# Patient Record
Sex: FEMALE | Race: BLACK OR AFRICAN AMERICAN | Employment: FULL TIME | URBAN - METROPOLITAN AREA
[De-identification: names, ages, dates, MRNs, and addresses within clinical notes are randomized per-mention and may not be internally consistent; named-entity substitution may affect disease eponyms.]

---

## 2018-03-22 ENCOUNTER — OFFICE VISIT (OUTPATIENT)
Dept: NEUROLOGY | Age: 48
End: 2018-03-22

## 2018-03-22 VITALS
RESPIRATION RATE: 18 BRPM | SYSTOLIC BLOOD PRESSURE: 122 MMHG | DIASTOLIC BLOOD PRESSURE: 80 MMHG | WEIGHT: 164.6 LBS | HEIGHT: 65 IN | HEART RATE: 78 BPM | TEMPERATURE: 97.6 F | OXYGEN SATURATION: 99 % | BODY MASS INDEX: 27.42 KG/M2

## 2018-03-22 DIAGNOSIS — G43.909 MIGRAINE WITHOUT STATUS MIGRAINOSUS, NOT INTRACTABLE, UNSPECIFIED MIGRAINE TYPE: Primary | ICD-10-CM

## 2018-03-22 RX ORDER — PROMETHAZINE HYDROCHLORIDE 25 MG/1
25 TABLET ORAL
Qty: 30 TAB | Refills: 0 | Status: SHIPPED | OUTPATIENT
Start: 2018-03-22 | End: 2018-11-02 | Stop reason: CLARIF

## 2018-03-22 RX ORDER — ERGOCALCIFEROL 1.25 MG/1
CAPSULE ORAL
Refills: 0 | COMMUNITY
Start: 2018-03-14 | End: 2018-09-26 | Stop reason: ALTCHOICE

## 2018-03-22 RX ORDER — SUMATRIPTAN 100 MG/1
TABLET, FILM COATED ORAL
Qty: 20 TAB | Refills: 2 | Status: SHIPPED | OUTPATIENT
Start: 2018-03-22

## 2018-03-22 RX ORDER — SUMATRIPTAN 50 MG/1
TABLET, FILM COATED ORAL AS NEEDED
Refills: 3 | COMMUNITY
Start: 2018-03-13 | End: 2018-03-22 | Stop reason: SDUPTHER

## 2018-03-22 RX ORDER — CYCLOBENZAPRINE HCL 10 MG
TABLET ORAL
Refills: 3 | COMMUNITY
Start: 2018-03-13 | End: 2019-10-25 | Stop reason: ALTCHOICE

## 2018-03-22 NOTE — PROGRESS NOTES
NEUROLOGY NEW PATIENT CONSULTATION    REFERRED BY:  Pascual Hernandez MD    CHIEF COMPLAINT: migraines       HISTORY OF PRESENT ILLNESS    HISTORY PROVIDED BY:  Patient    Karrie Habermann is a 57 y.o. female who I am asked to see in consultation for today for migraines She was diagnosed with migraines at the age of 27. The patient started treatment at the age of 27. 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 During this time of her life, she was under stress 2 a week. Did have some improvement. Stress  Trigger. Past few years her headaches have improved. She notes that she is having about 2 migraines a month. She notes that she is going to the  [facility] every 3 months. She will lose vision in her  Eyes like a window blind is closing. Starting menstrual cycle and weather. . In the last month, the patient was a headache free for 28 days. The patient is sensitive to light, sound. Patient experiences nausea and migraines worsen with movement. Triggering factors are anxiety/worry, change in the weather, food and menstrual cycle. The patient describes the pain as throbbing, stabbing, sharp. The pain is located on the top of the head on the left and right sides. The patient stated her mother and maternal aunt had headaches in the past. Patient denies any history of head and neck injury. The patient stated darkness, quiet, rest makes the headaches better. The patient is currently taking  Excedrin a migraine, Fioricet, and Phenergan as needed.     Headache Characterization: throbbing and stabbing  Pain Level:10 /10  Aura:  Yes ,   Frequency/Length: 2 a month last for 2-8hours    Location: top right or left side of the head.    Nausea/Vomiting: yes  Photophobia: yes  Phonophobia: yes  Provoking factors:  Stress, food,   Relieving factors:  Medications, dark room sleep    Focal neurologic symptoms with headache: numbness and tingling in the vida     Meds:  Prior abortive tx:naproxen  Prior preventative tx:amytripline (3 years)     Current abortive tx:phergran and Ferciot, Excedrin migraines   Current preventative tx:none    Family Hx of headaches/migraines: Mother and aunt     Social:  Work:yes,  Housekeeping  Caffeine:yes 1 cup a day  29oz  Tobacco use: no  Sleep habits:6-8 hours a sleep   Exercise:yes         PMH  Past Medical History:   Diagnosis Date    Frequent headaches     Migraine     22 years       SH  Social History     Social History    Marital status: SINGLE     Spouse name: N/A    Number of children: N/A    Years of education: N/A     Social History Main Topics    Smoking status: Never Smoker    Smokeless tobacco: Never Used    Alcohol use Yes      Comment: social    Drug use: No    Sexual activity: Not Asked     Other Topics Concern    None     Social History Narrative       FH  Family History   Problem Relation Age of Onset    Headache Mother     Headache Maternal Aunt        ALLERGIES  No Known Allergies    CURRENT MEDS  Current Outpatient Prescriptions   Medication Sig Dispense Refill    cyclobenzaprine (FLEXERIL) 10 mg tablet TK 1 T PO TID PRN  3    ergocalciferol (ERGOCALCIFEROL) 50,000 unit capsule TK ONE C PO  ONCE WEEKLY FOR 4 WEEKS THEN ONCE MONTHLY FOR 5 MONTHS  0    FIRST-TESTOSTERONE 2 % oint daily.  PROMETHAZINE HCL (PHENERGAN PO) Take  by mouth as needed.  BUTALB/ACETAMINOPHEN/CAFFEINE (FIORICET PO) Take  by mouth as needed.  SUMAtriptan succinate (ZEMBRACE SYMTOUCH) 3 mg/0.5 mL pnij 1 Syringe by SubCUTAneous route as needed. 1 at HA onset and repeat q 1 hour until WHELAN relieved or until used 4 in 24 hours 8 Syringe 3    SUMAtriptan (IMITREX) 100 mg tablet Take 1tab at the onset of HA Repeat in 2 hours. MAX 2 tabs  in 24hrs 20 Tab 2    promethazine (PHENERGAN) 25 mg tablet Take 1 Tab by mouth every six (6) hours as needed for Nausea. 30 Tab 0    SUMAtriptan succinate (ZEMBRACE SYMTOUCH) 3 mg/0.5 mL pnij 3 mg by SubCUTAneous route as needed.  2 Device 0 REVIEW OF SYSTEMS:     Y  N       Y  N  Y  N   Y  N  [] [x] AIDS          [] [x] Falls  [] [x] Memory Loss  [] [x]  Shortness of breath  [] [x] Anxiety          [] [x] Fatigue [] [x] Muscle Pain        [] [x]  Skipped beats  [] [x] Chest Pain   [x] [] Frequent HA [] [x] Ms Weakness     [] [x]  Snoring  [] [x] Constipation [] [x]Hearing loss [] [x] Nause/Vomiting  [] [x]  Stomach Pain  [] [x] Cough          [] [x]Hepatitis [] [x] Neuropathy         [] [x]  Swallowing difficulty  [] [x] Depression  [] [x]Incontinence [] [x] Poor appetite      [] [x]  Vertigo  [] [x] Diarrhea       [] [x] Joint Pain [] [x] Rash                   [] [x]  Visual disturbances  [] [x] Fainting        [] [x] Leg Swelling [] [x] Ringing ears       [] [x]  Weight changes      []Unable to obtain  ROS due to  []mental status change  []sedated   []intubated          PREVIOUS WORKUP  IMAGING:  (I personally reviewed these images in PACS and this is my impression)    LABS  Results for orders placed or performed in visit on 10/17/16   AMB POC URINALYSIS DIP STICK MANUAL W/O MICRO   Result Value Ref Range    Color (UA POC) Yellow     Clarity (UA POC) Clear     Glucose (UA POC) Negative Negative    Bilirubin (UA POC) Negative Negative    Ketones (UA POC) Negative Negative    Specific gravity (UA POC) 1.010 1.001 - 1.035    Blood (UA POC) 4+ Negative    pH (UA POC) 7 4.6 - 8.0    Protein (UA POC) Negative Negative mg/dL    Urobilinogen (UA POC) 0.2 mg/dL 0.2 - 1    Nitrites (UA POC) Negative Negative    Leukocyte esterase (UA POC) Negative Negative       PHYSICAL EXAM  Visit Vitals    /80 (BP 1 Location: Right arm, BP Patient Position: Sitting)    Pulse 78    Temp 97.6 °F (36.4 °C) (Oral)    Resp 18    Ht 5' 5\" (1.651 m)    Wt 164 lb 9.6 oz (74.7 kg)    SpO2 99%    BMI 27.39 kg/m2     General:  Alert, cooperative, no distress. Head:  Normocephalic, without obvious abnormality, atraumatic. Eyes:  Conjunctivae/corneas clear. Pupils equal, round, reactive to light. Extraocular movements intact, VFF, NO papilledema   Lungs:  Heart:   Non labored breathing  Regular rate and rhythm, no carotid bruits   Abdomen:   Soft, non-distended   Extremities: Extremities normal, atraumatic, no cyanosis or edema. Pulses: 2+ and symmetric all extremities. Skin: Skin color, texture, turgor normal. No rashes or lesions. Neurologic:  Gen: Attention normal             Language: naming, repetition, fluency normal             Memory: intact recent and remote memory  Cranial Nerves:  I: smell Not tested   II: visual fields Full to confrontation   II: pupils Equal, round, reactive to light   II: optic disc No papilledema   III,VII: ptosis none   III,IV,VI: extraocular muscles  Full ROM   V: mastication normal   V: facial light touch sensation  normal   VII: facial muscle function   symmetric   VIII: hearing symmetric   IX: soft palate elevation  normal   XI: trapezius strength  5/5   XI: sternocleidomastoid strength 5/5   XI: neck flexion strength  5/5   XII: tongue  midline     Motor: normal bulk and tone, no tremor              Strength: 5/5 all four extremities  Sensory: intact to LT, PP, vibration, and temperature  Coordination: FTN intact  Gait: normal gait including tandem   Reflexes: 2+ throughout       [address] Horta is a 57 y.o. female who presents for evaluation of migraines. She is only having about 2 migraines a month so there is no need for a preventive however she does need something effective to abort her headache. I will Increase her Imitrex 100 mg. Drugs of the 'triptan' class are very effective for migraine treatment but can have significant side effects which are explained carefully to her. I have reviewed the contraindication with ischemic heart disease with the patient, and the patient and I agree that her risk of ischemic disease is very low and it is appropriate to use a triptan medication.  If such risk factors change in the future she will stop using the drug and let me know. Side effects such as transient chest or neck pain may occur, nausea, diarrhea, tingling, flushing are common. Re-dosing, if required, should be done once after a 2-hour interval. She has severe nausea during her migraine I will prescribe zembrace to have as a rescue medication to abort her headaches when she is unable to take her pills. We will provide her with sample of  Zembrace. Using a 3 mg injection vs 6mg injection have shown to have less side effects. ICD-10-CM ICD-9-CM    1.  Migraine without status migrainosus, not intractable, unspecified migraine type G43.909 346.90 SUMAtriptan succinate (ZEMBRACE SYMTOUCH) 3 mg/0.5 mL pnij       FU 6-8 weeks  Judson Puckett NP    CC: Thu Sanchez MD  Fax: [phone]

## 2018-03-22 NOTE — PROGRESS NOTES
Niraj Nolen is a 57 y.o. female presents today as a new patient diagnosed with migraines at the age of 27. Patient started treatment at the age of 27. In the last month the patient was headache free for 28 days. She has headaches lasting 2 days more than 4 hours a day. Patient is sensitive to light, sound. Patient experiences nausea and migraines worsen with movement. Triggering factors are anxiety/worry, change in the weather, food and menstrual cycle. Patient describes the pain as throbbing, stabbing, sharp,. The pain is located on the top of the head on the left and right sides. Patient stated her mother and maternal aunt had headaches in the past. Patient denies history of head and neck injury. Patient stated darkness, quiet, rest makes the headaches better. Patient is currently taking  Excedrin migraine, Fioricet and phenergan as needed.

## 2018-03-22 NOTE — MR AVS SNAPSHOT
[address] [address] [address] [place] [address] 
[phone] Patient: Christina Cooper MRN: [id number] ZQT:[birth year] Visit Information Date & Time Provider Department Dept. Phone Encounter #  
 3/22/2018  9:00 AM JOEY Foster [facility] [phone] [id number] Follow-up Instructions Return in about 2 months (around 5/22/2018). Upcoming Health Maintenance Date Due DTaP/Tdap/Td series (1 - Tdap) 2/2/1993 PAP AKA CERVICAL CYTOLOGY 2/2/1993 Influenza Age 5 to Adult 8/1/2017 Allergies as of 3/22/2018  Review Complete On: 3/22/2018 By: Fariba Patrick LPN No Known Allergies Current Immunizations  Never Reviewed No immunizations on file. Not reviewed this visit Vitals BP Pulse Temp Resp Height(growth percentile) Weight(growth percentile) 122/80 (BP 1 Location: Right arm, BP Patient Position: Sitting) 78 97.6 °F (36.4 °C) (Oral) 18 5' 5\" (1.651 m) 164 lb 9.6 oz (74.7 kg) SpO2 BMI OB Status Smoking Status 99% 27.39 kg/m2 Having regular periods Never Smoker Vitals History BMI and BSA Data Body Mass Index Body Surface Area  
 27.39 kg/m 2 1.85 m 2 Preferred Pharmacy Pharmacy Name Phone [facility] [address] [place] [address] Drive [phone] Your Updated Medication List  
  
   
This list is accurate as of 3/22/18 10:11 AM.  Always use your most recent med list.  
  
  
  
  
 cyclobenzaprine 10 mg tablet Commonly known as:  FLEXERIL TK 1 T PO TID PRN  
  
 ergocalciferol 50,000 unit capsule Commonly known as:  ERGOCALCIFEROL TK ONE C PO  ONCE WEEKLY FOR 4 WEEKS THEN ONCE MONTHLY FOR 5 MONTHS  
  
 FIORICET PO Take  by mouth as needed. FIRST-TESTOSTERONE 2 % Oint Generic drug:  testosterone propionate  
daily.   
  
 * PHENERGAN PO  
 Take  by mouth as needed. * promethazine 25 mg tablet Commonly known as:  PHENERGAN Take 1 Tab by mouth every six (6) hours as needed for Nausea. * SUMAtriptan succinate 3 mg/0.5 mL Pnij Commonly known as:  Faylene Norberto  
1 Syringe by SubCUTAneous route as needed. 1 at HA onset and repeat q 1 hour until WHELAN relieved or until used 4 in 24 hours * SUMAtriptan 100 mg tablet Commonly known as:  IMITREX Take 1tab at the onset of HA Repeat in 2 hours. MAX 2 tabs  in 24hrs * Notice: This list has 4 medication(s) that are the same as other medications prescribed for you. Read the directions carefully, and ask your doctor or other care provider to review them with you. Prescriptions Sent to Pharmacy Refills SUMAtriptan succinate (ZEMBRACE SYMTOUCH) 3 mg/0.5 mL pnij 3 Si Syringe by SubCUTAneous route as needed. 1 at HA onset and repeat q 1 hour until WHELAN relieved or until used 4 in 24 hours Class: Normal  
 Pharmacy: [facility] [address] [place] [address] Ph #: [phone] Route: SubCUTAneous SUMAtriptan (IMITREX) 100 mg tablet 2 Sig: Take 1tab at the onset of HA Repeat in 2 hours. MAX 2 tabs  in 24hrs Class: Normal  
 Pharmacy: [facility] [address] [place] [address] Ph #: [phone]  
 promethazine (PHENERGAN) 25 mg tablet 0 Sig: Take 1 Tab by mouth every six (6) hours as needed for Nausea. Class: Normal  
 Pharmacy: [facility] [address] [place] [address] Ph #: [phone] Route: Oral  
  
Follow-up Instructions Return in about 2 months (around 2018). Introducing [facility] SERVICES!    
 Debbora Form introduces [employer] patient portal. Now you can access parts of your medical record, email your doctor's office, and request medication refills online. 1. In your internet browser, go to [email or web address] 2. Click on the First Time User? Click Here link in the Sign In box. You will see the New Member Sign Up page. 3. Enter your [employer] Access Code exactly as it appears below. You will not need to use this code after youve completed the sign-up process. If you do not sign up before the expiration date, you must request a new code. · [employer] Access Code: [email or web address] Expires: 6/20/2018  9:19 AM 
 
4. Enter the last four digits of your Social Security Number (xxxx) and Date of Birth (mm/dd/yyyy) as indicated and click Submit. You will be taken to the next sign-up page. 5. Create a [employer] ID. This will be your [employer] login ID and cannot be changed, so think of one that is secure and easy to remember. 6. Create a [employer] password. You can change your password at any time. 7. Enter your Password Reset Question and Answer. This can be used at a later time if you forget your password. 8. Enter your e-mail address. You will receive e-mail notification when new information is available in [address] 9. Click Sign Up. You can now view and download portions of your medical record. 10. Click the Download Summary menu link to download a portable copy of your medical information. If you have questions, please visit the Frequently Asked Questions section of the [employer] website. Remember, [employer] is NOT to be used for urgent needs. For medical emergencies, dial 911. Now available from your iPhone and Android! Please provide this summary of care documentation to your next provider. Your primary care clinician is listed as Satnam . If you have any questions after today's visit, please call [phone].

## 2018-06-07 ENCOUNTER — OFFICE VISIT (OUTPATIENT)
Dept: NEUROLOGY | Age: 48
End: 2018-06-07

## 2018-06-07 ENCOUNTER — TELEPHONE (OUTPATIENT)
Dept: NEUROLOGY | Age: 48
End: 2018-06-07

## 2018-06-07 VITALS
TEMPERATURE: 98.1 F | SYSTOLIC BLOOD PRESSURE: 127 MMHG | WEIGHT: 163 LBS | HEIGHT: 65 IN | OXYGEN SATURATION: 99 % | BODY MASS INDEX: 27.16 KG/M2 | HEART RATE: 92 BPM | DIASTOLIC BLOOD PRESSURE: 96 MMHG | RESPIRATION RATE: 16 BRPM

## 2018-06-07 DIAGNOSIS — G43.909 MIGRAINE WITHOUT STATUS MIGRAINOSUS, NOT INTRACTABLE, UNSPECIFIED MIGRAINE TYPE: Primary | ICD-10-CM

## 2018-06-07 NOTE — PROGRESS NOTES
Date:  18     Name:  Paul Mercado  :  1970  MRN:  [id number]     PCP:  Bubba Thornton MD    Chief Complaint   Patient presents with    Migraine       HISTORY OF PRESENT ILLNESS:Follow up visit for Migraines. The patient report that the [place] has been working well. She states that it will get rid of her headaches in about 15 mins. She did note it make her feel drowsy afterward, but she is able to go back to what she was doing prior to her migraines. She continues to have about 2 migraines a month. Lasting  with medication less than 15 mins . She will treat her migraines when her vision starts to become blurry and she starts to have an aura. At this time she is happy with her treatment. Except as noted above, denies  fever, chills, cough. No CP or SOB. No dysuria, loss of bowel or bladder control. No Weight loss. Appetite good. Sleeping well. No sweats. No edema. No bruising or bleeding. No nausea or vomit. No diarrhea. No frequency, urgency, No depressive sxs. No anxiety. Denies sore throat, nasal congestion, nasal discharge, epistaxis, tinnitus, hearing loss, back pain, muscle pain, or joint pain. Current Outpatient Prescriptions   Medication Sig    [START ON 10/7/2018] SUMAtriptan succinate (ZEMBRACE SYMTOUCH) 3 mg/0.5 mL pnij 1 Syringe by SubCUTAneous route as needed. 1 at HA onset and repeat q 1 hour until WHELAN relieved or until used 4 in 24 hours    cyclobenzaprine (FLEXERIL) 10 mg tablet TK 1 T PO TID PRN    ergocalciferol (ERGOCALCIFEROL) 50,000 unit capsule TK ONE C PO  ONCE WEEKLY FOR 4 WEEKS THEN ONCE MONTHLY FOR 5 MONTHS    FIRST-TESTOSTERONE 2 % oint daily.  BUTALB/ACETAMINOPHEN/CAFFEINE (FIORICET PO) Take  by mouth as needed.  SUMAtriptan (IMITREX) 100 mg tablet Take 1tab at the onset of HA Repeat in 2 hours. MAX 2 tabs  in 24hrs    promethazine (PHENERGAN) 25 mg tablet Take 1 Tab by mouth every six (6) hours as needed for Nausea.      No current facility-administered medications for this visit. No Known Allergies  Past Medical History:   Diagnosis Date    Frequent headaches     Migraine     24 years     Past Surgical History:   Procedure Laterality Date    HX DILATION AND CURETTAGE      2010     Social History     Social History    Marital status: SINGLE     Spouse name: N/A    Number of children: N/A    Years of education: N/A     Occupational History    Not on file. Social History Main Topics    Smoking status: Never Smoker    Smokeless tobacco: Never Used    Alcohol use Yes      Comment: social    Drug use: No    Sexual activity: Not on file     Other Topics Concern    Not on file     Social History Narrative     Family History   Problem Relation Age of Onset    Headache Mother     Headache Maternal Aunt        PHYSICAL EXAMINATION:    Visit Vitals    BP (!) 127/96 (BP 1 Location: Left arm, BP Patient Position: Sitting)    Pulse 92    Temp 98.1 °F (36.7 °C) (Temporal)    Resp 16    Ht 5' 5\" (1.651 m)    Wt 163 lb (73.9 kg)    SpO2 99%    BMI 27.12 kg/m2   General: Well defined, nourished, and groomed individual in no acute distress. Neck: Supple, nontender, no bruits, no pain with resistance to active range of motion. Heart: Regular rate and rhythm, no murmurs, rub, or gallop. Normal S1S2. Lungs: Clear to auscultation bilaterally with equal chest expansion, no cough, no wheeze  Musculoskeletal: Extremities revealed no edema and had full range of motion of joints. Psych: Good mood and bright affect      NEUROLOGICAL EXAMINATION:   Mental Status: Alert and oriented to person, place, and time       Cranial Nerves:   II, III, IV, VI: Visual acuity grossly intact. Visual fields are normal.   Pupils are equal, round, and reactive to light and accommodation. Extra-ocular movements are full and fluid. Fundoscopic exam was benign, no ptosis or nystagmus. V-XII: Hearing is grossly intact.  Facial features are symmetric, with normal sensation and strength. The palate rises symmetrically and the tongue protrudes midline. Sternocleidomastoids 5/5.       Motor Examination: Normal tone, bulk, and strength, 5/5 muscle strength throughout.       Coordination: No resting or intention tremor      Gait and Station: Steady while walking. Normal arm swing. No pronator drift. No muscle wasting or fasiculations noted.       Reflexes: DTRs 2+ throughout. ASSESSMENT AND PLAN    ICD-10-CM ICD-9-CM    1. Migraine without status migrainosus, not intractable, unspecified migraine type G43.909 346.90      Impression/Plan   Encounter Diagnosis   Name Primary?  Migraine without status migrainosus, not intractable, unspecified migraine type Yes    doing well. Continue with current regiem. Zembrace are aborting her migraines effectively. Follow-up 3 months.      Siomara Herrera NP

## 2018-06-07 NOTE — PROGRESS NOTES
Chief Complaint   Patient presents with    Migraine     1. Have you been to the ER, urgent care clinic since your last visit? Hospitalized since your last visit? No    2. Have you seen or consulted any other health care providers outside of the [address] [place] [address] since your last visit? Include any pap smears or colon screening. No      Patient stated the Imitrex and Zembrace injections are working.

## 2018-06-07 NOTE — MR AVS SNAPSHOT
[address] [address] [address] [place] [address] 
[phone] Patient: Marilee Mauricio MRN: [id number] OER:0/9/3768 Visit Information Date & Time Provider Department Dept. Phone Encounter #  
 6/7/2018  9:30 AM JOEY Villanueva [facility] at [address] [place] [address] [id number] Follow-up Instructions Return in about 3 months (around 9/7/2018). Upcoming Health Maintenance Date Due DTaP/Tdap/Td series (1 - Tdap) 2/2/1993 PAP AKA CERVICAL CYTOLOGY 2/2/1993 Influenza Age 5 to Adult 8/1/2018 Allergies as of 6/7/2018  Review Complete On: 6/7/2018 By: Avril Bangura NP No Known Allergies Current Immunizations  Never Reviewed No immunizations on file. Not reviewed this visit You Were Diagnosed With   
  
 Codes Comments Migraine without status migrainosus, not intractable, unspecified migraine type    -  Primary ICD-10-CM: C11.186 ICD-9-CM: 346.90 Vitals BP Pulse Temp Resp Height(growth percentile) (!) 127/96 (BP 1 Location: Left arm, BP Patient Position: Sitting) 92 98.1 °F (36.7 °C) (Temporal) 16 5' 5\" (1.651 m) Weight(growth percentile) SpO2 BMI OB Status Smoking Status 163 lb (73.9 kg) 99% 27.12 kg/m2 Having regular periods Never Smoker BMI and BSA Data Body Mass Index Body Surface Area  
 27.12 kg/m 2 1.84 m 2 Preferred Pharmacy Pharmacy Name Phone [facility] [address] [phone] Your Updated Medication List  
  
   
This list is accurate as of 6/7/18  9:57 AM.  Always use your most recent med list.  
  
  
  
  
 cyclobenzaprine 10 mg tablet Commonly known as:  FLEXERIL TK 1 T PO TID PRN  
  
 ergocalciferol 50,000 unit capsule Commonly known as:  ERGOCALCIFEROL TK ONE C PO  ONCE WEEKLY FOR 4 WEEKS THEN ONCE MONTHLY FOR 5 MONTHS FIORICET PO Take  by mouth as needed. FIRST-TESTOSTERONE 2 % Oint Generic drug:  testosterone propionate  
daily. promethazine 25 mg tablet Commonly known as:  PHENERGAN Take 1 Tab by mouth every six (6) hours as needed for Nausea. * SUMAtriptan 100 mg tablet Commonly known as:  IMITREX Take 1tab at the onset of HA Repeat in 2 hours. MAX 2 tabs  in 24hrs * SUMAtriptan succinate 3 mg/0.5 mL Pnij Commonly known as:  Nicola Mail  
1 Syringe by SubCUTAneous route as needed. 1 at HA onset and repeat q 1 hour until WHELAN relieved or until used 4 in 24 hours Start taking on:  10/7/2018 * Notice: This list has 2 medication(s) that are the same as other medications prescribed for you. Read the directions carefully, and ask your doctor or other care provider to review them with you. Prescriptions Sent to Pharmacy Refills SUMAtriptan succinate (ZEMBRACE SYMTOUCH) 3 mg/0.5 mL pnij 3 Starting on: 10/7/2018 Si Syringe by SubCUTAneous route as needed. 1 at HA onset and repeat q 1 hour until WHELAN relieved or until used 4 in 24 hours Class: Normal  
 Pharmacy: [facility] [address] [place] [address] Ph #: [phone] Route: SubCUTAneous Follow-up Instructions Return in about 3 months (around 2018). Introducing [facility]! Ramakrishna Campos introduces [employer] patient portal. Now you can access parts of your medical record, email your doctor's office, and request medication refills online. 1. In your internet browser, go to [email or web address] 2. Click on the First Time User? Click Here link in the Sign In box. You will see the New Member Sign Up page. 3. Enter your Pillars[employer]Life Access Code exactly as it appears below. You will not need to use this code after youve completed the sign-up process.  If you do not sign up before the expiration date, you must request a new code. · [employer] Access Code: [email or web address] Expires: 6/20/2018  9:19 AM 
 
4. Enter the last four digits of your Social Security Number (xxxx) and Date of Birth (mm/dd/yyyy) as indicated and click Submit. You will be taken to the next sign-up page. 5. Create a Boundless Geo ID. This will be your Boundless Geo login ID and cannot be changed, so think of one that is secure and easy to remember. 6. Create a Boundless Geo password. You can change your password at any time. 7. Enter your Password Reset Question and Answer. This can be used at a later time if you forget your password. 8. Enter your e-mail address. You will receive e-mail notification when new information is available in [address] 9. Click Sign Up. You can now view and download portions of your medical record. 10. Click the Download Summary menu link to download a portable copy of your medical information. If you have questions, please visit the Frequently Asked Questions section of the Boundless Geo website. Remember, Boundless Geo is NOT to be used for urgent needs. For medical emergencies, dial 911. Now available from your iPhone and Android! Please provide this summary of care documentation to your next provider. Your primary care clinician is listed as Nikc Pace. If you have any questions after today's visit, please call [phone].

## 2018-06-07 NOTE — TELEPHONE ENCOUNTER
If you can't reach her, you can call her 's phone at [phone].     Said she forgot to ask you something

## 2018-08-10 ENCOUNTER — HOSPITAL ENCOUNTER (OUTPATIENT)
Dept: MAMMOGRAPHY | Age: 48
Discharge: HOME | End: 2018-08-10
Attending: SPECIALIST
Payer: COMMERCIAL

## 2018-08-10 DIAGNOSIS — Z12.31 VISIT FOR SCREENING MAMMOGRAM: ICD-10-CM

## 2018-08-10 PROCEDURE — 77067 SCR MAMMO BI INCL CAD: CPT

## 2018-09-14 ENCOUNTER — HOSPITAL ENCOUNTER (OUTPATIENT)
Dept: ULTRASOUND IMAGING | Age: 48
Discharge: HOME | End: 2018-09-14
Attending: SPECIALIST
Payer: COMMERCIAL

## 2018-09-14 ENCOUNTER — APPOINTMENT (OUTPATIENT)
Dept: ULTRASOUND IMAGING | Age: 48
End: 2018-09-14
Attending: SPECIALIST
Payer: COMMERCIAL

## 2018-09-14 ENCOUNTER — HOSPITAL ENCOUNTER (OUTPATIENT)
Dept: MAMMOGRAPHY | Age: 48
Discharge: HOME | End: 2018-09-14
Attending: SPECIALIST
Payer: COMMERCIAL

## 2018-09-14 DIAGNOSIS — R92.8 ABNORMAL MAMMOGRAM: ICD-10-CM

## 2018-09-14 PROCEDURE — 77065 DX MAMMO INCL CAD UNI: CPT

## 2018-09-14 PROCEDURE — 76642 ULTRASOUND BREAST LIMITED: CPT

## 2018-09-14 NOTE — PROGRESS NOTES
Dr. Jack Comes spoke w/pt concerning recommendation for ultrasound guided breast biopsy - Dr. Mariya Garcia, advised to fax order request, they would fax order back and to schedule biopsy appt w/Ms. Barrington Vital at her preference. If biopsy is positive, schedule surgical consult w/Dr. Genny Laboy along w/faxing pathology report to Dr. Moshe Kauffman.

## 2018-09-17 ENCOUNTER — HOSPITAL ENCOUNTER (OUTPATIENT)
Dept: MAMMOGRAPHY | Age: 48
Discharge: HOME | End: 2018-09-17
Attending: SPECIALIST
Payer: COMMERCIAL

## 2018-09-17 ENCOUNTER — HOSPITAL ENCOUNTER (OUTPATIENT)
Dept: ULTRASOUND IMAGING | Age: 48
Discharge: HOME | End: 2018-09-17
Attending: SPECIALIST
Payer: COMMERCIAL

## 2018-09-17 VITALS — WEIGHT: 160 LBS | HEIGHT: 65 IN | RESPIRATION RATE: 20 BRPM | BODY MASS INDEX: 26.66 KG/M2

## 2018-09-17 DIAGNOSIS — R92.8 ABNORMAL MAMMOGRAM OF LEFT BREAST: ICD-10-CM

## 2018-09-17 DIAGNOSIS — N63.0 LUMP OR MASS IN BREAST: ICD-10-CM

## 2018-09-17 PROCEDURE — A4648 IMPLANTABLE TISSUE MARKER: HCPCS

## 2018-09-17 PROCEDURE — 77065 DX MAMMO INCL CAD UNI: CPT

## 2018-09-17 PROCEDURE — 88360 TUMOR IMMUNOHISTOCHEM/MANUAL: CPT | Performed by: RADIOLOGY

## 2018-09-17 PROCEDURE — 88305 TISSUE EXAM BY PATHOLOGIST: CPT | Performed by: RADIOLOGY

## 2018-09-17 RX ORDER — LIDOCAINE HYDROCHLORIDE 10 MG/ML
10 INJECTION INFILTRATION; PERINEURAL
Status: DISPENSED | OUTPATIENT
Start: 2018-09-17 | End: 2018-09-17

## 2018-09-17 NOTE — ROUTINE PROCESS
Discharge instructions reviewed with understanding, questions reviewed, copy given to patient. Post procedure Mammo completed and reviewed MD, pt cleared for discharge. Biopsy site clean and dry, hemostasis achieved. Steri strips with DSD placed. Ice pack held by patient. Pt verbalized she would like to be notified by phone of any results. Pt encouraged to call department if she has not received her results in 3-5 business days. Pt advised not answer cell phone call while driving. Specimen carried to lab by RN. Ms. Pgae Rios requested to have PCP Dr. Lindy Franco to receive mammo/ultrasound breast imaging along w/breast biopsy results. Ms. Page Rios stated that she needed a work note because she uses a lot of arm movements and lifting in her job and requested a work note. She was going to talk to her supervisor and see if there was light duty for this evening and day after, if not, she was going to take this evening off.

## 2018-09-17 NOTE — IP AVS SNAPSHOT
[address] [facility]. 
[phone] Patient: Brianda Smart MRN: [id number] CER:9/2/[birth year]69 About your hospitalization You were admitted on:  September 17, 2018 You last received care in the:  [facility] Ultrasound You were discharged on:  September 17, 2018 Why you were hospitalized Your primary diagnosis was:  Not on File Follow-up Information None Discharge Orders None A check kiya indicates which time of day the medication should be taken. My Medications ASK your doctor about these medications Instructions Each Dose to Equal  
 Morning Noon Evening Bedtime  
 cyclobenzaprine 10 mg tablet Commonly known as:  FLEXERIL Your last dose was: Your next dose is:    
   
   
 TK 1 T PO TID PRN  
     
   
   
   
  
 ergocalciferol 50,000 unit capsule Commonly known as:  ERGOCALCIFEROL Your last dose was: Your next dose is:    
   
   
 TK ONE C PO  ONCE WEEKLY FOR 4 WEEKS THEN ONCE MONTHLY FOR 5 MONTHS  
     
   
   
   
  
 FIORICET PO Your last dose was: Your next dose is: Take  by mouth as needed. FIRST-TESTOSTERONE 2 % Oint Generic drug:  testosterone propionate Your last dose was: Your next dose is:    
   
   
 daily. promethazine 25 mg tablet Commonly known as:  PHENERGAN Your last dose was: Your next dose is: Take 1 Tab by mouth every six (6) hours as needed for Nausea. 25 mg  
    
   
   
   
  
 * SUMAtriptan 100 mg tablet Commonly known as:  IMITREX Your last dose was: Your next dose is: Take 1tab at the onset of HA Repeat in 2 hours. MAX 2 tabs  in 24hrs * SUMAtriptan succinate 3 mg/0.5 mL Taz Commonly known as:  Nicola Rivero  
   
 Your last dose was: Your next dose is:    
   
   
 3 mg by SubCUTAneous route as needed. 3 mg * SUMAtriptan succinate 3 mg/0.5 mL Judiij Commonly known as:  Tim Boston Start taking on:  10/7/2018 Your last dose was: Your next dose is:    
   
   
 1 Syringe by SubCUTAneous route as needed. 1 at HA onset and repeat q 1 hour until WHELAN relieved or until used 4 in 24 hours 1 Syringe * Notice: This list has 3 medication(s) that are the same as other medications prescribed for you. Read the directions carefully, and ask your doctor or other care provider to review them with you. Discharge Instructions [facility] Radiology Department [phone] Radiologist:    Dr. Colin Lopez Date:       9/17/2018 Work Excuse/ Work Restriction / Return to Work Shelia Shipley was seen in the Radiology Department today. Please excuse Ms. Ken Barroso as the physician has recommended additional rest for recovery. She may return to work on within the next 48 hours (9/18 - 9/19) without restrictions. ______________________________________________ Nurse signature or MD Signature Instructions Following Your Breast Biopsy Pain Control · Tylenol should be taken as directed on the back of the bottle (aguila 4-6 hours) for the next 24 hours post breast biopsy unless directed otherwise by a physician. · No Aspirin or Anti-Inflammatory  (Motrin, Advil ) medications for 24 hours. · Wearing a soft, comfortable (Wire free ) bra, even at night, for 24 hours is helpful. · Use a clean, covered ice pack over the site every 1- 2 hours til bedtime, for 20-30 minutes at a time for the first 24 hours. Biopsy Site · A small amount of bleeding and /or oozing from the Biopsy site is normal, as well as bloody nipple discharge, which is rare. · You may notice bruising on the skin over the next few days. · Steri Strips and a dressing have been applied. · The steri strips can remain on for up to 5 days. · The clean dressing can be removed in 48 hours, however, if the dressing becomes loose, causes redness or irritation of the skin or any drainage has collected, please remove it an replace it with a Band-Aid. · Avoid getting the Biopsy site wet for 48 hours, avoid showering, swimming and hot tubs. · Should you have excessive bleeding or pain, please seek medical treatment or call [employer]. [phone], 7:30 - 4:00 p.m. After hours (ask to speak to the on-call Radiology Nurse-RN)                        [phone] or [phone] Activity · Rest the day of the biopsy, avoiding strenuous activities and any heavy lifting. · You may resume most activities/ work the following day. Pathology Report · The results of your Pathology report, from the laboratory, should be available in 3-5 business days. The Radiologist will review your results and, based on your preference, we will be happy to provide results to you by phone or in person. · You will also be advised, at that time, of any further appointments or follow- up you may need. Introducing \[facility]\"" & HEALTH SERVICES! [facility] introduces [employer] patient portal. Now you can access parts of your medical record, email your doctor's office, and request medication refills online. 1. In your internet browser, go to [email or web address] 2. Click on the First Time User? Click Here link in the Sign In box. You will see the New Member Sign Up page. 3. Enter your CloudLink Tech Access Code exactly as it appears below. You will not need to use this code after youve completed the sign-up process.  If you do not sign up before the expiration date, you must request a new code. · [employer] Access Code: [email or web address] Expires: 10/24/2018  1:24 PM 
 
4. Enter the last four digits of your Social Security Number (xxxx) and Date of Birth (mm/dd/yyyy) as indicated and click Submit. You will be taken to the next sign-up page. 5. Create a [employer] ID. This will be your [employer] login ID and cannot be changed, so think of one that is secure and easy to remember. 6. Create a [employer] password. You can change your password at any time. 7. Enter your Password Reset Question and Answer. This can be used at a later time if you forget your password. 8. Enter your e-mail address. You will receive e-mail notification when new information is available in [address] 9. Click Sign Up. You can now view and download portions of your medical record. 10. Click the Download Summary menu link to download a portable copy of your medical information. If you have questions, please visit the Frequently Asked Questions section of the [employer] website. Remember, [employer] is NOT to be used for urgent needs. For medical emergencies, dial 911. Now available from your iPhone and Android! Introducing Oren Heller As a [facility] patient, I wanted to make you aware of our electronic visit tool called Oren Heller. [facility] 24/7 allows you to connect within minutes with a medical provider 24 hours a day, seven days a week via a mobile device or tablet or logging into a secure website from your computer. You can access Oren Heller from anywhere in the [place].  
 
A virtual visit might be right for you when you have a simple condition and feel like you just dont want to get out of bed, or cant get away from work for an appointment, when your regular [facility] provider is not available (evenings, weekends or holidays), or when youre out of town and need minor care. Electronic visits cost only $49 and if the [employer] 24/7 provider determines a prescription is needed to treat your condition, one can be electronically transmitted to a nearby pharmacy*. Please take a moment to enroll today if you have not already done so. The enrollment process is free and takes just a few minutes. To enroll, please download the [employer] 24/7 bety to your tablet or phone, or visit [email or web address] to enroll on your computer. And, as an [address] [place] [address] patient with a [employer] account, the results of your visits will be scanned into your electronic medical record and your primary care provider will be able to view the scanned results. We urge you to continue to see your regular [facility] provider for your ongoing medical care. And while your primary care provider may not be the one available when you seek a [email or web address] virtual visit, the peace of mind you get from getting a real diagnosis real time can be priceless. For more information on [email or web address], view our Frequently Asked Questions (FAQs) at [email or web address]. Sincerely, 
 
Debbie Sparks MD 
Chief Medical Officer Ap[address]8 Mariya Aranda *:  certain medications cannot be prescribed via [email or web address] Unresulted Labs-Please follow up with your PCP about these lab tests Order Current Status US BX BREAST VAC LT 1ST LESION W/CLIP AND SPECIMEN In process Providers Seen During Your Hospitalization Provider Specialty Primary office phone Haley Nix MD Obstetrics & Gynecology [phone] Your Primary Care Physician (PCP) Primary Care Physician Office Phone Office Fax Charlene Granda [phone] You are allergic to the following No active allergies Recent Documentation Height Weight Breastfeeding? BMI OB Status Smoking Status 1.651 m 72.6 kg No 26.63 kg/m2 Having regular periods Never Smoker Emergency Contacts Name Discharge Info Relation Home Work Mobile Garcia,Dominguez DISCHARGE CAREGIVER [3] Spouse [3] [phone] Patient Belongings The following personal items are in your possession at time of discharge: 
     Visual Aid: None Please provide this summary of care documentation to your next provider. Signatures-by signing, you are acknowledging that this After Visit Summary has been reviewed with you and you have received a copy. Patient Signature:  ____________________________________________________________ Date:  ____________________________________________________________  
  
[facility] Provider Signature:  ____________________________________________________________ Date:  ____________________________________________________________

## 2018-09-17 NOTE — ROUTINE PROCESS
Dr. Richardson Sheldahl Present for pre procedure consult and consent - questions reviewed with understanding - consent signed. Discharge instructions given and reviewed w/pt verbalizing understanding - will review again at discharge.

## 2018-09-17 NOTE — DISCHARGE INSTRUCTIONS
[facility]  Radiology Department  [phone]      Radiologist:    Dr. Og Wen    Date:       9/17/2018      Work Excuse/ Work Restriction / Return to Work        Yrn Duval was seen in the Radiology Department today. Please excuse Ms. Tova Garcia as the physician has recommended additional rest for recovery. She may return to work on within the next 48 hours (9/18 - 9/19) without restrictions. ______________________________________________    Nurse signature or MD Signature                                                                      Instructions Following Your Breast Biopsy         Pain Control    · Tylenol should be taken as directed on the back of the bottle (aguila 4-6 hours) for the next 24 hours post breast biopsy unless directed otherwise by a physician. · No Aspirin or Anti-Inflammatory  (Motrin, Advil ) medications for 24 hours. · Wearing a soft, comfortable (Wire free ) bra, even at night, for 24 hours is helpful. · Use a clean, covered ice pack over the site every 1- 2 hours til bedtime, for 20-30 minutes at a time for the first 24 hours. Biopsy Site     · A small amount of bleeding and /or oozing from the Biopsy site is normal, as well as bloody nipple discharge, which is rare. · You may notice bruising on the skin over the next few days. · Steri Strips and a dressing have been applied. · The steri strips can remain on for up to 5 days. · The clean dressing can be removed in 48 hours, however, if the dressing becomes loose, causes redness or irritation of the skin or any drainage has collected, please remove it an replace it with a Band-Aid. · Avoid getting the Biopsy site wet for 48 hours, avoid showering, swimming and hot tubs. · Should you have excessive bleeding or pain, please seek medical treatment or call                 [employer] [phone], 7:30 - 4:00 p.m.                After hours (ask to speak to the on-call Radiology Nurse-RN)                        [phone] or [phone]      Activity      · Rest the day of the biopsy, avoiding strenuous activities and any heavy lifting. · You may resume most activities/ work the following day. Pathology Report     · The results of your Pathology report, from the laboratory, should be available in 3-5 business days. The Radiologist will review your results and, based on your preference, we will be happy to provide results to you by phone or in person. · You will also be advised, at that time, of any further appointments or follow- up you may need.

## 2018-09-19 DIAGNOSIS — C50.912 MALIGNANT NEOPLASM OF LEFT FEMALE BREAST, UNSPECIFIED ESTROGEN RECEPTOR STATUS, UNSPECIFIED SITE OF BREAST (HCC): Primary | ICD-10-CM

## 2018-09-19 NOTE — PROGRESS NOTES
Patient given biopsy results by Dr. Jose Alejandro Casanova today. Order noted in connect care from Dr. Antelmo Nath for Bilateral Breast MRI to be scheduled as soon as possible. 12:50pm- Spoke with patient about her upcoming surgical consultation with Dr. An Yi on Wednesday, 9/26/2018 @ 8:30 am @ [facility] [address]. Patient is to arrive by 8:00 am for new patient paperwork. Also arranged Bilateral Breast MRI on Thursday, 9/20/18 @ 1:45pm. Patient is to arrive by 12:45 pm to register. Patient understands that we must receive authorization (stat authorization placed per Anetra in scheduling) prior to tomorrow's MRI. Patient aware that the [facility] nurse/MRI tech will call if we have not received authorization by 11:30am. Patient lives close by and is agreeable. Patient understands that if we don't receive insurance authorization, she will need to be rescheduled.

## 2018-09-19 NOTE — PROGRESS NOTES
Placed order for breast mri, per request from Molly Castillo, for newly diagnosed breast cancer. Patient has a breast talk appointment with Dr. Teena Gonzalez next week.

## 2018-09-20 ENCOUNTER — HOSPITAL ENCOUNTER (OUTPATIENT)
Dept: MRI IMAGING | Age: 48
Discharge: HOME | End: 2018-09-20
Attending: SURGERY
Payer: COMMERCIAL

## 2018-09-20 DIAGNOSIS — C50.912 MALIGNANT NEOPLASM OF LEFT FEMALE BREAST, UNSPECIFIED ESTROGEN RECEPTOR STATUS, UNSPECIFIED SITE OF BREAST (HCC): ICD-10-CM

## 2018-09-20 PROCEDURE — A9585 GADOBUTROL INJECTION: HCPCS | Performed by: SURGERY

## 2018-09-20 PROCEDURE — 77059 MRI BREAST BI W WO CONT: CPT

## 2018-09-20 PROCEDURE — 74011250636 HC RX REV CODE- 250/636: Performed by: SURGERY

## 2018-09-20 RX ADMIN — GADOBUTROL 7 ML: 604.72 INJECTION INTRAVENOUS at 09:36

## 2018-09-26 ENCOUNTER — OFFICE VISIT (OUTPATIENT)
Dept: SURGERY | Age: 48
End: 2018-09-26

## 2018-09-26 VITALS
BODY MASS INDEX: 26.66 KG/M2 | SYSTOLIC BLOOD PRESSURE: 128 MMHG | WEIGHT: 160 LBS | HEIGHT: 65 IN | HEART RATE: 80 BPM | DIASTOLIC BLOOD PRESSURE: 69 MMHG

## 2018-09-26 DIAGNOSIS — Z17.1 MALIGNANT NEOPLASM OF UPPER-INNER QUADRANT OF LEFT BREAST IN FEMALE, ESTROGEN RECEPTOR NEGATIVE (HCC): Primary | ICD-10-CM

## 2018-09-26 DIAGNOSIS — C50.212 MALIGNANT NEOPLASM OF UPPER-INNER QUADRANT OF LEFT BREAST IN FEMALE, ESTROGEN RECEPTOR NEGATIVE (HCC): Primary | ICD-10-CM

## 2018-09-26 NOTE — PROGRESS NOTES
HISTORY OF PRESENT ILLNESS  Shiela Anguiano is a 57 y.o. female. HPI NEW Patient presents for consultation at the request of Dr. Elijah Churchill for newly diagnosed LEFT breast cancer. This was detected from mammogram. The patient has no abnormal breast symptoms. No pain at biopsy site. No history of prior biopsies. No family history of breast or ovarian cancer. Breast cancer-  LEFT breast stereotactic biopsy 9/17/18. Pathology revealed LEFT breast IDC, grade 3, ER negative, ME positive 15%, HER 2 negative. Breast imaging-  Screening mammogram 8/10/18: BI-RADS 0. LEFT breast diagnostic mammogram and US 9/14/18: BI-RADS 4. LEFT breast stereotactic biopsy 9/17/18. Pathology revealed LEFT breast IDC, grade 3, ER negative, ME positive 15%, HER 2 negative. Breast MRI 9/20/18:    Study Result   INDICATION: Left breast carcinoma.     COMPARISON: Mammography and sonography from August and September 2018.     TECHNIQUE:  Bilateral breast MRI was performed using a dedicated breast coil without  compression with the patient in the prone position. Precontrast T1-weighted  images with fat suppression were obtained followed by bolus injection of 7 mL  Gadavist. Postcontrast dynamic and high-resolution images were acquired. T2-weighted axial imaging with fat suppression was also performed. The images  were analyzed using CAD analysis, enhancement curves, digital subtraction, and 2  and 3 dimensional reconstructions.     FINDINGS:  There is minimal background parenchymal enhancement and scattered fibroglandular  tissue. There are scattered sub-5 mm foci of enhancement in the bilateral  breasts, left greater than right.     Right breast:  No suspicious enhancing foci. No axillary or internal mammary chain  lymphadenopathy.     Left breast:  An 8 x 9 x 8 mm round mass in the posterior third of the upper inner quadrant  corresponds to the known breast carcinoma.  There is a biopsy clip within it.      Multiple enhancing foci in the middle third of the outer left breast along the  nipple line are arranged in a segmental distribution. In contrast, there are  very few foci in the right breast and medial left breast. Most of the outer left  breast foci measure less than 5 mm. Two adjacent foci measure slightly larger  than 5 mm each, show washout enhancement, and together measure approximately 16  mm. There is subtle linear enhancement immediately posterior to these, along the  same segmental distribution (image [id number]). Inclusive of this linear  enhancement, the segmental enhancement measures 27 mm in anteroposterior  dimension. Inclusive of all foci, the segmental enhancement measures  approximately 30 x 22 mm. No axillary or internal mammary chain lymphadenopathy.     A summary portfolio with key images has been sent from kinetic analysis software  to PACS.     IMPRESSION  IMPRESSION:   1. Segmental enhancement in the middle third of the outer left breast. BI-RADS  4A. 2. Known left breast carcinoma. BI-RADS 6.  3. No suspicious enhancing foci in the right breast.  4. No lymphadenopathy. 5. Overall assessment: BI-RADS Assessment Category 4A: Suspicious abnormality. 6. Recommendation: MRI guided biopsy of left segmental enhancement. Past Medical History:   Diagnosis Date    Frequent headaches     Migraine     24 years     Past Surgical History:   Procedure Laterality Date    HX DILATION AND CURETTAGE      2010     Social History     Social History    Marital status: SINGLE     Spouse name: N/A    Number of children: N/A    Years of education: N/A     Occupational History    Not on file. Social History Main Topics    Smoking status: Never Smoker    Smokeless tobacco: Never Used    Alcohol use No      Comment: social    Drug use: No    Sexual activity: Not on file     Other Topics Concern    Not on file     Social History Narrative     OB History     Obstetric Comments    Menarche:  17. LMP: 9/26/18.   # of Children: ?.  Age at Delivery of First Child:  26.   Hysterectomy/oophorectomy:  NO/NO. Breast Bx:  yes. Hx of Breast Feeding:  no. BCP:  ? . Hormone therapy:  ? .               Current Outpatient Prescriptions:     ergocalciferol, vitamin D2, (VITAMIN D2 PO), Take  by mouth., Disp: , Rfl:     [START ON 10/7/2018] SUMAtriptan succinate (ZEMBRACE SYMTOUCH) 3 mg/0.5 mL pnij, 1 Syringe by SubCUTAneous route as needed. 1 at HA onset and repeat q 1 hour until WHELAN relieved or until used 4 in 24 hours, Disp: 8 Syringe, Rfl: 3    cyclobenzaprine (FLEXERIL) 10 mg tablet, TK 1 T PO TID PRN, Disp: , Rfl: 3    FIRST-TESTOSTERONE 2 % oint, daily. , Disp: , Rfl:     SUMAtriptan (IMITREX) 100 mg tablet, Take 1tab at the onset of HA Repeat in 2 hours. MAX 2 tabs  in 24hrs, Disp: 20 Tab, Rfl: 2    promethazine (PHENERGAN) 25 mg tablet, Take 1 Tab by mouth every six (6) hours as needed for Nausea., Disp: 30 Tab, Rfl: 0  No Known Allergies    Review of Systems   Constitutional: Positive for malaise/fatigue. HENT: Negative. Eyes: Negative. Respiratory: Negative. Cardiovascular: Negative. Gastrointestinal: Positive for nausea and vomiting. Genitourinary: Negative. Musculoskeletal: Negative. Skin: Negative. Neurological: Positive for dizziness. Endo/Heme/Allergies: Negative. Psychiatric/Behavioral: Negative. Physical Exam   Constitutional: She is oriented to person, place, and time. She appears well-developed and well-nourished. HENT:   Head: Normocephalic. Eyes: EOM are normal.   Neck: Neck supple. Cardiovascular: Intact distal pulses. Pulmonary/Chest: Effort normal. Right breast exhibits no inverted nipple, no mass, no nipple discharge, no skin change and no tenderness. Left breast exhibits no inverted nipple, no mass, no nipple discharge, no skin change and no tenderness. Breasts are symmetrical.   Clip and cavity seen at 9:00 on us left breast   Abdominal: Soft.    Musculoskeletal: Normal range of motion. Lymphadenopathy:     She has no cervical adenopathy. She has no axillary adenopathy. Neurological: She is alert and oriented to person, place, and time. Skin: Skin is warm and dry. Psychiatric: She has a normal mood and affect. Nursing note and vitals reviewed. ASSESSMENT and PLAN    ICD-10-CM ICD-9-CM    1. Malignant neoplasm of upper-inner quadrant of left breast in female, estrogen receptor negative (HCC) C50.212 174.2 MRI BX BREAST VAC LT 1ST LESION W/CLIP AND SPECIMEN    Z17.1 V86.1 [facility]-ANALYSIS     58 yo female with left breast IDC grade 3 Er negative Pr wp 15% and Her 2 dina negative. She is here with her . 90 minutes were spent face-to-face with the patient during this encounter and 90% of that time was spent on counseling and coordination of care. 1. Discussed lumpectomy and radiation vs mastectomy. Discussed reconstruction. MRI showed additional enhancement in outer quadrant. Will schedule mri guided biopsy. 2. Discussed sentinel lymph node biopsy. 3. Discussed external beam radiation. 4. Discussed hormone therapy. 5. Discussed the possibility of chemotherapy. Will send mammaprint and refer to med on. Likely behaves like triple negative breast cancer  6. Discussed genetic testing. Will send gene panel    Surgical plan dependent on additional biopsy results. If multicentric breast ca will need mastectomy, reconstruction. If gene + may need bilateral mastectomies. Will send mammaprint since pr weakly positive. If high risk will need chemo.

## 2018-09-26 NOTE — MR AVS SNAPSHOT
[address] [facility] 
[phone] Patient: Salazar Alston MRN: [id number] ZXV:[birth year] Visit Information Date & Time Provider Department Dept. Phone Encounter #  
 9/26/2018  8:30 AM Vinod Mcqueen  [address] [id number] Upcoming Health Maintenance Date Due Pneumococcal 19-64 Highest Risk (1 of 3 - PCV13) 2/2/1991 DTaP/Tdap/Td series (1 - Tdap) 2/2/1993 PAP AKA CERVICAL CYTOLOGY 2/2/1993 Influenza Age 5 to Adult 8/1/2018 Allergies as of 9/26/2018  Review Complete On: 9/26/2018 By: Sasha Solis RN No Known Allergies Current Immunizations  Never Reviewed No immunizations on file. Not reviewed this visit You Were Diagnosed With   
  
 Codes Comments Malignant neoplasm of upper-inner quadrant of left breast in female, estrogen receptor [facility]    -  Primary ICD-10-CM: R73.694, Z17.1 ICD-9-CM: 174.2, V86.1 Vitals BP Pulse Height(growth percentile) Weight(growth percentile) LMP BMI  
 128/69 80 5' 5\" (1.651 m) 160 lb (72.6 kg) 09/26/2018 26.63 kg/m2 OB Status Smoking Status Having regular periods Never Smoker Vitals History BMI and BSA Data Body Mass Index Body Surface Area  
 26.63 kg/m 2 1.82 m 2 Preferred Pharmacy Pharmacy Name Phone [facility] [address] [place] [address] [phone] Your Updated Medication List  
  
   
This list is accurate as of 9/26/18 11:05 AM.  Always use your most recent med list.  
  
  
  
  
 cyclobenzaprine 10 mg tablet Commonly known as:  FLEXERIL TK 1 T PO TID PRN  
  
 FIRST-TESTOSTERONE 2 % Oint Generic drug:  testosterone propionate  
daily. promethazine 25 mg tablet Commonly known as:  PHENERGAN  
 Take 1 Tab by mouth every six (6) hours as needed for Nausea. * SUMAtriptan 100 mg tablet Commonly known as:  IMITREX Take 1tab at the onset of HA Repeat in 2 hours. MAX 2 tabs  in 24hrs * SUMAtriptan succinate 3 mg/0.5 mL Pnij Commonly known as:  Estle Pill  
1 Syringe by SubCUTAneous route as needed. 1 at HA onset and repeat q 1 hour until WHELAN relieved or until used 4 in 24 hours Start taking on:  10/7/2018 VITAMIN D2 PO Take  by mouth. * Notice: This list has 2 medication(s) that are the same as other medications prescribed for you. Read the directions carefully, and ask your doctor or other care provider to review them with you. To-Do List   
 09/26/2018 Imaging:  MRI BX BREAST VAC LT 1ST LESION W/CLIP AND SPECIMEN Patient Instructions Breast Cancer: Care Instructions Your Care Instructions Breast cancer occurs when abnormal cells grow out of control in the breast. These cancer cells can spread within the breast, to nearby lymph nodes and other tissues, and to other parts of the body. Being treated for cancer can weaken your body, and you may feel very tired. Get the rest your body needs so you can feel better. Finding out that you have cancer is scary. You may feel many emotions and may need some help coping. Seek out family, friends, and counselors for support. You also can do things at home to make yourself feel better while you go through treatment. Call the [employer] (3-296.312.5126) or visit its website at [employer] for more information. Follow-up care is a key part of your treatment and safety. Be sure to make and go to all appointments, and call your doctor if you are having problems. It's also a good idea to know your test results and keep a list of the medicines you take. How can you care for yourself at home? · Take your medicines exactly as prescribed.  Call your doctor if you think you are having a problem with your medicine. You may get medicine for nausea and vomiting if you have these side effects. · Follow your doctor's instructions to relieve pain. Pain from cancer and surgery can almost always be controlled. Use pain medicine when you first notice pain, before it becomes severe. · Eat healthy food. If you do not feel like eating, try to eat food that has protein and extra calories to keep up your strength and prevent weight loss. Drink liquid meal replacements for extra calories and protein. Try to eat your main meal early. · Get some physical activity every day, but do not get too tired. Keep doing the hobbies you enjoy as your energy allows. · Do not smoke. Smoking can make your cancer worse. If you need help quitting, talk to your doctor about stop-smoking programs and medicines. These can increase your chances of quitting for good. · Take steps to control your stress and workload. Learn relaxation techniques. ¨ Share your feelings. Stress and tension affect our emotions. By expressing your feelings to others, you may be able to understand and cope with them. ¨ Consider joining a support group. Talking about a problem with your spouse, a good friend, or other people with similar problems is a good way to reduce tension and stress. ¨ Express yourself through art. Try writing, crafts, dance, or art to relieve stress. Some dance, writing, or art groups may be available just for people who have cancer. ¨ Be kind to your body and mind. Getting enough sleep, eating a healthy diet, and taking time to do things you enjoy can contribute to an overall feeling of balance in your life and can help reduce stress. ¨ Get help if you need it. Discuss your concerns with your doctor or counselor. · If you are vomiting or have diarrhea: ¨ Drink plenty of fluids (enough so that your urine is light yellow or clear like water) to prevent dehydration.  Choose water and other caffeine-free clear liquids. If you have kidney, heart, or liver disease and have to limit fluids, talk with your doctor before you increase the amount of fluids you drink. ¨ When you are able to eat, try clear soups, mild foods, and liquids until all symptoms are gone for 12 to 48 hours. Other good choices include dry toast, crackers, cooked cereal, and gelatin dessert, such as Jell-O. · If you have not already done so, prepare a list of advance directives. Advance directives are instructions to your doctor and family members about what kind of care you want if you become unable to speak or express yourself. When should you call for help? Call 911 anytime you think you may need emergency care. For example, call if: 
  · You passed out (lost consciousness).  
 Call your doctor now or seek immediate medical care if: 
  · You have a fever.  
  · You have abnormal bleeding.  
  · You think you have an infection.  
  · You have new or worse pain.  
  · You have new symptoms, such as a cough, belly pain, vomiting, diarrhea, or a rash.  
 Watch closely for changes in your health, and be sure to contact your doctor if: 
  · You are much more tired than usual.  
  · You have swollen glands in your armpits, groin, or neck.  
  · You do not get better as expected. Where can you learn more? Go to http://mark-monique.info/. Enter V321 in the search box to learn more about \"Breast Cancer: Care Instructions. \" Current as of: May 12, 2017 Content Version: 11.7 © 0968-6708 [employer], [employer]. Care instructions adapted under license by [employer] (which disclaims liability or warranty for this information). If you have questions about a medical condition or this instruction, always ask your healthcare professional. [place] [address] any warranty or liability for your use of this information. Introducing [facility]! [facility] introduces [employer] patient portal. Now you can access parts of your medical record, email your doctor's office, and request medication refills online. 1. In your internet browser, go to [email or web address]. [email or web address] 2. Click on the First Time User? Click Here link in the Sign In box. You will see the New Member Sign Up page. 3. Enter your [employer] Access Code exactly as it appears below. You will not need to use this code after youve completed the sign-up process. If you do not sign up before the expiration date, you must request a new code. · [employer] Access Code: [email or web address] Expires: 10/24/2018  1:24 PM 
 
4. Enter the last four digits of your Social Security Number (xxxx) and Date of Birth (mm/dd/yyyy) as indicated and click Submit. You will be taken to the next sign-up page. 5. Create a [employer]y ID. This will be your [employer] login ID and cannot be changed, so think of one that is secure and easy to remember. 6. Create a [employer] password. You can change your password at any time. 7. Enter your Password Reset Question and Answer. This can be used at a later time if you forget your password. 8. Enter your e-mail address. You will receive e-mail notification when new information is available in [address] 9. Click Sign Up. You can now view and download portions of your medical record. 10. Click the Download Summary menu link to download a portable copy of your medical information. If you have questions, please visit the Frequently Asked Questions section of the [employer] website. Remember, [employer] is NOT to be used for urgent needs. For medical emergencies, dial 911. Now available from your iPhone and Android! Please provide this summary of care documentation to your next provider. Your primary care clinician is listed as Florencio Horowitz. If you have any questions after today's visit, please call [phone].

## 2018-09-26 NOTE — COMMUNICATION BODY
HISTORY OF PRESENT ILLNESS  Chioma Adler is a 57 y.o. female. HPI NEW Patient presents for consultation at the request of Dr. Mariusz Jarquin for newly diagnosed LEFT breast cancer. This was detected from mammogram. The patient has no abnormal breast symptoms. No pain at biopsy site. No history of prior biopsies. No family history of breast or ovarian cancer. Breast cancer-  LEFT breast stereotactic biopsy 9/17/18. Pathology revealed LEFT breast IDC, grade 3, ER negative, DE positive 15%, HER 2 negative. Breast imaging-  Screening mammogram 8/10/18: BI-RADS 0. LEFT breast diagnostic mammogram and US 9/14/18: BI-RADS 4. LEFT breast stereotactic biopsy 9/17/18. Pathology revealed LEFT breast IDC, grade 3, ER negative, DE positive 15%, HER 2 negative. Breast MRI 9/20/18:    Study Result   INDICATION: Left breast carcinoma.     COMPARISON: Mammography and sonography from August and September 2018.     TECHNIQUE:  Bilateral breast MRI was performed using a dedicated breast coil without  compression with the patient in the prone position. Precontrast T1-weighted  images with fat suppression were obtained followed by bolus injection of 7 mL  Gadavist. Postcontrast dynamic and high-resolution images were acquired. T2-weighted axial imaging with fat suppression was also performed. The images  were analyzed using CAD analysis, enhancement curves, digital subtraction, and 2  and 3 dimensional reconstructions.     FINDINGS:  There is minimal background parenchymal enhancement and scattered fibroglandular  tissue. There are scattered sub-5 mm foci of enhancement in the bilateral  breasts, left greater than right.     Right breast:  No suspicious enhancing foci. No axillary or internal mammary chain  lymphadenopathy.     Left breast:  An 8 x 9 x 8 mm round mass in the posterior third of the upper inner quadrant  corresponds to the known breast carcinoma.  There is a biopsy clip within it.      Multiple enhancing foci in the middle third of the outer left breast along the  nipple line are arranged in a segmental distribution. In contrast, there are  very few foci in the right breast and medial left breast. Most of the outer left  breast foci measure less than 5 mm. Two adjacent foci measure slightly larger  than 5 mm each, show washout enhancement, and together measure approximately 16  mm. There is subtle linear enhancement immediately posterior to these, along the  same segmental distribution (image [id number]). Inclusive of this linear  enhancement, the segmental enhancement measures 27 mm in anteroposterior  dimension. Inclusive of all foci, the segmental enhancement measures  approximately 30 x 22 mm. No axillary or internal mammary chain lymphadenopathy.     A summary portfolio with key images has been sent from kinetic analysis software  to PACS.     IMPRESSION  IMPRESSION:   1. Segmental enhancement in the middle third of the outer left breast. BI-RADS  4A. 2. Known left breast carcinoma. BI-RADS 6.  3. No suspicious enhancing foci in the right breast.  4. No lymphadenopathy. 5. Overall assessment: BI-RADS Assessment Category 4A: Suspicious abnormality. 6. Recommendation: MRI guided biopsy of left segmental enhancement. Past Medical History:   Diagnosis Date    Frequent headaches     Migraine     22 years     Past Surgical History:   Procedure Laterality Date    HX DILATION AND CURETTAGE      2010     Social History     Social History    Marital status: SINGLE     Spouse name: N/A    Number of children: N/A    Years of education: N/A     Occupational History    Not on file. Social History Main Topics    Smoking status: Never Smoker    Smokeless tobacco: Never Used    Alcohol use No      Comment: social    Drug use: No    Sexual activity: Not on file     Other Topics Concern    Not on file     Social History Narrative     OB History     Obstetric Comments    Menarche:  17. LMP: 9/26/18.   # of Children: ?.  Age at Delivery of First Child:  26.   Hysterectomy/oophorectomy:  NO/NO. Breast Bx:  yes. Hx of Breast Feeding:  no. BCP:  ? . Hormone therapy:  ? .               Current Outpatient Prescriptions:     ergocalciferol, vitamin D2, (VITAMIN D2 PO), Take  by mouth., Disp: , Rfl:     [START ON 10/7/2018] SUMAtriptan succinate (ZEMBRACE SYMTOUCH) 3 mg/0.5 mL pnij, 1 Syringe by SubCUTAneous route as needed. 1 at HA onset and repeat q 1 hour until WHELAN relieved or until used 4 in 24 hours, Disp: 8 Syringe, Rfl: 3    cyclobenzaprine (FLEXERIL) 10 mg tablet, TK 1 T PO TID PRN, Disp: , Rfl: 3    FIRST-TESTOSTERONE 2 % oint, daily. , Disp: , Rfl:     SUMAtriptan (IMITREX) 100 mg tablet, Take 1tab at the onset of HA Repeat in 2 hours. MAX 2 tabs  in 24hrs, Disp: 20 Tab, Rfl: 2    promethazine (PHENERGAN) 25 mg tablet, Take 1 Tab by mouth every six (6) hours as needed for Nausea., Disp: 30 Tab, Rfl: 0  No Known Allergies    Review of Systems   Constitutional: Positive for malaise/fatigue. HENT: Negative. Eyes: Negative. Respiratory: Negative. Cardiovascular: Negative. Gastrointestinal: Positive for nausea and vomiting. Genitourinary: Negative. Musculoskeletal: Negative. Skin: Negative. Neurological: Positive for dizziness. Endo/Heme/Allergies: Negative. Psychiatric/Behavioral: Negative. Physical Exam   Constitutional: She is oriented to person, place, and time. She appears well-developed and well-nourished. HENT:   Head: Normocephalic. Eyes: EOM are normal.   Neck: Neck supple. Cardiovascular: Intact distal pulses. Pulmonary/Chest: Effort normal. Right breast exhibits no inverted nipple, no mass, no nipple discharge, no skin change and no tenderness. Left breast exhibits no inverted nipple, no mass, no nipple discharge, no skin change and no tenderness. Breasts are symmetrical.   Clip and cavity seen at 9:00 on us left breast   Abdominal: Soft.    Musculoskeletal: Normal range of motion. Lymphadenopathy:     She has no cervical adenopathy. She has no axillary adenopathy. Neurological: She is alert and oriented to person, place, and time. Skin: Skin is warm and dry. Psychiatric: She has a normal mood and affect. Nursing note and vitals reviewed. ASSESSMENT and PLAN    ICD-10-CM ICD-9-CM    1. Malignant neoplasm of upper-inner quadrant of left breast in female, estrogen receptor negative (HCC) C50.212 174.2 MRI BX BREAST VAC LT 1ST LESION W/CLIP AND SPECIMEN    Z17.1 V86.1 [facility]-ANALYSIS     58 yo female with left breast IDC grade 3 Er negative Pr wp 15% and Her 2 dina negative. She is here with her . 90 minutes were spent face-to-face with the patient during this encounter and 90% of that time was spent on counseling and coordination of care. 1. Discussed lumpectomy and radiation vs mastectomy. Discussed reconstruction. MRI showed additional enhancement in outer quadrant. Will schedule mri guided biopsy. 2. Discussed sentinel lymph node biopsy. 3. Discussed external beam radiation. 4. Discussed hormone therapy. 5. Discussed the possibility of chemotherapy. Will send mammaprint and refer to med on. Likely behaves like triple negative breast cancer  6. Discussed genetic testing. Will send gene panel    Surgical plan dependent on additional biopsy results. If multicentric breast ca will need mastectomy, reconstruction. If gene + may need bilateral mastectomies. Will send mammaprint since pr weakly positive. If high risk will need chemo.

## 2018-09-26 NOTE — PATIENT INSTRUCTIONS
Breast Cancer: Care Instructions  Your Care Instructions    Breast cancer occurs when abnormal cells grow out of control in the breast. These cancer cells can spread within the breast, to nearby lymph nodes and other tissues, and to other parts of the body. Being treated for cancer can weaken your body, and you may feel very tired. Get the rest your body needs so you can feel better. Finding out that you have cancer is scary. You may feel many emotions and may need some help coping. Seek out family, friends, and counselors for support. You also can do things at home to make yourself feel better while you go through treatment. Call the [employer] (2-850.217.1190) or visit its website at [employer] for more information. Follow-up care is a key part of your treatment and safety. Be sure to make and go to all appointments, and call your doctor if you are having problems. It's also a good idea to know your test results and keep a list of the medicines you take. How can you care for yourself at home? · Take your medicines exactly as prescribed. Call your doctor if you think you are having a problem with your medicine. You may get medicine for nausea and vomiting if you have these side effects. · Follow your doctor's instructions to relieve pain. Pain from cancer and surgery can almost always be controlled. Use pain medicine when you first notice pain, before it becomes severe. · Eat healthy food. If you do not feel like eating, try to eat food that has protein and extra calories to keep up your strength and prevent weight loss. Drink liquid meal replacements for extra calories and protein. Try to eat your main meal early. · Get some physical activity every day, but do not get too tired. Keep doing the hobbies you enjoy as your energy allows. · Do not smoke. Smoking can make your cancer worse. If you need help quitting, talk to your doctor about stop-smoking programs and medicines.  These can increase your chances of quitting for good. · Take steps to control your stress and workload. Learn relaxation techniques. ¨ Share your feelings. Stress and tension affect our emotions. By expressing your feelings to others, you may be able to understand and cope with them. ¨ Consider joining a support group. Talking about a problem with your spouse, a good friend, or other people with similar problems is a good way to reduce tension and stress. ¨ Express yourself through art. Try writing, crafts, dance, or art to relieve stress. Some dance, writing, or art groups may be available just for people who have cancer. ¨ Be kind to your body and mind. Getting enough sleep, eating a healthy diet, and taking time to do things you enjoy can contribute to an overall feeling of balance in your life and can help reduce stress. ¨ Get help if you need it. Discuss your concerns with your doctor or counselor. · If you are vomiting or have diarrhea:  ¨ Drink plenty of fluids (enough so that your urine is light yellow or clear like water) to prevent dehydration. Choose water and other caffeine-free clear liquids. If you have kidney, heart, or liver disease and have to limit fluids, talk with your doctor before you increase the amount of fluids you drink. ¨ When you are able to eat, try clear soups, mild foods, and liquids until all symptoms are gone for 12 to 48 hours. Other good choices include dry toast, crackers, cooked cereal, and gelatin dessert, such as Jell-O.  · If you have not already done so, prepare a list of advance directives. Advance directives are instructions to your doctor and family members about what kind of care you want if you become unable to speak or express yourself. When should you call for help? Call 911 anytime you think you may need emergency care.  For example, call if:    · You passed out (lost consciousness).    Call your doctor now or seek immediate medical care if:    · You have a fever.     · You have abnormal bleeding.     · You think you have an infection.     · You have new or worse pain.     · You have new symptoms, such as a cough, belly pain, vomiting, diarrhea, or a rash.    Watch closely for changes in your health, and be sure to contact your doctor if:    · You are much more tired than usual.     · You have swollen glands in your armpits, groin, or neck.     · You do not get better as expected. Where can you learn more? Go to http://mark-monique.info/. Enter V321 in the search box to learn more about \"Breast Cancer: Care Instructions. \"  Current as of: May 12, 2017  Content Version: 11.7  © 7138-3048 [employer]. Care instructions adapted under license by [employer] (which disclaims liability or warranty for this information). If you have questions about a medical condition or this instruction, always ask your healthcare professional. [address] any warranty or liability for your use of this information.

## 2018-09-26 NOTE — LETTER
9/26/2018 1:53 PM 
 
Patient:  Nubia Mittal YOB: 1970 Date of Visit: 9/26/2018 Dear Brenna August MD 
[address] [address] VIA Facsimile: [phone] 
 : Thank you for referring Ms. Oliver Olmedo to me for evaluation/treatment. Below are the relevant portions of my assessment and plan of care. HISTORY OF PRESENT ILLNESS Nubia Mittal is a 57 y.o. female. HPI NEW Patient presents for consultation at the request of Dr. Tracey Woodard for newly diagnosed LEFT breast cancer. This was detected from mammogram. The patient has no abnormal breast symptoms. No pain at biopsy site. No history of prior biopsies. No family history of breast or ovarian cancer. Breast cancer- 
LEFT breast stereotactic biopsy 9/17/18. Pathology revealed LEFT breast IDC, grade 3, ER negative, MA positive 15%, HER 2 negative. Breast imaging- 
Screening mammogram 8/10/18: BI-RADS 0. LEFT breast diagnostic mammogram and US 9/14/18: BI-RADS 4. LEFT breast stereotactic biopsy 9/17/18. Pathology revealed LEFT breast IDC, grade 3, ER negative, MA positive 15%, HER 2 negative. Breast MRI 9/20/18: 
 
Study Result INDICATION: Left breast carcinoma. 
  
COMPARISON: Mammography and sonography from August and September 2018. 
  
TECHNIQUE: 
Bilateral breast MRI was performed using a dedicated breast coil without 
compression with the patient in the prone position. Precontrast T1-weighted 
images with fat suppression were obtained followed by bolus injection of 7 mL Gadavist. Postcontrast dynamic and high-resolution images were acquired. T2-weighted axial imaging with fat suppression was also performed. The images 
were analyzed using CAD analysis, enhancement curves, digital subtraction, and 2 
and 3 dimensional reconstructions. 
  
FINDINGS: 
There is minimal background parenchymal enhancement and scattered fibroglandular 
tissue.  There are scattered sub-5 mm foci of enhancement in the bilateral breasts, left greater than right. 
  
Right breast: 
No suspicious enhancing foci. No axillary or internal mammary chain 
lymphadenopathy. 
  
Left breast: An 8 x 9 x 8 mm round mass in the posterior third of the upper inner quadrant 
corresponds to the known breast carcinoma. There is a biopsy clip within it.  
  
Multiple enhancing foci in the middle third of the outer left breast along the 
nipple line are arranged in a segmental distribution. In contrast, there are 
very few foci in the right breast and medial left breast. Most of the outer left 
breast foci measure less than 5 mm. Two adjacent foci measure slightly larger 
than 5 mm each, show washout enhancement, and together measure approximately 16 
mm. There is subtle linear enhancement immediately posterior to these, along the 
same segmental distribution (image [id number]). Inclusive of this linear 
enhancement, the segmental enhancement measures 27 mm in anteroposterior 
dimension. Inclusive of all foci, the segmental enhancement measures 
approximately 30 x 22 mm. No axillary or internal mammary chain lymphadenopathy. 
  
A summary portfolio with key images has been sent from kinetic analysis software 
to PACS. 
  
IMPRESSION IMPRESSION:  
1. Segmental enhancement in the middle third of the outer left breast. BI-RADS 
4A. 2. Known left breast carcinoma. BI-RADS 6. 
3. No suspicious enhancing foci in the right breast. 
4. No lymphadenopathy. 5. Overall assessment: BI-RADS Assessment Category 4A: Suspicious abnormality. 6. Recommendation: MRI guided biopsy of left segmental enhancement. Past Medical History:  
Diagnosis Date  Frequent headaches  Migraine 22 years Past Surgical History:  
Procedure Laterality Date  HX DILATION AND CURETTAGE    
 2010 Social History Social History  Marital status: SINGLE Spouse name: N/A  
 Number of children: N/A  
 Years of education: N/A Occupational History  Not on file. Social History Main Topics  Smoking status: Never Smoker  Smokeless tobacco: Never Used  Alcohol use No  
   Comment: social  
 Drug use: No  
 Sexual activity: Not on file Other Topics Concern  Not on file Social History Narrative OB History Obstetric Comments Menarche:  17. LMP: 9/26/18. # of Children:  ?. Age at Delivery of First Child:  26.   Hysterectomy/oophorectomy:  NO/NO. Breast Bx:  yes. Hx of Breast Feeding:  no. BCP:  ? . Hormone therapy:  ? .  
 
  
  
 
 
Current Outpatient Prescriptions:  
  ergocalciferol, vitamin D2, (VITAMIN D2 PO), Take  by mouth., Disp: , Rfl:  
  [START ON 10/7/2018] SUMAtriptan succinate (ZEMBRACE SYMTOUCH) 3 mg/0.5 mL pnij, 1 Syringe by SubCUTAneous route as needed. 1 at HA onset and repeat q 1 hour until WHELAN relieved or until used 4 in 24 hours, Disp: 8 Syringe, Rfl: 3   cyclobenzaprine (FLEXERIL) 10 mg tablet, TK 1 T PO TID PRN, Disp: , Rfl: 3 
  FIRST-TESTOSTERONE 2 % oint, daily. , Disp: , Rfl:  
  SUMAtriptan (IMITREX) 100 mg tablet, Take 1tab at the onset of HA Repeat in 2 hours. MAX 2 tabs  in 24hrs, Disp: 20 Tab, Rfl: 2 
  promethazine (PHENERGAN) 25 mg tablet, Take 1 Tab by mouth every six (6) hours as needed for Nausea., Disp: 30 Tab, Rfl: 0 No Known Allergies Review of Systems Constitutional: Positive for malaise/fatigue. HENT: Negative. Eyes: Negative. Respiratory: Negative. Cardiovascular: Negative. Gastrointestinal: Positive for nausea and vomiting. Genitourinary: Negative. Musculoskeletal: Negative. Skin: Negative. Neurological: Positive for dizziness. Endo/Heme/Allergies: Negative. Psychiatric/Behavioral: Negative. Physical Exam  
Constitutional: She is oriented to person, place, and time. She appears well-developed and well-nourished. HENT:  
Head: Normocephalic. Eyes: EOM are normal.  
Neck: Neck supple. Cardiovascular: Intact distal pulses. Pulmonary/Chest: Effort normal. Right breast exhibits no inverted nipple, no mass, no nipple discharge, no skin change and no tenderness. Left breast exhibits no inverted nipple, no mass, no nipple discharge, no skin change and no tenderness. Breasts are symmetrical.  
Clip and cavity seen at 9:00 on us left breast  
Abdominal: Soft. Musculoskeletal: Normal range of motion. Lymphadenopathy:  
  She has no cervical adenopathy. She has no axillary adenopathy. Neurological: She is alert and oriented to person, place, and time. Skin: Skin is warm and dry. Psychiatric: She has a normal mood and affect. Nursing note and vitals reviewed. ASSESSMENT and PLAN 
  ICD-10-CM ICD-9-CM 1. Malignant neoplasm of upper-inner quadrant of left breast in female, estrogen receptor negative (HCC) C50.212 174.2 MRI BX BREAST VAC LT 1ST LESION W/CLIP AND SPECIMEN  
 Z17.1 V86.1 [facility]-ANALYSIS  
 
56 yo female with left breast IDC grade 3 Er negative Pr wp 15% and Her 2 dina negative. She is here with her . 90 minutes were spent face-to-face with the patient during this encounter and 90% of that time was spent on counseling and coordination of care. 1. Discussed lumpectomy and radiation vs mastectomy. Discussed reconstruction. MRI showed additional enhancement in outer quadrant. Will schedule mri guided biopsy. 2. Discussed sentinel lymph node biopsy. 3. Discussed external beam radiation. 4. Discussed hormone therapy. 5. Discussed the possibility of chemotherapy. Will send mammaprint and refer to med on. Likely behaves like triple negative breast cancer 6. Discussed genetic testing. Will send gene panel Surgical plan dependent on additional biopsy results. If multicentric breast ca will need mastectomy, reconstruction. If gene + may need bilateral mastectomies. Will send mammaprint since pr weakly positive. If high risk will need chemo. If you have questions, please do not hesitate to call me. I look forward to following Ms. Reva Delgadillo along with you. Sincerely, Shiraz Loja MD

## 2018-09-27 ENCOUNTER — TELEPHONE (OUTPATIENT)
Dept: MAMMOGRAPHY | Age: 48
End: 2018-09-27

## 2018-09-27 ENCOUNTER — DOCUMENTATION ONLY (OUTPATIENT)
Dept: SURGERY | Age: 48
End: 2018-09-27

## 2018-09-27 NOTE — TELEPHONE ENCOUNTER
9/26/2018 - 2330 - pt left voicemail that she would return a call in the morning.    9/27/2018 - 0830 - pt left voicemail to return call to her at [phone] or [phone].    9/27/2018 - 9900 - returned call to pt. And pt. Agreed to appt. Date/time/place - [facility] MRI, October 12, [email or web address] (pt to arrive at 12 noon to register for her procedure). 9/27/2018 -4312 - called Divya Blackwell, to place pt. On schedule as above. Beckey Door states she is scheduled.

## 2018-09-27 NOTE — PROGRESS NOTES
MRI biopsy:  Appointments for this Order   10/12/2018 1300 - 90 min [facility] MRI 2 (Resource) [facility] [employer]

## 2018-10-05 DIAGNOSIS — Z17.1 MALIGNANT NEOPLASM OF UPPER-INNER QUADRANT OF LEFT BREAST IN FEMALE, ESTROGEN RECEPTOR NEGATIVE (HCC): Primary | ICD-10-CM

## 2018-10-05 DIAGNOSIS — C50.212 MALIGNANT NEOPLASM OF UPPER-INNER QUADRANT OF LEFT BREAST IN FEMALE, ESTROGEN RECEPTOR NEGATIVE (HCC): Primary | ICD-10-CM

## 2018-10-11 ENCOUNTER — DOCUMENTATION ONLY (OUTPATIENT)
Dept: SURGERY | Age: 48
End: 2018-10-11

## 2018-10-11 NOTE — PROGRESS NOTES
The patient was called to let her know that her genetic testing results negative. I left a message for her to call our office for the results.

## 2018-10-12 ENCOUNTER — TELEPHONE (OUTPATIENT)
Dept: SURGERY | Age: 48
End: 2018-10-12

## 2018-10-12 ENCOUNTER — HOSPITAL ENCOUNTER (OUTPATIENT)
Dept: MAMMOGRAPHY | Age: 48
Discharge: HOME | End: 2018-10-12
Attending: SURGERY
Payer: COMMERCIAL

## 2018-10-12 ENCOUNTER — HOSPITAL ENCOUNTER (OUTPATIENT)
Dept: MRI IMAGING | Age: 48
Discharge: HOME | End: 2018-10-12
Attending: SURGERY
Payer: COMMERCIAL

## 2018-10-12 VITALS
BODY MASS INDEX: 26.66 KG/M2 | HEIGHT: 65 IN | OXYGEN SATURATION: 100 % | SYSTOLIC BLOOD PRESSURE: 121 MMHG | RESPIRATION RATE: 20 BRPM | WEIGHT: 160 LBS | DIASTOLIC BLOOD PRESSURE: 70 MMHG | TEMPERATURE: 98 F | HEART RATE: 72 BPM

## 2018-10-12 DIAGNOSIS — C50.212 MALIGNANT NEOPLASM OF UPPER-INNER QUADRANT OF LEFT BREAST IN FEMALE, ESTROGEN RECEPTOR NEGATIVE (HCC): ICD-10-CM

## 2018-10-12 DIAGNOSIS — Z17.1 MALIGNANT NEOPLASM OF UPPER-INNER QUADRANT OF LEFT BREAST IN FEMALE, ESTROGEN RECEPTOR NEGATIVE (HCC): ICD-10-CM

## 2018-10-12 DIAGNOSIS — R92.8 ABNORMAL MAMMOGRAM: ICD-10-CM

## 2018-10-12 PROCEDURE — A9585 GADOBUTROL INJECTION: HCPCS | Performed by: SURGERY

## 2018-10-12 PROCEDURE — 74011250636 HC RX REV CODE- 250/636: Performed by: SURGERY

## 2018-10-12 PROCEDURE — 77058 MRI BREAST LT W WO CONT: CPT

## 2018-10-12 RX ORDER — LIDOCAINE HYDROCHLORIDE AND EPINEPHRINE 10; 10 MG/ML; UG/ML
1.5 INJECTION, SOLUTION INFILTRATION; PERINEURAL ONCE
Status: DISPENSED | OUTPATIENT
Start: 2018-10-12 | End: 2018-10-13

## 2018-10-12 RX ADMIN — GADOBUTROL 7 ML: 604.72 INJECTION INTRAVENOUS at 14:56

## 2018-10-12 NOTE — PROGRESS NOTES
Pt arrived this morning at 0800 stating she was unsure of the time of her procedure. Shanell Rhodes, MRI tech, explained to pt. That we were unable to do her procedure this morning and her appt. Was suppose to be today at 1:00 p.m. And she will need to return by 12:00 noon for her procedure. Pt just returned at  for her procedure accomp. By her , True Page. Pt awake, alert and oriented x 3. Pt states cramping in lower abd. \"like menstrual type cause I am about to start my cycle\".

## 2018-10-18 ENCOUNTER — DOCUMENTATION ONLY (OUTPATIENT)
Dept: SURGERY | Age: 48
End: 2018-10-18

## 2018-10-18 ENCOUNTER — OFFICE VISIT (OUTPATIENT)
Dept: ONCOLOGY | Age: 48
End: 2018-10-18

## 2018-10-18 VITALS
BODY MASS INDEX: 26.82 KG/M2 | RESPIRATION RATE: 16 BRPM | SYSTOLIC BLOOD PRESSURE: 117 MMHG | WEIGHT: 161 LBS | TEMPERATURE: 96.6 F | HEIGHT: 65 IN | HEART RATE: 81 BPM | DIASTOLIC BLOOD PRESSURE: 47 MMHG | OXYGEN SATURATION: 100 %

## 2018-10-18 DIAGNOSIS — C50.212 MALIGNANT NEOPLASM OF UPPER-INNER QUADRANT OF LEFT BREAST IN FEMALE, ESTROGEN RECEPTOR NEGATIVE (HCC): Primary | ICD-10-CM

## 2018-10-18 DIAGNOSIS — Z17.1 MALIGNANT NEOPLASM OF UPPER-INNER QUADRANT OF LEFT BREAST IN FEMALE, ESTROGEN RECEPTOR NEGATIVE (HCC): Primary | ICD-10-CM

## 2018-10-18 NOTE — PROGRESS NOTES
Joellen Mcburney is a 57 y.o. female new patient referred by Dr. Lakeshia Ceja to provider for left breast cancer. Patient's ER -ve, ND +ve & HER2 -ve. Mammoprint high risk. Patient accompanied by spouse to today's appt. Patient works at [employer]. Non smoker.     Visit Vitals  /47 (BP 1 Location: Left arm, BP Patient Position: Sitting)   Pulse 81   Temp 96.6 °F (35.9 °C) (Oral)   Resp 16   Ht 5' 5\" (1.651 m)   Wt 161 lb (73 kg)   LMP 09/26/2018   SpO2 100%   BMI 26.79 kg/m²

## 2018-10-18 NOTE — PROGRESS NOTES
Oncology Consultation Note        Patient: Simba Castillo MRN: [id number]  SSN: [id number]    YOB: 1970  Age: 57 y.o. Sex: female      Subjective:      Simba Castillo is a 57 y.o. female who I am seeing in consultation per request of Dr. Jorge Luis Weir for a new diagnosis of left sided invasive breast carcinoma. She underwent a screening mammogram and was noted to have a density in the upper inner quadrant of the left breast. A biopsy of the lesion showed a diagnosis of invasive breast carcinoma ER -ve PA 15% and Her 2 negative. An MRI did not reveal any additional abnormalities. She comes in to discuss the next steps. She denies pain or nipple discharge. Review of Systems:    Constitutional: negative  Eyes: negative  Ears, Nose, Mouth, Throat, and Face: negative  Respiratory: negative  Cardiovascular: negative  Gastrointestinal: negative  Genitourinary:negative  Integument/Breast: negative  Hematologic/Lymphatic: negative  Musculoskeletal:negative  Neurological: negative        Past Medical History:   Diagnosis Date    Frequent headaches     Migraine     22 years     Past Surgical History:   Procedure Laterality Date    HX DILATION AND CURETTAGE      2010      Family History   Problem Relation Age of Onset    Headache Mother     Headache Maternal Aunt      Social History     Tobacco Use    Smoking status: Never Smoker    Smokeless tobacco: Never Used   Substance Use Topics    Alcohol use: No     Comment: social      Prior to Admission medications    Medication Sig Start Date End Date Taking? Authorizing Provider   MULTIVITAMIN PO Take  by mouth. Yes Provider, Historical   ergocalciferol, vitamin D2, (VITAMIN D2 PO) Take  by mouth. Yes Provider, Historical   SUMAtriptan succinate (ZEMBRACE SYMTOUCH) 3 mg/0.5 mL pnij 1 Syringe by SubCUTAneous route as needed.  1 at HA onset and repeat q 1 hour until WHELAN relieved or until used 4 in 24 hours 10/7/18  Yes Arnoldo Burnham NP   cyclobenzaprine (FLEXERIL) 10 mg tablet TK 1 T PO TID PRN 3/13/18  Yes Provider, Historical   SUMAtriptan (IMITREX) 100 mg tablet Take 1tab at the onset of HA Repeat in 2 hours. MAX 2 tabs  in 24hrs 3/22/18  Yes Mariya Tobar NP   promethazine (PHENERGAN) 25 mg tablet Take 1 Tab by mouth every six (6) hours as needed for Nausea. 3/22/18  Yes Mariya Tobar NP   FIRST-TESTOSTERONE 2 % oint daily. 3/10/18   Provider, Historical              Allergies   Allergen Reactions    Latex Other (comments)           Objective:     Vitals:    10/18/18 0841   BP: 117/47   Pulse: 81   Resp: 16   Temp: 96.6 °F (35.9 °C)   TempSrc: Oral   SpO2: 100%   Weight: 161 lb (73 kg)   Height: 5' 5\" (1.651 m)            Physical Exam:    GENERAL: alert, cooperative, no distress, appears stated age  EYE: negative  LYMPHATIC: Cervical, supraclavicular, and axillary nodes normal.   THROAT & NECK: normal and no erythema or exudates noted. LUNG: clear to auscultation bilaterally  HEART: regular rate and rhythm  ABDOMEN: soft, non-tender  EXTREMITIES:  no edema  SKIN: Normal.  NEUROLOGIC: negative            Assessment:     1. Left breast carcinoma:  T1b N0 M0 (Stage I) infiltrating ductal carcinoma, Tumor size 0.8 mm, LN -v3, grade 3, ER 0, PA 15%, Her 2 -ve        ECOG PS 0  Intent of Treatment - curative  Prognosis- excellent    I spent 65 minute with the patient in a face-to-face encounter. I explained her the stage of the disease, pathophysiology of the disease and the treatment approaches. I answered all her questions. More than 50% of the time was utilized in education, counseling and co-ordination of care. Since the disease is small, it is amenable to a lumpectomy. I discussed the case with Dr. Cornelius Moctezuma who agrees. The decision to use adjuvant chemotherapy would depend on final staging of the tumor based on the pathology report. I gave her some basic information about adjuvant treatments and decision process of the treatment.        Plan: 1. Proceed with lumpectomy  2. Return 3 weeks after surgery      Signed By: Karla Rudolph MD     October 18, 2018           CC. Wilman Machado MD  CC.  Ivan Gracia MD

## 2018-10-22 ENCOUNTER — TELEPHONE (OUTPATIENT)
Dept: MAMMOGRAPHY | Age: 48
End: 2018-10-22

## 2018-10-22 ENCOUNTER — TELEPHONE (OUTPATIENT)
Dept: SURGERY | Age: 48
End: 2018-10-22

## 2018-10-22 DIAGNOSIS — Z17.1 MALIGNANT NEOPLASM OF UPPER-INNER QUADRANT OF LEFT BREAST IN FEMALE, ESTROGEN RECEPTOR NEGATIVE (HCC): Primary | ICD-10-CM

## 2018-10-22 DIAGNOSIS — C50.212 MALIGNANT NEOPLASM OF UPPER-INNER QUADRANT OF LEFT BREAST IN FEMALE, ESTROGEN RECEPTOR NEGATIVE (HCC): Primary | ICD-10-CM

## 2018-10-22 NOTE — TELEPHONE ENCOUNTER
I received a call from [facility] in radiology at [facility] who states she received a call from the patient stating after her second MRI guided biopsy she can now palpate a lump in her neck on the LEFT. I left a message for her to call us back.

## 2018-10-22 NOTE — TELEPHONE ENCOUNTER
Ms. Robyn Huitron called asking who to follow up with concerning an \"enlarged lump on the left side of my neck area, it came up since 2nd MRI \". I referred her to Dr. Saturnino Catalan since she has been following her and surgical intervention is planned. Ms. Robyn Huitron states that she has left a message. I spoke w/Beatriz, Dr. Cherryl Peabody nurse, concerning pt concern and she will be following up with her about her concern.

## 2018-10-22 NOTE — TELEPHONE ENCOUNTER
Patient called and left message on nurse voicemail wanting to speak with Dr. Hammad Cordon. She feels new lump on LEFT neck. She has appointment on 10/26/18. She was initially calling to see if Dr. Hammad Cordon would do appt over the phone and I told her we do not do this, but with this new concern, she will be coming in on Friday for her already scheduled appointment.

## 2018-10-26 ENCOUNTER — OFFICE VISIT (OUTPATIENT)
Dept: SURGERY | Age: 48
End: 2018-10-26

## 2018-10-26 ENCOUNTER — TELEPHONE (OUTPATIENT)
Dept: SURGERY | Age: 48
End: 2018-10-26

## 2018-10-26 VITALS
HEIGHT: 65 IN | DIASTOLIC BLOOD PRESSURE: 70 MMHG | BODY MASS INDEX: 26.82 KG/M2 | HEART RATE: 75 BPM | WEIGHT: 161 LBS | SYSTOLIC BLOOD PRESSURE: 108 MMHG

## 2018-10-26 DIAGNOSIS — C50.212 MALIGNANT NEOPLASM OF UPPER-INNER QUADRANT OF LEFT BREAST IN FEMALE, ESTROGEN RECEPTOR NEGATIVE (HCC): Primary | ICD-10-CM

## 2018-10-26 DIAGNOSIS — Z17.1 MALIGNANT NEOPLASM OF UPPER-INNER QUADRANT OF LEFT BREAST IN FEMALE, ESTROGEN RECEPTOR NEGATIVE (HCC): Primary | ICD-10-CM

## 2018-10-26 DIAGNOSIS — R59.0 SUPRACLAVICULAR ADENOPATHY: ICD-10-CM

## 2018-10-26 DIAGNOSIS — R59.0 SUPRACLAVICULAR ADENOPATHY: Primary | ICD-10-CM

## 2018-10-26 NOTE — PATIENT INSTRUCTIONS

## 2018-10-29 ENCOUNTER — HOSPITAL ENCOUNTER (OUTPATIENT)
Dept: ULTRASOUND IMAGING | Age: 48
Discharge: HOME | End: 2018-10-29
Attending: INTERNAL MEDICINE
Payer: COMMERCIAL

## 2018-10-29 ENCOUNTER — TELEPHONE (OUTPATIENT)
Dept: ONCOLOGY | Age: 48
End: 2018-10-29

## 2018-10-29 ENCOUNTER — OFFICE VISIT (OUTPATIENT)
Dept: ONCOLOGY | Age: 48
End: 2018-10-29

## 2018-10-29 VITALS
HEIGHT: 65 IN | WEIGHT: 154.6 LBS | RESPIRATION RATE: 16 BRPM | BODY MASS INDEX: 25.76 KG/M2 | OXYGEN SATURATION: 99 % | SYSTOLIC BLOOD PRESSURE: 113 MMHG | HEART RATE: 80 BPM | TEMPERATURE: 98.2 F | DIASTOLIC BLOOD PRESSURE: 68 MMHG

## 2018-10-29 DIAGNOSIS — C50.212 MALIGNANT NEOPLASM OF UPPER-INNER QUADRANT OF LEFT BREAST IN FEMALE, ESTROGEN RECEPTOR NEGATIVE (HCC): ICD-10-CM

## 2018-10-29 DIAGNOSIS — Z17.1 MALIGNANT NEOPLASM OF UPPER-INNER QUADRANT OF LEFT BREAST IN FEMALE, ESTROGEN RECEPTOR NEGATIVE (HCC): ICD-10-CM

## 2018-10-29 DIAGNOSIS — Z17.1 MALIGNANT NEOPLASM OF UPPER-INNER QUADRANT OF LEFT BREAST IN FEMALE, ESTROGEN RECEPTOR NEGATIVE (HCC): Primary | ICD-10-CM

## 2018-10-29 DIAGNOSIS — C50.212 MALIGNANT NEOPLASM OF UPPER-INNER QUADRANT OF LEFT BREAST IN FEMALE, ESTROGEN RECEPTOR NEGATIVE (HCC): Primary | ICD-10-CM

## 2018-10-29 PROCEDURE — 77030003503 US GUIDE FINE NDL ASP W IMAGE

## 2018-10-29 PROCEDURE — 88305 TISSUE EXAM BY PATHOLOGIST: CPT | Performed by: INTERNAL MEDICINE

## 2018-10-29 PROCEDURE — 88173 CYTOPATH EVAL FNA REPORT: CPT | Performed by: INTERNAL MEDICINE

## 2018-10-29 PROCEDURE — 74011250636 HC RX REV CODE- 250/636: Performed by: RADIOLOGY

## 2018-10-29 PROCEDURE — 88360 TUMOR IMMUNOHISTOCHEM/MANUAL: CPT | Performed by: INTERNAL MEDICINE

## 2018-10-29 PROCEDURE — 88172 CYTP DX EVAL FNA 1ST EA SITE: CPT | Performed by: INTERNAL MEDICINE

## 2018-10-29 RX ORDER — LIDOCAINE HYDROCHLORIDE 10 MG/ML
10 INJECTION INFILTRATION; PERINEURAL
Status: COMPLETED | OUTPATIENT
Start: 2018-10-29 | End: 2018-10-29

## 2018-10-29 RX ADMIN — LIDOCAINE HYDROCHLORIDE 10 ML: 10 INJECTION, SOLUTION INFILTRATION; PERINEURAL at 14:00

## 2018-10-29 NOTE — PROGRESS NOTES
Follow-up Note        Patient: Harry Lau MRN: [id number]  SSN: [id number]    YOB: 1970  Age: 57 y.o. Sex: female      Subjective:      Harry Lau is a 57 y.o. female with a new diagnosis of left sided invasive breast carcinoma. She underwent a screening mammogram and was noted to have a density in the upper inner quadrant of the left breast. A biopsy of the lesion showed a diagnosis of invasive breast carcinoma ER -ve ID 15% and Her 2 negative. An MRI did not reveal any additional abnormalities. She is here today with complaints of a left neck mass. Review of Systems:    Constitutional: negative  Eyes: negative  Ears, Nose, Mouth, Throat, and Face: negative  Respiratory: negative  Cardiovascular: negative  Gastrointestinal: negative  Genitourinary:negative  Integument/Breast: negative  Hematologic/Lymphatic: palpable left neck mass  Musculoskeletal:negative  Neurological: negative        Past Medical History:   Diagnosis Date    Frequent headaches     Migraine     22 years     Past Surgical History:   Procedure Laterality Date    HX DILATION AND CURETTAGE      2010      Family History   Problem Relation Age of Onset    Headache Mother     Headache Maternal Aunt      Social History     Tobacco Use    Smoking status: Never Smoker    Smokeless tobacco: Never Used   Substance Use Topics    Alcohol use: No     Comment: social      Prior to Admission medications    Medication Sig Start Date End Date Taking? Authorizing Provider   MULTIVITAMIN PO Take  by mouth. Yes Provider, Historical   ergocalciferol, vitamin D2, (VITAMIN D2 PO) Take  by mouth. Yes Provider, Historical   SUMAtriptan succinate (ZEMBRACE SYMTOUCH) 3 mg/0.5 mL pnij 1 Syringe by SubCUTAneous route as needed.  1 at HA onset and repeat q 1 hour until WHELAN relieved or until used 4 in 24 hours 10/7/18  Yes Consuelo Robbins NP   cyclobenzaprine (FLEXERIL) 10 mg tablet TK 1 T PO TID PRN 3/13/18  Yes Provider, Historical FIRST-TESTOSTERONE 2 % oint daily. 3/10/18  Yes Provider, Historical   SUMAtriptan (IMITREX) 100 mg tablet Take 1tab at the onset of HA Repeat in 2 hours. MAX 2 tabs  in 24hrs 3/22/18  Yes Blima Apley, NP   promethazine (PHENERGAN) 25 mg tablet Take 1 Tab by mouth every six (6) hours as needed for Nausea. 3/22/18  Yes Blima Apley, NP              Allergies   Allergen Reactions    Latex Other (comments)           Objective:     Vitals:    10/29/18 0957   BP: 113/68   Pulse: 80   Resp: 16   Temp: 98.2 °F (36.8 °C)   TempSrc: Oral   SpO2: 99%   Weight: 154 lb 9.6 oz (70.1 kg)   Height: 5' 5\" (1.651 m)            Physical Exam:    GENERAL: alert, cooperative, no distress, appears stated age  EYE: negative  LYMPHATIC: palpable left supraclavicular/lower cervical node  THROAT & NECK: normal and no erythema or exudates noted. LUNG: clear to auscultation bilaterally  HEART: regular rate and rhythm  ABDOMEN: soft, non-tender  EXTREMITIES:  no edema  SKIN: Normal.  NEUROLOGIC: negative        Assessment:     1. Left breast carcinoma:  T1b N0 M0 (Stage I) infiltrating ductal carcinoma, Tumor size 0.8 mm, LN -v3, grade 3, ER 0, ND 15%, Her 2 -ve        ECOG PS 0  Intent of Treatment - curative  Prognosis- excellent    New palpable lump of the supraclavicular/lower cervical area of the left neck concerning for metastatic disease. PET/CT   Biopsy of left neck mass    If PET/CT and biopsy are negative, will proceed with lumpectomy. If biopsy shows metastatic disease, will plan for neoadjuvant chemotherapy. Plan:     > PET/CT  > US guided FNA left neck mass today      Signed by: Darling Calvillo MD                     October 29, 2018      CC. Ellaree Riedel, MD  CC.  Andrés Soni MD

## 2018-10-29 NOTE — TELEPHONE ENCOUNTER
ONCOLOGY NURSE NAVIGATOR    TC from Med Onc NP requesting assistance w/ scheduling US of L supraclavicular lymph node, which was ordered by Dr. Martha Jaramillo. Pt in Med Onc office this AM.  TC to Saira Howell and US to coordinate FNA of node. PET Scan also ordered. ONN to Med Onc office, advised [facility] 1[facility]. Verified w/ pt, she is not on blood thinners. Pt concerned about work restrictions as states she does lifting w/ her job. Advised Dr. Martha Jaramillo and office of [address] [place] [address],[address] time.        Janine Joseph RN

## 2018-10-29 NOTE — PROGRESS NOTES
HISTORY OF PRESENT ILLNESS  Vilma Pereira is a 57 y.o. female. HPI ESTABLISHED patient here today for follow up LEFT breast cancer. The patient can now palpate a lump on the  RIGHT side of her neck. It is not tender to palpation but she is concerned and would like this evaluated. She would like to also discuss when her breast surgery will be scheduled? 1. Left breast carcinoma:  T1b N0 M0 (Stage I) infiltrating ductal carcinoma, Tumor size 0.8 mm, LN -v3, grade 3, ER 0, NC 15%, Her 2 -ve. High risk mammaprint. Review of Systems   All other systems reviewed and are negative. Physical Exam   Pulmonary/Chest: Right breast exhibits no inverted nipple, no mass, no nipple discharge, no skin change and no tenderness. Left breast exhibits no inverted nipple, no mass, no nipple discharge, no skin change and no tenderness. Breasts are symmetrical.   On palpation of the supraclavicular/lower cervical area of the neck on the left, there is a 1 cm firm lymph node. Lymphadenopathy:     She has no cervical adenopathy. She has no axillary adenopathy. Nursing note and vitals reviewed. Neck ULTRASOUND  Indication: Left neck mass  Technique: The area was scanned using a high-frequency linear-array near-field transducer  Findings: 1 cm lymph node left supraclavicular region with loss of fatty hilum  Impression: Suspicious node  Disposition: needs node biopsy/fna  ASSESSMENT and PLAN    ICD-10-CM ICD-9-CM    1. Malignant neoplasm of upper-inner quadrant of left breast in female, estrogen receptor negative (HCC) C50.212 174.2     Z17.1 V86.1    2. Supraclavicular adenopathy R59.0 785. 10        56 yo female with T1 N0 triple negative left breast cancer. Her  is on the speaker phone. As we were discussing her lumpectomy surgery she mentioned this left neck mass. On exam this is abnormal and I am very concerned for metastasis.   I explained to her and her  after speaking to Dr. Kay Franco she needs an fna of the node and a pet scan. This could change the surgical plan to systemic therapy first.   The  \"wants the breast lump out now\" but I explained if this has spread further the standard of care was chemotherapy. They knew about the neck lump when they saw Dr. Ck Javed last week but did not mention it to him. Will set up fna left neck node, pet scan.

## 2018-10-29 NOTE — PROGRESS NOTES
Cynthia Etienne is a 57 y.o. female patient here today for left breast cancer. Patient's ER -ve, OH +ve & HER2 -ve. Mammoprint high risk. Patient accompanied by spouse to today's appt. Patient works at [employer]. Non smoker. Lumpectomy scheduled for 11/8.     Visit Vitals  /68   Pulse 80   Temp 98.2 °F (36.8 °C) (Oral)   Resp 16   Ht 5' 5\" (1.651 m)   Wt 154 lb 9.6 oz (70.1 kg)   SpO2 99%   BMI 25.73 kg/m²

## 2018-10-31 NOTE — PROGRESS NOTES
Breast cancer in the supraclavicular LN. Bring her in for discussion regarding neoadjuvant chemotherapy.

## 2018-11-02 ENCOUNTER — OFFICE VISIT (OUTPATIENT)
Dept: ONCOLOGY | Age: 48
End: 2018-11-02

## 2018-11-02 VITALS
HEART RATE: 76 BPM | OXYGEN SATURATION: 96 % | TEMPERATURE: 98.4 F | BODY MASS INDEX: 25.83 KG/M2 | SYSTOLIC BLOOD PRESSURE: 105 MMHG | RESPIRATION RATE: 16 BRPM | HEIGHT: 65 IN | DIASTOLIC BLOOD PRESSURE: 69 MMHG | WEIGHT: 155 LBS

## 2018-11-02 DIAGNOSIS — Z79.899 ENCOUNTER FOR MONITORING CARDIOTOXIC DRUG THERAPY: ICD-10-CM

## 2018-11-02 DIAGNOSIS — Z17.1 MALIGNANT NEOPLASM OF UPPER-INNER QUADRANT OF LEFT BREAST IN FEMALE, ESTROGEN RECEPTOR NEGATIVE (HCC): Primary | ICD-10-CM

## 2018-11-02 DIAGNOSIS — Z51.81 ENCOUNTER FOR MONITORING CARDIOTOXIC DRUG THERAPY: ICD-10-CM

## 2018-11-02 DIAGNOSIS — C50.212 MALIGNANT NEOPLASM OF UPPER-INNER QUADRANT OF LEFT BREAST IN FEMALE, ESTROGEN RECEPTOR NEGATIVE (HCC): Primary | ICD-10-CM

## 2018-11-02 RX ORDER — LIDOCAINE AND PRILOCAINE 25; 25 MG/G; MG/G
CREAM TOPICAL AS NEEDED
Qty: 30 G | Refills: 0 | Status: SHIPPED | OUTPATIENT
Start: 2018-11-02 | End: 2019-04-25 | Stop reason: ALTCHOICE

## 2018-11-02 RX ORDER — PROCHLORPERAZINE MALEATE 10 MG
10 TABLET ORAL
Qty: 30 TAB | Refills: 1 | Status: SHIPPED | OUTPATIENT
Start: 2018-11-02 | End: 2019-04-25 | Stop reason: ALTCHOICE

## 2018-11-02 RX ORDER — ONDANSETRON 4 MG/1
4 TABLET, ORALLY DISINTEGRATING ORAL
Qty: 30 TAB | Refills: 1 | Status: SHIPPED | OUTPATIENT
Start: 2018-11-02 | End: 2018-12-26 | Stop reason: SDUPTHER

## 2018-11-02 NOTE — PROGRESS NOTES
Elis Foster a 57 y.o. female patient here today for left breast cancer. Patient's ER -ve, IN +ve & HER2 -ve. Mammoprint high risk. Non smoker. Lumpectomy scheduled for 11/8. Provider requested pt make an appt to discuss next steps d/t nodes shows TNBC. Patient here to discuss next steps. VS stable. Patient denies pain.

## 2018-11-02 NOTE — PROGRESS NOTES
[address], [address] [place] [address], [address] [facility]  [phone]      Follow-up Note        Patient: Harry Lau MRN: [id number]  SSN: [id number]    YOB: 1970  Age: 57 y.o. Sex: female      Diagnosis:     1. Left breast carcinoma:  T1b N3a M0 (Stage IIIC) infiltrating ductal carcinoma, Tumor size 0.8 mm, LN +ve, grade 3, ER 0, IA 15%, Her 2 -ve     Subjective:      Harry Lau is a 57 y.o. female with a new diagnosis of left sided invasive breast carcinoma. She underwent a screening mammogram and was noted to have a density in the upper inner quadrant of the left breast. A biopsy of the lesion showed a diagnosis of invasive breast carcinoma ER -ve IA 15% and Her 2 negative. An MRI did not reveal any additional abnormalities. She has a supraclavicular on the same side which is consistent with a diagnosis of       Review of Systems:    Constitutional: negative  Eyes: negative  Ears, Nose, Mouth, Throat, and Face: negative  Respiratory: negative  Cardiovascular: negative  Gastrointestinal: negative  Genitourinary:negative  Integument/Breast: negative  Hematologic/Lymphatic: palpable left neck mass  Musculoskeletal:negative  Neurological: negative        Past Medical History:   Diagnosis Date    Frequent headaches     Migraine     22 years     Past Surgical History:   Procedure Laterality Date    HX DILATION AND CURETTAGE      2010      Family History   Problem Relation Age of Onset    Headache Mother     Headache Maternal Aunt      Social History     Tobacco Use    Smoking status: Never Smoker    Smokeless tobacco: Never Used   Substance Use Topics    Alcohol use: No     Comment: social      Prior to Admission medications    Medication Sig Start Date End Date Taking?  Authorizing Provider   ondansetron (ZOFRAN ODT) 4 mg disintegrating tablet Take 1 Tab by mouth every eight (8) hours as needed for Nausea. 11/2/18  Yes Hanny, Raeanne Snellen H, NP   prochlorperazine (COMPAZINE) 10 mg tablet Take 1 Tab by mouth every six (6) hours as needed for up to 30 doses. 11/2/18  Yes Mel Zamora NP   lidocaine-prilocaine (EMLA) topical cream Apply  to affected area as needed for Pain. 11/2/18  Yes Gabbi YOUNG NP   MULTIVITAMIN PO Take  by mouth. Yes Provider, Historical   ergocalciferol, vitamin D2, (VITAMIN D2 PO) Take  by mouth. Yes Provider, Historical   SUMAtriptan succinate (ZEMBRACE SYMTOUCH) 3 mg/0.5 mL pnij 1 Syringe by SubCUTAneous route as needed. 1 at HA onset and repeat q 1 hour until WHELNA relieved or until used 4 in 24 hours 10/7/18  Yes Dallas Corea NP   cyclobenzaprine (FLEXERIL) 10 mg tablet TK 1 T PO TID PRN 3/13/18  Yes Provider, Historical   SUMAtriptan (IMITREX) 100 mg tablet Take 1tab at the onset of HA Repeat in 2 hours. MAX 2 tabs  in 24hrs 3/22/18  Yes Dallas Corea NP          Allergies   Allergen Reactions    Latex Itching     Skin gets very dry           Objective:     Vitals:    11/02/18 1349   BP: 105/69   Pulse: 76   Temp: 98.4 °F (36.9 °C)   TempSrc: Oral            Physical Exam:    GENERAL: alert, cooperative, no distress, appears stated age  EYE: negative  LYMPHATIC: palpable left supraclavicular/lower cervical node  THROAT & NECK: normal and no erythema or exudates noted. LUNG: clear to auscultation bilaterally  HEART: regular rate and rhythm  ABDOMEN: soft, non-tender  EXTREMITIES:  no edema  SKIN: Normal.  NEUROLOGIC: negative        Assessment:     1. Left breast carcinoma:  T1b N3a M0 (Stage IIIC) infiltrating ductal carcinoma, Tumor size 0.8 mm, LN +ve, grade 3, ER 0, SC 15%, Her 2 -ve        ECOG PS 0  Intent of Treatment - curative  Prognosis- excellent    I spent 65 minute with the patient in a face-to-face encounter. I explained her the stage of the disease, pathophysiology of the disease and the treatment approaches. I answered all her questions.  More than 50% of the time was utilized in education, counseling and co-ordination of care. I spent significant time in explaining the rationale of neoadjuvant therapy is tumor shrinkage with the hope to achieve a successful surgical treatment (with clear surgical margin) which may be a lumpectomy/mastectomy associated with axillary lymph node dissection. I explained to the patient that neoadjuvant chemotherapy has not shown to be superior to adjuvant chemotherapy in the treatment of breast cancer. However if the patient achieves a pathological complete remission (pCR), her chances for 5 year survival is excellent. A proportion of patient may be able to achieve pCR. NSABP B-27 randomized patient to a combination of AC=>T and AC. There was no difference in the DFS and OS between the two groups. However greater proportion of patient were able to achieve a pCR in the arm receiving AC=>T. We also know that patients who do achieve a pCR have a better outcome than those who don't. Thus I believe AC=>T is the treatment of choice. Dose dense chemotherapy is more toxic and has not proven to be superior to q3 weeks chemotherapy. I discussed various treatment options for neoadjuvant chemotherapy including anthracycline and non-anthracycline containing regimens. I believe a combination of Carboplatin/Paclitaxel followed by Adriamycin/Cytoxan is one of the best options to use for neoadjuvant chemotherapy. I have offered her standard neoadjuvant chemotherapy. Adriamycin 60 mg/m2 q2w X 4  Cyclophosphamide 600 mg/m2 q2w X 4    Followed by    Paclitaxel 80 mg/m2 qw X 12    I counseled the patient regarding the chemotherapy. Discussions included side-effect, toxicity, benefit and risks of chemotherapy. She understood the expected side-effect which includes alopecia, nausea, peripheral neuropathy, neutropenic fever, anemia, decreased in the LVEF, need for transfusion among other things.   After weighing the benefit and risks, she agreed to proceed with chemotherapy. I spent 65 minute with the patient in a face-to-face encounter. I explained her the stage of the disease, pathophysiology of the disease and the treatment approaches. I answered all her questions. More than 50% of the time was utilized in education, counseling and co-ordination of care. Plan:       1. Port placement by Dr. Clive Oneil  2. 2D echo ordered  3. Consent signed and scanned into EMR  4. Zofran, Emla, and compazine sent to pharmacy. 5. PET scan scheduled for next week        Signed by: Ishmael Chávez MD                     November 2, 2018        CC. Aurelia Dakin, MD  CC.  Jesenia Lopez MD

## 2018-11-02 NOTE — H&P (VIEW-ONLY)
2001 [facility] 
at [place] [address], [facility], [address] 
[phone] Follow-up Note Patient: Brian Magallanes MRN: [id number]  SSN: [id number] YOB: 1970  Age: 57 y.o. Sex: female Diagnosis: 1. Left breast carcinoma: 
T1b N3a M0 (Stage IIIC) infiltrating ductal carcinoma, Tumor size 0.8 mm, LN +ve, grade 3, ER 0, IN 15%, Her 2 -ve Subjective:  
  
Brian Magallanes is a 57 y.o. female with a new diagnosis of left sided invasive breast carcinoma. She underwent a screening mammogram and was noted to have a density in the upper inner quadrant of the left breast. A biopsy of the lesion showed a diagnosis of invasive breast carcinoma ER -ve IN 15% and Her 2 negative. An MRI did not reveal any additional abnormalities. She has a supraclavicular on the same side which is consistent with a diagnosis of  
 
Review of Systems: 
 
Constitutional: negative Eyes: negative Ears, Nose, Mouth, Throat, and Face: negative Respiratory: negative Cardiovascular: negative Gastrointestinal: negative Genitourinary:negative Integument/Breast: negative Hematologic/Lymphatic: palpable left neck mass Musculoskeletal:negative Neurological: negative Past Medical History:  
Diagnosis Date  Frequent headaches  Migraine 22 years Past Surgical History:  
Procedure Laterality Date  HX DILATION AND CURETTAGE    
 2010 Family History Problem Relation Age of Onset  Headache Mother  Headache Maternal Aunt Social History Tobacco Use  Smoking status: Never Smoker  Smokeless tobacco: Never Used Substance Use Topics  Alcohol use: No  
  Comment: social  
  
Prior to Admission medications Medication Sig Start Date End Date Taking?  Authorizing Provider  
ondansetron (ZOFRAN ODT) 4 mg disintegrating tablet Take 1 Tab by mouth every eight (8) hours as needed for Nausea. 11/2/18  Yes Pattie Zamora, JOEY  
prochlorperazine (COMPAZINE) 10 mg tablet Take 1 Tab by mouth every six (6) hours as needed for up to 30 doses. 11/2/18  Yes Mel Zamora, NP  
lidocaine-prilocaine (EMLA) topical cream Apply  to affected area as needed for Pain. 11/2/18  Yes Clay YOUNG NP  
MULTIVITAMIN PO Take  by mouth. Yes Provider, Historical  
ergocalciferol, vitamin D2, (VITAMIN D2 PO) Take  by mouth. Yes Provider, Historical  
SUMAtriptan succinate (ZEMBRACE SYMTOUCH) 3 mg/0.5 mL pnij 1 Syringe by SubCUTAneous route as needed. 1 at HA onset and repeat q 1 hour until WHELAN relieved or until used 4 in 24 hours 10/7/18  Yes Karen Drummond NP  
cyclobenzaprine (FLEXERIL) 10 mg tablet TK 1 T PO TID PRN 3/13/18  Yes Provider, Historical  
SUMAtriptan (IMITREX) 100 mg tablet Take 1tab at the onset of HA Repeat in 2 hours. MAX 2 tabs  in 24hrs 3/22/18  Yes Karen Drummond NP Allergies Allergen Reactions  Latex Itching Skin gets very dry Objective:  
 
Vitals:  
 11/02/18 1349 BP: 105/69 Pulse: 76 Temp: 98.4 °F (36.9 °C) TempSrc: Oral  
  
 
 
 
Physical Exam: 
 
GENERAL: alert, cooperative, no distress, appears stated age EYE: negative LYMPHATIC: palpable left supraclavicular/lower cervical node THROAT & NECK: normal and no erythema or exudates noted. LUNG: clear to auscultation bilaterally HEART: regular rate and rhythm ABDOMEN: soft, non-tender EXTREMITIES:  no edema SKIN: Normal. 
NEUROLOGIC: negative Assessment: 1. Left breast carcinoma: 
T1b N3a M0 (Stage IIIC) infiltrating ductal carcinoma, Tumor size 0.8 mm, LN +ve, grade 3, ER 0, MI 15%, Her 2 -ve ECOG PS 0 Intent of Treatment - curative Prognosis- excellent I spent 65 minute with the patient in a face-to-face encounter.  I explained her the stage of the disease, pathophysiology of the disease and the treatment approaches. I answered all her questions. More than 50% of the time was utilized in education, counseling and co-ordination of care. I spent significant time in explaining the rationale of neoadjuvant therapy is tumor shrinkage with the hope to achieve a successful surgical treatment (with clear surgical margin) which may be a lumpectomy/mastectomy associated with axillary lymph node dissection. I explained to the patient that neoadjuvant chemotherapy has not shown to be superior to adjuvant chemotherapy in the treatment of breast cancer. However if the patient achieves a pathological complete remission (pCR), her chances for 5 year survival is excellent. A proportion of patient may be able to achieve pCR. NSABP B-27 randomized patient to a combination of AC=>T and AC. There was no difference in the DFS and OS between the two groups. However greater proportion of patient were able to achieve a pCR in the arm receiving AC=>T. We also know that patients who do achieve a pCR have a better outcome than those who don't. Thus I believe AC=>T is the treatment of choice. Dose dense chemotherapy is more toxic and has not proven to be superior to q3 weeks chemotherapy. I discussed various treatment options for neoadjuvant chemotherapy including anthracycline and non-anthracycline containing regimens. I believe a combination of Carboplatin/Paclitaxel followed by Adriamycin/Cytoxan is one of the best options to use for neoadjuvant chemotherapy. I have offered her standard neoadjuvant chemotherapy. Adriamycin 60 mg/m2 q2w X 4 Cyclophosphamide 600 mg/m2 q2w X 4 Followed by 
 
Paclitaxel 80 mg/m2 qw X 12 I counseled the patient regarding the chemotherapy. Discussions included side-effect, toxicity, benefit and risks of chemotherapy.   She understood the expected side-effect which includes alopecia, nausea, peripheral neuropathy, neutropenic fever, anemia, decreased in the LVEF, need for transfusion among other things. After weighing the benefit and risks, she agreed to proceed with chemotherapy. I spent 65 minute with the patient in a face-to-face encounter. I explained her the stage of the disease, pathophysiology of the disease and the treatment approaches. I answered all her questions. More than 50% of the time was utilized in education, counseling and co-ordination of care. Plan: 1. Port placement by Dr. Melania James 2. 2D echo ordered 3. Consent signed and scanned into EMR 4. Zofran, Emla, and compazine sent to pharmacy. 5. PET scan scheduled for next week Signed by: Isiah Flores MD 
                   November 2, 2018 
 
 
 
CC. Jessica Elder MD 
CC.  Kelsie Riley MD

## 2018-11-06 ENCOUNTER — TELEPHONE (OUTPATIENT)
Dept: ONCOLOGY | Age: 48
End: 2018-11-06

## 2018-11-06 ENCOUNTER — HOSPITAL ENCOUNTER (OUTPATIENT)
Dept: PET IMAGING | Age: 48
End: 2018-11-06
Attending: SURGERY

## 2018-11-06 ENCOUNTER — DOCUMENTATION ONLY (OUTPATIENT)
Dept: SURGERY | Age: 48
End: 2018-11-06

## 2018-11-06 ENCOUNTER — HOSPITAL ENCOUNTER (OUTPATIENT)
Dept: ULTRASOUND IMAGING | Age: 48
Discharge: HOME | End: 2018-11-06
Attending: SURGERY

## 2018-11-06 DIAGNOSIS — C50.212 MALIGNANT NEOPLASM OF UPPER-INNER QUADRANT OF LEFT BREAST IN FEMALE, ESTROGEN RECEPTOR NEGATIVE (HCC): ICD-10-CM

## 2018-11-06 DIAGNOSIS — R59.0 SUPRACLAVICULAR ADENOPATHY: ICD-10-CM

## 2018-11-06 DIAGNOSIS — Z17.1 MALIGNANT NEOPLASM OF UPPER-INNER QUADRANT OF LEFT BREAST IN FEMALE, ESTROGEN RECEPTOR NEGATIVE (HCC): ICD-10-CM

## 2018-11-06 NOTE — PROGRESS NOTES
Patient had US of head and neck today but radiology tech and radiologist called to see if it really needed to be done. US and FNA done last week. Attempted to call Dr. Vickey Aguillon, she is in [address] [facility] [address]. Left her a message. Reviewed chart with NP, Rajendra Isaebl NP. She does not think this was needed. Advised to send patient home and will wait for Dr. Vickey Aguillon to call for further review.

## 2018-11-06 NOTE — TELEPHONE ENCOUNTER
ONCOLOGY NURSE NAVIGATOR    TC to Dr. Joaquin Sultana office,  left re: PAC placement. Message sent to Dr. Joaquin Bowens nurse on Friday. TC to Cristal Hall () re: ECHO order.      Lowell Jaffe RN

## 2018-11-07 ENCOUNTER — HOSPITAL ENCOUNTER (OUTPATIENT)
Dept: PET IMAGING | Age: 48
Discharge: HOME | End: 2018-11-07
Attending: SURGERY
Payer: COMMERCIAL

## 2018-11-07 ENCOUNTER — TELEPHONE (OUTPATIENT)
Dept: ONCOLOGY | Age: 48
End: 2018-11-07

## 2018-11-07 ENCOUNTER — ANESTHESIA EVENT (OUTPATIENT)
Dept: SURGERY | Age: 48
End: 2018-11-07
Payer: COMMERCIAL

## 2018-11-07 ENCOUNTER — DOCUMENTATION ONLY (OUTPATIENT)
Dept: ONCOLOGY | Age: 48
End: 2018-11-07

## 2018-11-07 VITALS — WEIGHT: 155 LBS | BODY MASS INDEX: 25.83 KG/M2 | HEIGHT: 65 IN

## 2018-11-07 DIAGNOSIS — C50.212 MALIGNANT NEOPLASM OF UPPER-INNER QUADRANT OF LEFT BREAST IN FEMALE, ESTROGEN RECEPTOR NEGATIVE (HCC): Primary | ICD-10-CM

## 2018-11-07 DIAGNOSIS — Z17.1 MALIGNANT NEOPLASM OF UPPER-INNER QUADRANT OF LEFT BREAST IN FEMALE, ESTROGEN RECEPTOR NEGATIVE (HCC): Primary | ICD-10-CM

## 2018-11-07 DIAGNOSIS — R59.0 SUPRACLAVICULAR ADENOPATHY: ICD-10-CM

## 2018-11-07 PROCEDURE — A9552 F18 FDG: HCPCS

## 2018-11-07 RX ORDER — SODIUM CHLORIDE 9 MG/ML
10 INJECTION INTRAMUSCULAR; INTRAVENOUS; SUBCUTANEOUS AS NEEDED
Status: CANCELLED | OUTPATIENT
Start: 2018-11-14

## 2018-11-07 RX ORDER — DOXORUBICIN HYDROCHLORIDE 2 MG/ML
60 INJECTION, SOLUTION INTRAVENOUS ONCE
Status: CANCELLED
Start: 2018-11-14 | End: 2018-11-14

## 2018-11-07 RX ORDER — HEPARIN 100 UNIT/ML
300-500 SYRINGE INTRAVENOUS AS NEEDED
Status: CANCELLED
Start: 2018-11-14

## 2018-11-07 RX ORDER — SODIUM CHLORIDE 0.9 % (FLUSH) 0.9 %
10 SYRINGE (ML) INJECTION
Status: COMPLETED | OUTPATIENT
Start: 2018-11-07 | End: 2018-11-07

## 2018-11-07 RX ORDER — PALONOSETRON 0.05 MG/ML
0.25 INJECTION, SOLUTION INTRAVENOUS ONCE
Status: CANCELLED | OUTPATIENT
Start: 2018-11-14 | End: 2018-11-14

## 2018-11-07 RX ORDER — DIPHENHYDRAMINE HYDROCHLORIDE 50 MG/ML
50 INJECTION, SOLUTION INTRAMUSCULAR; INTRAVENOUS AS NEEDED
Status: CANCELLED
Start: 2018-11-14

## 2018-11-07 RX ORDER — ACETAMINOPHEN 325 MG/1
650 TABLET ORAL AS NEEDED
Status: CANCELLED
Start: 2018-11-14

## 2018-11-07 RX ORDER — HYDROCORTISONE SODIUM SUCCINATE 100 MG/2ML
100 INJECTION, POWDER, FOR SOLUTION INTRAMUSCULAR; INTRAVENOUS AS NEEDED
Status: CANCELLED | OUTPATIENT
Start: 2018-11-14

## 2018-11-07 RX ORDER — SODIUM CHLORIDE 0.9 % (FLUSH) 0.9 %
10 SYRINGE (ML) INJECTION AS NEEDED
Status: CANCELLED
Start: 2018-11-14

## 2018-11-07 RX ORDER — EPINEPHRINE 1 MG/ML
0.3 INJECTION, SOLUTION, CONCENTRATE INTRAVENOUS AS NEEDED
Status: CANCELLED | OUTPATIENT
Start: 2018-11-14

## 2018-11-07 RX ORDER — SODIUM CHLORIDE 9 MG/ML
25 INJECTION, SOLUTION INTRAVENOUS CONTINUOUS
Status: CANCELLED | OUTPATIENT
Start: 2018-11-14 | End: 2018-11-15

## 2018-11-07 RX ORDER — ALBUTEROL SULFATE 0.83 MG/ML
2.5 SOLUTION RESPIRATORY (INHALATION) AS NEEDED
Status: CANCELLED
Start: 2018-11-14

## 2018-11-07 RX ORDER — ONDANSETRON 2 MG/ML
8 INJECTION INTRAMUSCULAR; INTRAVENOUS AS NEEDED
Status: CANCELLED | OUTPATIENT
Start: 2018-11-14

## 2018-11-07 RX ADMIN — Medication 10 ML: at 08:45

## 2018-11-07 NOTE — TELEPHONE ENCOUNTER
ONCOLOGY NURSE NAVIGATOR      Returned VM to pt re: surgery tomorrow, left VM requesting clarification of time to report. ONN spoke to ambulatory surgery, who state TC made after 1:30pm for next day surgery. Pt states she did receive a call.      Spoke w/ JAMIE this AM re: ECHO, has several appt open on Friday, pt advised to call for appt    Jon Allen RN

## 2018-11-08 ENCOUNTER — APPOINTMENT (OUTPATIENT)
Dept: GENERAL RADIOLOGY | Age: 48
End: 2018-11-08
Attending: SURGERY
Payer: COMMERCIAL

## 2018-11-08 ENCOUNTER — HOSPITAL ENCOUNTER (OUTPATIENT)
Age: 48
Setting detail: OUTPATIENT SURGERY
Discharge: HOME | End: 2018-11-08
Attending: SURGERY | Admitting: SURGERY
Payer: COMMERCIAL

## 2018-11-08 ENCOUNTER — ANESTHESIA (OUTPATIENT)
Dept: SURGERY | Age: 48
End: 2018-11-08
Payer: COMMERCIAL

## 2018-11-08 VITALS
SYSTOLIC BLOOD PRESSURE: 116 MMHG | WEIGHT: 154 LBS | RESPIRATION RATE: 20 BRPM | TEMPERATURE: 97.8 F | DIASTOLIC BLOOD PRESSURE: 74 MMHG | BODY MASS INDEX: 25.66 KG/M2 | HEIGHT: 65 IN | OXYGEN SATURATION: 100 % | HEART RATE: 79 BPM

## 2018-11-08 DIAGNOSIS — Z17.1 MALIGNANT NEOPLASM OF UPPER-INNER QUADRANT OF LEFT BREAST IN FEMALE, ESTROGEN RECEPTOR NEGATIVE (HCC): ICD-10-CM

## 2018-11-08 DIAGNOSIS — C50.212 MALIGNANT NEOPLASM OF UPPER-INNER QUADRANT OF LEFT BREAST IN FEMALE, ESTROGEN RECEPTOR NEGATIVE (HCC): ICD-10-CM

## 2018-11-08 LAB — HCG UR QL: NEGATIVE

## 2018-11-08 PROCEDURE — 77030002933 HC SUT MCRYL J&J -A: Performed by: SURGERY

## 2018-11-08 PROCEDURE — 76030000000 HC AMB SURG OR TIME 0.5 TO 1: Performed by: SURGERY

## 2018-11-08 PROCEDURE — 74011250636 HC RX REV CODE- 250/636: Performed by: SURGERY

## 2018-11-08 PROCEDURE — 71045 X-RAY EXAM CHEST 1 VIEW: CPT

## 2018-11-08 PROCEDURE — 74011250636 HC RX REV CODE- 250/636

## 2018-11-08 PROCEDURE — 77030011640 HC PAD GRND REM COVD -A: Performed by: SURGERY

## 2018-11-08 PROCEDURE — 81025 URINE PREGNANCY TEST: CPT

## 2018-11-08 PROCEDURE — 77030011267 HC ELECTRD BLD COVD -A: Performed by: SURGERY

## 2018-11-08 PROCEDURE — 76210000034 HC AMBSU PH I REC 0.5 TO 1 HR: Performed by: SURGERY

## 2018-11-08 PROCEDURE — 77030020256 HC SOL INJ NACL 0.9%  500ML: Performed by: SURGERY

## 2018-11-08 PROCEDURE — C1788 PORT, INDWELLING, IMP: HCPCS | Performed by: SURGERY

## 2018-11-08 PROCEDURE — 76000 FLUOROSCOPY <1 HR PHYS/QHP: CPT

## 2018-11-08 PROCEDURE — 76210000050 HC AMBSU PH II REC 0.5 TO 1 HR: Performed by: SURGERY

## 2018-11-08 PROCEDURE — 77030031139 HC SUT VCRL2 J&J -A: Performed by: SURGERY

## 2018-11-08 PROCEDURE — 74011000250 HC RX REV CODE- 250: Performed by: SURGERY

## 2018-11-08 PROCEDURE — 76060000061 HC AMB SURG ANES 0.5 TO 1 HR: Performed by: SURGERY

## 2018-11-08 PROCEDURE — 77030020255 HC SOL INJ LR 1000ML BG: Performed by: SURGERY

## 2018-11-08 PROCEDURE — 77030002986 HC SUT PROL J&J -A: Performed by: SURGERY

## 2018-11-08 PROCEDURE — 74011000250 HC RX REV CODE- 250

## 2018-11-08 PROCEDURE — 77030021352 HC CBL LD SYS DISP COVD -B: Performed by: SURGERY

## 2018-11-08 PROCEDURE — 77030039266 HC ADH SKN EXOFIN S2SG -A: Performed by: SURGERY

## 2018-11-08 PROCEDURE — 74011250636 HC RX REV CODE- 250/636: Performed by: ANESTHESIOLOGY

## 2018-11-08 DEVICE — SYSTEM INFUS PRT CATH 8FR L66CM INTRO 8FR CHST TI SGL LUMN: Type: IMPLANTABLE DEVICE | Site: CHEST | Status: FUNCTIONAL

## 2018-11-08 RX ORDER — SODIUM CHLORIDE 0.9 % (FLUSH) 0.9 %
5-10 SYRINGE (ML) INJECTION AS NEEDED
Status: DISCONTINUED | OUTPATIENT
Start: 2018-11-08 | End: 2018-11-08 | Stop reason: HOSPADM

## 2018-11-08 RX ORDER — LIDOCAINE HYDROCHLORIDE 10 MG/ML
0.1 INJECTION, SOLUTION EPIDURAL; INFILTRATION; INTRACAUDAL; PERINEURAL AS NEEDED
Status: DISCONTINUED | OUTPATIENT
Start: 2018-11-08 | End: 2018-11-08 | Stop reason: HOSPADM

## 2018-11-08 RX ORDER — MIDAZOLAM HYDROCHLORIDE 1 MG/ML
INJECTION, SOLUTION INTRAMUSCULAR; INTRAVENOUS AS NEEDED
Status: DISCONTINUED | OUTPATIENT
Start: 2018-11-08 | End: 2018-11-08 | Stop reason: HOSPADM

## 2018-11-08 RX ORDER — DEXMEDETOMIDINE HYDROCHLORIDE 4 UG/ML
INJECTION, SOLUTION INTRAVENOUS AS NEEDED
Status: DISCONTINUED | OUTPATIENT
Start: 2018-11-08 | End: 2018-11-08 | Stop reason: HOSPADM

## 2018-11-08 RX ORDER — SODIUM CHLORIDE, SODIUM LACTATE, POTASSIUM CHLORIDE, CALCIUM CHLORIDE 600; 310; 30; 20 MG/100ML; MG/100ML; MG/100ML; MG/100ML
25 INJECTION, SOLUTION INTRAVENOUS CONTINUOUS
Status: DISCONTINUED | OUTPATIENT
Start: 2018-11-08 | End: 2018-11-08 | Stop reason: HOSPADM

## 2018-11-08 RX ORDER — SODIUM CHLORIDE 0.9 % (FLUSH) 0.9 %
5-10 SYRINGE (ML) INJECTION EVERY 8 HOURS
Status: DISCONTINUED | OUTPATIENT
Start: 2018-11-08 | End: 2018-11-08 | Stop reason: HOSPADM

## 2018-11-08 RX ORDER — MORPHINE SULFATE 10 MG/ML
2 INJECTION, SOLUTION INTRAMUSCULAR; INTRAVENOUS
Status: DISCONTINUED | OUTPATIENT
Start: 2018-11-08 | End: 2018-11-08 | Stop reason: HOSPADM

## 2018-11-08 RX ORDER — FENTANYL CITRATE 50 UG/ML
25 INJECTION, SOLUTION INTRAMUSCULAR; INTRAVENOUS
Status: DISCONTINUED | OUTPATIENT
Start: 2018-11-08 | End: 2018-11-08 | Stop reason: HOSPADM

## 2018-11-08 RX ORDER — FENTANYL CITRATE 50 UG/ML
INJECTION, SOLUTION INTRAMUSCULAR; INTRAVENOUS AS NEEDED
Status: DISCONTINUED | OUTPATIENT
Start: 2018-11-08 | End: 2018-11-08 | Stop reason: HOSPADM

## 2018-11-08 RX ORDER — PROPOFOL 10 MG/ML
INJECTION, EMULSION INTRAVENOUS
Status: DISCONTINUED | OUTPATIENT
Start: 2018-11-08 | End: 2018-11-08 | Stop reason: HOSPADM

## 2018-11-08 RX ORDER — BUPIVACAINE HYDROCHLORIDE 2.5 MG/ML
20 INJECTION, SOLUTION EPIDURAL; INFILTRATION; INTRACAUDAL ONCE
Status: COMPLETED | OUTPATIENT
Start: 2018-11-08 | End: 2018-11-08

## 2018-11-08 RX ORDER — CEFAZOLIN SODIUM/WATER 2 G/20 ML
2 SYRINGE (ML) INTRAVENOUS
Status: COMPLETED | OUTPATIENT
Start: 2018-11-08 | End: 2018-11-08

## 2018-11-08 RX ORDER — LIDOCAINE HYDROCHLORIDE AND EPINEPHRINE 10; 10 MG/ML; UG/ML
1.5 INJECTION, SOLUTION INFILTRATION; PERINEURAL ONCE
Status: COMPLETED | OUTPATIENT
Start: 2018-11-08 | End: 2018-11-08

## 2018-11-08 RX ORDER — LIDOCAINE HYDROCHLORIDE 20 MG/ML
INJECTION, SOLUTION EPIDURAL; INFILTRATION; INTRACAUDAL; PERINEURAL AS NEEDED
Status: DISCONTINUED | OUTPATIENT
Start: 2018-11-08 | End: 2018-11-08 | Stop reason: HOSPADM

## 2018-11-08 RX ORDER — HYDROMORPHONE HYDROCHLORIDE 1 MG/ML
.2-.5 INJECTION, SOLUTION INTRAMUSCULAR; INTRAVENOUS; SUBCUTANEOUS ONCE
Status: DISCONTINUED | OUTPATIENT
Start: 2018-11-08 | End: 2018-11-08 | Stop reason: HOSPADM

## 2018-11-08 RX ORDER — DIPHENHYDRAMINE HYDROCHLORIDE 50 MG/ML
12.5 INJECTION, SOLUTION INTRAMUSCULAR; INTRAVENOUS AS NEEDED
Status: DISCONTINUED | OUTPATIENT
Start: 2018-11-08 | End: 2018-11-08 | Stop reason: HOSPADM

## 2018-11-08 RX ORDER — OXYCODONE AND ACETAMINOPHEN 5; 325 MG/1; MG/1
1 TABLET ORAL
Status: DISCONTINUED | OUTPATIENT
Start: 2018-11-08 | End: 2018-11-08 | Stop reason: HOSPADM

## 2018-11-08 RX ORDER — OXYCODONE AND ACETAMINOPHEN 5; 325 MG/1; MG/1
1 TABLET ORAL
Qty: 20 TAB | Refills: 0 | Status: SHIPPED | OUTPATIENT
Start: 2018-11-08 | End: 2019-04-25 | Stop reason: ALTCHOICE

## 2018-11-08 RX ADMIN — PROPOFOL 75 MCG/KG/MIN: 10 INJECTION, EMULSION INTRAVENOUS at 08:42

## 2018-11-08 RX ADMIN — Medication 2 G: at 08:46

## 2018-11-08 RX ADMIN — DEXMEDETOMIDINE HYDROCHLORIDE 5 MCG: 4 INJECTION, SOLUTION INTRAVENOUS at 08:47

## 2018-11-08 RX ADMIN — MIDAZOLAM HYDROCHLORIDE 2 MG: 1 INJECTION, SOLUTION INTRAMUSCULAR; INTRAVENOUS at 08:35

## 2018-11-08 RX ADMIN — SODIUM CHLORIDE, SODIUM LACTATE, POTASSIUM CHLORIDE, AND CALCIUM CHLORIDE 25 ML/HR: 600; 310; 30; 20 INJECTION, SOLUTION INTRAVENOUS at 07:42

## 2018-11-08 RX ADMIN — LIDOCAINE HYDROCHLORIDE 80 MG: 20 INJECTION, SOLUTION EPIDURAL; INFILTRATION; INTRACAUDAL; PERINEURAL at 08:41

## 2018-11-08 RX ADMIN — DEXMEDETOMIDINE HYDROCHLORIDE 5 MCG: 4 INJECTION, SOLUTION INTRAVENOUS at 08:50

## 2018-11-08 RX ADMIN — FENTANYL CITRATE 50 MCG: 50 INJECTION, SOLUTION INTRAMUSCULAR; INTRAVENOUS at 08:39

## 2018-11-08 NOTE — INTERVAL H&P NOTE
H&P Update: 
Tonia Campa was seen and examined. History and physical has been reviewed. Significant clinical changes have occurred as noted:  Pet shows increased uptake in internal mammary nodes, left supraclav nodes. No distant disease. Will place port today Signed By: Baker Meigs, MD   
 November 8, 2018 8:09 AM

## 2018-11-08 NOTE — BRIEF OP NOTE
BRIEF OPERATIVE NOTE Date of Procedure: 11/8/2018 Preoperative Diagnosis: LEFT BREAST CANCER WITH MELYSSA METASTASES Postoperative Diagnosis: SAME Procedure(s): PORTACATH INSERTION Surgeon(s) and Role: Russell Aceves MD - Primary Surgical Assistant: NONE Surgical Staff: 
Circ-1: Tawnya Cortés RN Scrub Tech-1: Krista Gamino Surg Asst-1: Nick Mclean Event Time In Time Out Incision Start 4168 Incision Close 0914 Anesthesia: MAC Estimated Blood Loss: MINIMAL Specimens: * No specimens in log * Findings: TIP OF PORT JUNCTION SVC AND ATRIUM Complications: NONE Implants:  
Implant Name Type Inv. Item Serial No.  Lot No. LRB No. Used Action PORT VASC INFUS SET 8FR TI -- SMART PORT CT - [id number]  PORT VASC INFUS SET 8FR TI -- SMART PORT CT [id number] [employer] [id number] Right 1 Implanted DICTATED [id number]

## 2018-11-08 NOTE — PERIOP NOTES
Portable chest xray taken. 1 Dr. Kerwin Leach notified that tip in surperia vena cava and no pneumothorax. Ok'd for discharge, 1035 Pt. Alert. Denies pain or chill. Discharge instructions reviewed with caregiver and patient. Allowed and answered questions. Tolerating PO fluids. Both state ready for discharge. Signature pad not working Mother Garry Asher stated understanding. 1040 Discharged to car without incident

## 2018-11-08 NOTE — ANESTHESIA POSTPROCEDURE EVALUATION
Procedure(s): PORTACATH INSERTION. Anesthesia Post Evaluation Multimodal analgesia: multimodal analgesia used between 6 hours prior to anesthesia start to PACU discharge Patient location during evaluation: bedside Patient participation: complete - patient participated Level of consciousness: awake and alert Pain score: 2 Airway patency: patent Anesthetic complications: no 
Cardiovascular status: hemodynamically stable Respiratory status: room air and spontaneous ventilation Hydration status: euvolemic Comments: Postop CXR done Post anesthesia nausea and vomiting:  none Visit Vitals /74 Pulse 79 Temp 36.6 °C (97.8 °F) Resp 20 Ht 5' 5\" (1.651 m) Wt 69.9 kg (154 lb) SpO2 100% BMI 25.63 kg/m²

## 2018-11-08 NOTE — PERIOP NOTES
Permission received to review discharge instructions and discuss private health information with mom

## 2018-11-08 NOTE — PERIOP NOTES
Javi Gene 1970 
[id number] Situation: 
Verbal report given from: RAMIN Reece RN and BLANCA Arce CRNA Procedure: Procedure(s): PORTACATH INSERTION Background: 
 
Preoperative diagnosis: LEFT BREAST CANCER Postoperative diagnosis: LEFT BREAST CANCER :  Dr. Lorena Ho Assistant(s): Circ-1: Mic Bazan RN Scrub Tech-1: Kristan Nuno Surg Asst-1: Kuldip Callaway Specimens: * No specimens in log * Assessment: 
Intra-procedure medications Anesthesia gave intra-procedure sedation and medications, see anesthesia flow sheet Intravenous fluids: Rama Reels Vital signs stable. Pt denies pain and warmer and warm blankets Recommendation: 
 
Permission to share finding with Mom : yes

## 2018-11-08 NOTE — ANESTHESIA PREPROCEDURE EVALUATION
Anesthetic History No history of anesthetic complications Review of Systems / Medical History Patient summary reviewed, nursing notes reviewed and pertinent labs reviewed Pulmonary Within defined limits Neuro/Psych Headaches (migraines) Cardiovascular Within defined limits GI/Hepatic/Renal 
Within defined limits Endo/Other Cancer (breast, left) Other Findings Physical Exam 
 
Airway Mallampati: I 
TM Distance: 4 - 6 cm Neck ROM: normal range of motion Mouth opening: Normal 
 
 Cardiovascular Rhythm: regular Rate: normal 
 
 
 
 Dental 
No notable dental hx Pulmonary Breath sounds clear to auscultation Abdominal 
GI exam deferred Other Findings Anesthetic Plan ASA: 2 Anesthesia type: MAC and general - backup Induction: Intravenous Anesthetic plan and risks discussed with: Patient

## 2018-11-08 NOTE — PERIOP NOTES
Patient: Chino Kraus MRN: [id number]  SSN: [id number] YOB: 1970  Age: 57 y.o. Sex: female Patient is status post Procedure(s): PORTACATH INSERTION. Surgeon(s) and Role: Marisela Sen MD - Primary Local/Dose/Irrigation:  SEE MAR Peripheral IV 11/08/18 Left Wrist (Active) Site Assessment Clean, dry, & intact 11/8/2018  7:41 AM  
Phlebitis Assessment 0 11/8/2018  7:41 AM  
Infiltration Assessment 0 11/8/2018  7:41 AM  
Dressing Status Clean, dry, & intact 11/8/2018  7:41 AM  
Dressing Type Tape;Transparent 11/8/2018  7:41 AM  
Hub Color/Line Status Infusing;Blue 11/8/2018  7:41 AM  
                 
 
 
 
Dressing/Packing:  Wound Chest Right-DRESSING TYPE: Topical skin adhesive/glue (11/08/18 0900)

## 2018-11-08 NOTE — DISCHARGE INSTRUCTIONS
Discharge Instructions from Dr. Inessa Parikh    ·   · You may shower, but no hot tubs, swimming pools, or baths until your incision is healed. · No heavy lifting with the affected extremity (nothing greater than 5 pounds), and limit its use for the next 4-5 days. · You may use an ice pack for comfort for the next couple of days, but do not place ice directly on the skin. Rather, use a towel or clothing to serve as a barrier between skin and ice to prevent injury. · Follow medication instructions carefully. No aspirin, ibuprofen or aleve x 1 week    · You will have bruising and swelling  · Watch for signs of infection as listed below. · Redness  · Swelling  · Drainage from the incision or from your nipple that appears infected  · Fever over 101.5 degrees for consecutive readings, or over 99.5 if you are currently undergoing chemotherapy. · Call our office (number is below) for a follow-up appointment. · If you have any problems, our phone number is [phone]    DO NOT TAKE TYLENOL/ACETAMINOPHEN WITH PERCOCET, [facility] [place], [address] TAKE NARCOTIC PAIN MEDICATIONS WITH FOOD       Narcotics tend to be constipating, we suggest taking a stool softener such as Colace or Miralax (follow package instructions). DO NOT DRIVE WHILE TAKING NARCOTIC PAIN MEDICATIONS. DO NOT TAKE SLEEPING MEDICATIONS OR ANTIANXIETY MEDICATIONS WHILE TAKING NARCOTIC PAIN MEDICATIONS,  ESPECIALLY THE NIGHT OF ANESTHESIA! CPAP PATIENTS BE SURE TO WEAR MACHINE WHENEVER NAPPING OR SLEEPING! DISCHARGE SUMMARY from Nurse    The following personal items collected during your admission are returned to you:   Dental Appliance: Dental Appliances: None  Vision: Visual Aid: None  Hearing Aid:    Jewelry: Jewelry: None  Clothing: Clothing: Other (comment)(belonging bag)  Other Valuables:  Other Valuables: None  Valuables sent to safe:        PATIENT INSTRUCTIONS:    After General Anesthesia or Intravenous Sedation, for 24 hours or while taking prescription Narcotics:        Someone should be with you for the next 24 hours. For your own safety, a responsible adult must drive you home. · Limit your activities  · Recommended activity: Rest today, up with assistance today. Do not climb stairs or shower unattended for the next 24 hours. · Please start with a soft bland diet and advance as tolerated (no nausea) to regular diet. · If you have a sore throat you should try the following: fluids, warm salt water gargles, or throat lozenges. If it does not improve after several days please follow up with your primary physician. · Do not drive and operate hazardous machinery  · Do not make important personal or business decisions  · Do  not drink alcoholic beverages  · If you have not urinated within 8 hours after discharge, please contact your surgeon on call. Report the following to your surgeon:  · Excessive pain, swelling, redness or odor of or around the surgical area  · Temperature over 100.5  · Nausea and vomiting lasting longer than 4 hours or if unable to take medications      · You will receive a Post Operative Call from one of the Recovery Room Nurses on the day after your surgery to check on you. It is very important for us to know how you are recovering after your surgery. If you have an issue or need to speak with someone, please call your surgeon, do not wait for the post operative call. · You may receive an e-mail or letter in the mail from [place] regarding your experience with us in the Ambulatory Surgery Unit. Your feedback is valuable to us and we appreciate your participation in the survey. · If the above instructions are not adequate or you are having problems after your surgery, call the physician at their office number. · We wish you a speedy recovery ? What to do at Home:      *  Please give a list of your current medications to your Primary Care Provider.     *  Please update this list whenever your medications are discontinued, doses are      changed, or new medications (including over-the-counter products) are added. *  Please carry medication information at all times in case of emergency situations. These are general instructions for a healthy lifestyle:    No smoking/ No tobacco products/ Avoid exposure to second hand smoke    Surgeon General's Warning:  Quitting smoking now greatly reduces serious risk to your health. Obesity, smoking, and sedentary lifestyle greatly increases your risk for illness    A healthy diet, regular physical exercise & weight monitoring are important for maintaining a healthy lifestyle    You may be retaining fluid if you have a history of heart failure or if you experience any of the following symptoms:  Weight gain of 3 pounds or more overnight or 5 pounds in a week, increased swelling in our hands or feet or shortness of breath while lying flat in bed. Please call your doctor as soon as you notice any of these symptoms; do not wait until your next office visit. Recognize signs and symptoms of STROKE:    B - Balance  E - Eyes    F-  Face looks uneven  A-  Arms unable to move or move even  S-  Speech slurred or non-existent  T-  Time-call 911 as soon as signs and symptoms begin-DO NOT go       Back to bed or wait to see if you get better-TIME IS BRAIN. If you have not received your influenza and/or pneumococcal vaccine, please follow up with your primary care physician. The discharge information has been reviewed with the patient and caregiver. The patient and caregiver verbalized understanding. TO PREVENT AN INFECTION      1. [address] YOUR HANDS     To prevent infection, good handwashing is the most important thing you or your caregiver can do.  Wash your hands with soap and water or use the hand  we gave you before you touch any wounds. 2. SHOWER     Use the antibacterial soap we gave you when you take a shower.       Shower with this soap until your wounds are healed.  To reach all areas of your body, you may need someone to help you.  Dont forget to clean your belly button with every shower. 3.  USE CLEAN SHEETS     Use freshly cleaned sheets on your bed after surgery.  To keep the surgery site clean, do not allow pets to sleep with you while your wound is still healing. 4. STOP SMOKING     Stop smoking, or at least cut back on smoking     Smoking slows your healing. 5.  CONTROL YOUR BLOOD SUGAR     High blood sugars slow wound healing.  If you are diabetic, control your blood sugar levels before and after your surgery.

## 2018-11-08 NOTE — OP NOTES
[facility]  OPERATIVE REPORT    Chetan Birmingham  MR#: [id number]  : 1970  ACCOUNT #: [de-identified]   DATE OF SERVICE: 2018    PREOPERATIVE DIAGNOSIS:  Left breast cancer with jordi metastases. POSTOPERATIVE DIAGNOSIS:  Left breast cancer with jordi metastases. PROCEDURE PERFORMED:  Port-A-Cath insertion, right subclavian. SURGEON:  Xander Deng MD    ASSISTANT:  None. ANESTHESIA:  MAC.    ESTIMATED BLOOD LOSS:  Minimal.    SPECIMENS REMOVED:  None. FINDINGS:  Tip of port junction of the SVC and right atrium. COMPLICATIONS:  None. IMPLANTS:  An [employer]. INDICATION FOR PROCEDURE:  This is a 82-year-old female with a left stage III triple-negative breast cancer. She initially had a T1 N0 and had palpable supraclavicular adenopathy on exam, had a needle biopsy that confirmed metastases. Therefore, she needed port placement for neoadjuvant chemotherapy. DESCRIPTION OF PROCEDURE IN DETAIL:  The patient was seen in the preop holding area where surgical site was marked by surgeon. Informed consent was obtained. She was taken to the operating room and laid in supine position where MAC anesthesia was induced. Bilateral chest and neck were prepped and draped in the usual fashion. A timeout was performed. The patient was placed in Trendelenburg position, and attention was turned to the right chest wall. Twenty mL of local anesthetic was injected in the right chest wall. An 18-gauge introducer needle was used to access the right subclavian vein via landmarks. This went very easily. The syringe was removed. The wire was threaded through the needle. The needle was removed, and the wire was going toward the SVC and right atrium on fluoroscopy. A 15 blade was used to make an incision at the wire. Bovie cautery was used to create the port pocket. Dilator sheath was threaded over the wire.   The inner cannula and wire were removed. The port catheter was clamped, threaded through the dilator sheath, and this was torn away at 16 cm of the chest wall. The tip of the catheter seen at the junction of SVC and right atrium on fluoroscopy. At this point, the catheter was clamped, cut and placed onto the port. The port was placed in the port pocket and aspirated and flushed well with injectable saline and final flush. The port was secured on either side with 3-0 Prolene. The incision was closed with interrupted 3-0 Vicryl, 4-0 subcuticular Monocryl and Dermabond. All sponge, needle and instrument counts were correct. The patient went to recovery in stable condition. A chest x-ray was ordered for recovery.       MD NAE Alas / Yossi Carvajal  D: 11/08/2018 09:26     T: 11/08/2018 09:56  JOB #: [id number]

## 2018-11-09 ENCOUNTER — HOSPITAL ENCOUNTER (OUTPATIENT)
Dept: NON INVASIVE DIAGNOSTICS | Age: 48
Discharge: HOME | End: 2018-11-09
Attending: INTERNAL MEDICINE
Payer: COMMERCIAL

## 2018-11-09 ENCOUNTER — TELEPHONE (OUTPATIENT)
Dept: ONCOLOGY | Age: 48
End: 2018-11-09

## 2018-11-09 ENCOUNTER — DOCUMENTATION ONLY (OUTPATIENT)
Dept: ONCOLOGY | Age: 48
End: 2018-11-09

## 2018-11-09 ENCOUNTER — TELEPHONE (OUTPATIENT)
Dept: SURGERY | Age: 48
End: 2018-11-09

## 2018-11-09 DIAGNOSIS — Z51.81 ENCOUNTER FOR MONITORING CARDIOTOXIC DRUG THERAPY: ICD-10-CM

## 2018-11-09 DIAGNOSIS — Z79.899 ENCOUNTER FOR MONITORING CARDIOTOXIC DRUG THERAPY: ICD-10-CM

## 2018-11-09 DIAGNOSIS — Z17.1 MALIGNANT NEOPLASM OF UPPER-INNER QUADRANT OF LEFT BREAST IN FEMALE, ESTROGEN RECEPTOR NEGATIVE (HCC): ICD-10-CM

## 2018-11-09 DIAGNOSIS — C50.212 MALIGNANT NEOPLASM OF UPPER-INNER QUADRANT OF LEFT BREAST IN FEMALE, ESTROGEN RECEPTOR NEGATIVE (HCC): ICD-10-CM

## 2018-11-09 PROCEDURE — 93306 TTE W/DOPPLER COMPLETE: CPT

## 2018-11-09 NOTE — PROGRESS NOTES
[employer]  Social Work Navigator Encounter     Patient Name:  Candy Monet. Tiarra Ibarra     Medical History: dx breast cancer    Advance Directives:    Narrative: pt left SW a msg to call her HR at [phone] and get forms needed for her to be out. Pt provided ssn [phone] but HR would not give any info but stated doctor could send a letter    To [phone]. Pt states she works for [employer]. ..[facility]    Barriers to Care:     Plan:

## 2018-11-09 NOTE — TELEPHONE ENCOUNTER
ONCOLOGY NURSE NAVIGATOR     Returned pt's call re: chemo treatment schedule. Had questions about which drugs and frequency. Advised Adriamycin/Cyclophosphamide every 2 weeks X4 and Pacliatxel every week X12 weeks. Advised to contact HR Dept to obtain [facility] paperwork, as they would not send to [address] [place] [address] MSW. States missed 11/8/18 night of work due to [facility] placement. Plans to take off 11/19-11/23 after 1st chemo treatment; will evaluate after to see how she tolerates. States her local manager needs a copy of letter for her file, which is sent to HR. Elsy Brock MSW. Appreciative of phone call and follow up. Upset w/ HR as she states they were mailing [facility] papers 3 weeks ago. She did not understand why they would not send paperwork to [facility] SW. States she will contact HR today.     Pao Garcia RN

## 2018-11-09 NOTE — TELEPHONE ENCOUNTER
The patient was called POD #1 for a dipika cath insertion. She states she has \"soreness\" but has not taken any pain medications. I instructed her she can take OTC analgesics if she wants and ice for relief. She was appreciative for the call.

## 2018-11-14 ENCOUNTER — OFFICE VISIT (OUTPATIENT)
Dept: ONCOLOGY | Age: 48
End: 2018-11-14

## 2018-11-14 ENCOUNTER — HOSPITAL ENCOUNTER (OUTPATIENT)
Dept: INFUSION THERAPY | Age: 48
Discharge: HOME | End: 2018-11-14
Payer: COMMERCIAL

## 2018-11-14 VITALS
WEIGHT: 156 LBS | DIASTOLIC BLOOD PRESSURE: 74 MMHG | TEMPERATURE: 97.8 F | HEART RATE: 94 BPM | RESPIRATION RATE: 18 BRPM | HEIGHT: 65 IN | SYSTOLIC BLOOD PRESSURE: 120 MMHG | BODY MASS INDEX: 25.99 KG/M2 | OXYGEN SATURATION: 100 %

## 2018-11-14 VITALS
TEMPERATURE: 98.8 F | OXYGEN SATURATION: 99 % | BODY MASS INDEX: 25.66 KG/M2 | WEIGHT: 154 LBS | RESPIRATION RATE: 16 BRPM | DIASTOLIC BLOOD PRESSURE: 72 MMHG | HEART RATE: 82 BPM | HEIGHT: 65 IN | SYSTOLIC BLOOD PRESSURE: 108 MMHG

## 2018-11-14 DIAGNOSIS — Z17.1 MALIGNANT NEOPLASM OF UPPER-INNER QUADRANT OF LEFT BREAST IN FEMALE, ESTROGEN RECEPTOR NEGATIVE (HCC): Primary | ICD-10-CM

## 2018-11-14 DIAGNOSIS — C50.212 MALIGNANT NEOPLASM OF UPPER-INNER QUADRANT OF LEFT BREAST IN FEMALE, ESTROGEN RECEPTOR NEGATIVE (HCC): Primary | ICD-10-CM

## 2018-11-14 LAB
ALBUMIN SERPL-MCNC: 3.3 G/DL (ref 3.5–5)
ALBUMIN/GLOB SERPL: 1.1 {RATIO} (ref 1.1–2.2)
ALP SERPL-CCNC: 69 U/L (ref 45–117)
ALT SERPL-CCNC: 47 U/L (ref 12–78)
ANION GAP SERPL CALC-SCNC: 9 MMOL/L (ref 5–15)
AST SERPL-CCNC: 27 U/L (ref 15–37)
BASOPHILS # BLD: 0 K/UL (ref 0–0.1)
BASOPHILS NFR BLD: 0 % (ref 0–1)
BILIRUB SERPL-MCNC: 0.3 MG/DL (ref 0.2–1)
BUN SERPL-MCNC: 10 MG/DL (ref 6–20)
BUN/CREAT SERPL: 19 (ref 12–20)
CALCIUM SERPL-MCNC: 8.4 MG/DL (ref 8.5–10.1)
CHLORIDE SERPL-SCNC: 109 MMOL/L (ref 97–108)
CO2 SERPL-SCNC: 23 MMOL/L (ref 21–32)
CREAT SERPL-MCNC: 0.52 MG/DL (ref 0.55–1.02)
DIFFERENTIAL METHOD BLD: ABNORMAL
EOSINOPHIL # BLD: 0.1 K/UL (ref 0–0.4)
EOSINOPHIL NFR BLD: 1 % (ref 0–7)
ERYTHROCYTE [DISTWIDTH] IN BLOOD BY AUTOMATED COUNT: 12.6 % (ref 11.5–14.5)
GLOBULIN SER CALC-MCNC: 3.1 G/DL (ref 2–4)
GLUCOSE SERPL-MCNC: 61 MG/DL (ref 65–100)
HCT VFR BLD AUTO: 37.2 % (ref 35–47)
HGB BLD-MCNC: 12.5 G/DL (ref 11.5–16)
IMM GRANULOCYTES # BLD: 0 K/UL (ref 0–0.04)
IMM GRANULOCYTES NFR BLD AUTO: 0 % (ref 0–0.5)
LYMPHOCYTES # BLD: 1.4 K/UL (ref 0.8–3.5)
LYMPHOCYTES NFR BLD: 30 % (ref 12–49)
MCH RBC QN AUTO: 31 PG (ref 26–34)
MCHC RBC AUTO-ENTMCNC: 33.6 G/DL (ref 30–36.5)
MCV RBC AUTO: 92.3 FL (ref 80–99)
MONOCYTES # BLD: 0.7 K/UL (ref 0–1)
MONOCYTES NFR BLD: 15 % (ref 5–13)
NEUTS SEG # BLD: 2.4 K/UL (ref 1.8–8)
NEUTS SEG NFR BLD: 53 % (ref 32–75)
NRBC # BLD: 0 K/UL (ref 0–0.01)
NRBC BLD-RTO: 0 PER 100 WBC
PLATELET # BLD AUTO: 149 K/UL (ref 150–400)
PMV BLD AUTO: 10.5 FL (ref 8.9–12.9)
POTASSIUM SERPL-SCNC: 4.1 MMOL/L (ref 3.5–5.1)
PROT SERPL-MCNC: 6.4 G/DL (ref 6.4–8.2)
RBC # BLD AUTO: 4.03 M/UL (ref 3.8–5.2)
SODIUM SERPL-SCNC: 141 MMOL/L (ref 136–145)
TROPONIN I SERPL-MCNC: <0.05 NG/ML
TROPONIN I SERPL-MCNC: <0.05 NG/ML
WBC # BLD AUTO: 4.6 K/UL (ref 3.6–11)

## 2018-11-14 PROCEDURE — 96411 CHEMO IV PUSH ADDL DRUG: CPT

## 2018-11-14 PROCEDURE — 96413 CHEMO IV INFUSION 1 HR: CPT

## 2018-11-14 PROCEDURE — 74011250636 HC RX REV CODE- 250/636: Performed by: INTERNAL MEDICINE

## 2018-11-14 PROCEDURE — 36415 COLL VENOUS BLD VENIPUNCTURE: CPT

## 2018-11-14 PROCEDURE — 96367 TX/PROPH/DG ADDL SEQ IV INF: CPT

## 2018-11-14 PROCEDURE — 80053 COMPREHEN METABOLIC PANEL: CPT

## 2018-11-14 PROCEDURE — 74011000258 HC RX REV CODE- 258: Performed by: INTERNAL MEDICINE

## 2018-11-14 PROCEDURE — 84484 ASSAY OF TROPONIN QUANT: CPT

## 2018-11-14 PROCEDURE — 74011250636 HC RX REV CODE- 250/636

## 2018-11-14 PROCEDURE — 77030012965 HC NDL HUBR BBMI -A

## 2018-11-14 PROCEDURE — 96375 TX/PRO/DX INJ NEW DRUG ADDON: CPT

## 2018-11-14 PROCEDURE — 85025 COMPLETE CBC W/AUTO DIFF WBC: CPT

## 2018-11-14 RX ORDER — DOXORUBICIN HYDROCHLORIDE 2 MG/ML
30 INJECTION, SOLUTION INTRAVENOUS ONCE
Status: COMPLETED | OUTPATIENT
Start: 2018-11-14 | End: 2018-11-14

## 2018-11-14 RX ORDER — HEPARIN 100 UNIT/ML
300-500 SYRINGE INTRAVENOUS AS NEEDED
Status: ACTIVE | OUTPATIENT
Start: 2018-11-14 | End: 2018-11-15

## 2018-11-14 RX ORDER — SODIUM CHLORIDE 9 MG/ML
25 INJECTION, SOLUTION INTRAVENOUS CONTINUOUS
Status: DISPENSED | OUTPATIENT
Start: 2018-11-14 | End: 2018-11-15

## 2018-11-14 RX ORDER — PALONOSETRON 0.05 MG/ML
0.25 INJECTION, SOLUTION INTRAVENOUS ONCE
Status: COMPLETED | OUTPATIENT
Start: 2018-11-14 | End: 2018-11-14

## 2018-11-14 RX ORDER — SODIUM CHLORIDE 9 MG/ML
10 INJECTION INTRAMUSCULAR; INTRAVENOUS; SUBCUTANEOUS AS NEEDED
Status: ACTIVE | OUTPATIENT
Start: 2018-11-14 | End: 2018-11-15

## 2018-11-14 RX ORDER — SODIUM CHLORIDE 0.9 % (FLUSH) 0.9 %
10 SYRINGE (ML) INJECTION AS NEEDED
Status: ACTIVE | OUTPATIENT
Start: 2018-11-14 | End: 2018-11-15

## 2018-11-14 RX ADMIN — SODIUM CHLORIDE 150 MG: 900 INJECTION, SOLUTION INTRAVENOUS at 15:30

## 2018-11-14 RX ADMIN — DOXORUBICIN HYDROCHLORIDE 54 MG: 2 INJECTION, SOLUTION INTRAVENOUS at 15:55

## 2018-11-14 RX ADMIN — SODIUM CHLORIDE, PRESERVATIVE FREE 500 UNITS: 5 INJECTION INTRAVENOUS at 16:48

## 2018-11-14 RX ADMIN — PALONOSETRON 0.25 MG: 0.05 INJECTION, SOLUTION INTRAVENOUS at 15:18

## 2018-11-14 RX ADMIN — CYCLOPHOSPHAMIDE 1080 MG: 500 INJECTION, POWDER, FOR SOLUTION INTRAVENOUS; ORAL at 16:05

## 2018-11-14 RX ADMIN — SODIUM CHLORIDE 20 ML: 9 INJECTION INTRAMUSCULAR; INTRAVENOUS; SUBCUTANEOUS at 16:48

## 2018-11-14 RX ADMIN — Medication 20 ML: at 15:14

## 2018-11-14 RX ADMIN — DOXORUBICIN HYDROCHLORIDE 54 MG: 2 INJECTION, SOLUTION INTRAVENOUS at 15:58

## 2018-11-14 RX ADMIN — SODIUM CHLORIDE 25 ML/HR: 900 INJECTION, SOLUTION INTRAVENOUS at 15:14

## 2018-11-14 RX ADMIN — DEXAMETHASONE SODIUM PHOSPHATE 12 MG: 4 INJECTION, SOLUTION INTRA-ARTICULAR; INTRALESIONAL; INTRAMUSCULAR; INTRAVENOUS; SOFT TISSUE at 15:20

## 2018-11-14 NOTE — PROGRESS NOTES
Elizabeth Terrell a 57 y.o. female patient here today for left breast cancer. Patient's ER -ve, AL +ve & HER2 -ve. Mammoprint high risk. Port placed 11/8/18. Patient starting A/C today; cycle 1 day 1. VS stable. Patient denies pain. Good appetite. Patient denies N/V/D and constipation. Patient denies numbness and tingling. Patient denies mouth ulcers. Patient denies cough. Patient denies SOB. Visit Vitals /72 (BP 1 Location: Right arm, BP Patient Position: Sitting) Pulse 82 Temp 98.8 °F (37.1 °C) (Oral) Resp 16 Ht 5' 5\" (1.651 m) Wt 154 lb (69.9 kg) LMP 10/23/2018 (Approximate) SpO2 99% BMI 25.63 kg/m² Health Maintenance Review: Patient reminded of \"due or due soon\" health maintenance. I have asked the patient to contact his/her primary care provider (PCP) for follow-up on his/her health maintenance.

## 2018-11-14 NOTE — DISCHARGE INSTRUCTIONS

## 2018-11-14 NOTE — PROGRESS NOTES
[address] [facility] 
at [place] [address], [facility], [address] 
[phone] Follow-up Note Patient: Simba Castillo MRN: [id number]  SSN: [id number] YOB: 1970  Age: 57 y.o. Sex: female Diagnosis: 1. Left breast carcinoma: 
T1b N3a M0 (Stage IIIC) infiltrating ductal carcinoma, Tumor size 0.8 mm, LN +ve, grade 3, ER 0, W[place] 15%, Her 2 -ve Treatment: 1. Neoadjuvant chemotherapy Adriamycin + Cytoxan - cycle 1 day 1 Subjective:  
  
Simba Castillo is a 57 y.o. female with a new diagnosis of left sided invasive breast carcinoma. She underwent a screening mammogram and was noted to have a density in the upper inner quadrant of the left breast. A biopsy of the lesion showed a diagnosis of invasive breast carcinoma ER -ve WY 15% and Her 2 negative. An MRI did not reveal any additional abnormalities. She has an enlarged supraclavicular LN on the same side which biopsy showed invasive breast cancer. She is here today to start neoadjuvant chemotherapy. She feels well. Review of Systems: 
 
Constitutional: negative Eyes: negative Ears, Nose, Mouth, Throat, and Face: negative Respiratory: negative Cardiovascular: negative Gastrointestinal: negative Genitourinary:negative Integument/Breast: negative Hematologic/Lymphatic: palpable left neck mass Musculoskeletal:negative Neurological: negative Past Medical History:  
Diagnosis Date  Frequent headaches  Migraine 22 years Past Surgical History:  
Procedure Laterality Date  HX DILATION AND CURETTAGE    
 2010 Family History Problem Relation Age of Onset  Headache Mother  Headache Maternal Aunt Social History Tobacco Use  Smoking status: Never Smoker  Smokeless tobacco: Never Used Substance Use Topics  Alcohol use: No  
  Comment: social  
  
Prior to Admission medications Medication Sig Start Date End Date Taking? Authorizing Provider  
oxyCODONE-acetaminophen (PERCOCET) 5-325 mg per tablet Take 1 Tab by mouth every four (4) hours as needed for Pain. Max Daily Amount: 6 Tabs. 11/8/18  Yes Maryann De La Vega MD  
ondansetron (ZOFRAN ODT) 4 mg disintegrating tablet Take 1 Tab by mouth every eight (8) hours as needed for Nausea. 11/2/18  Yes Latrell Zamora NP  
prochlorperazine (COMPAZINE) 10 mg tablet Take 1 Tab by mouth every six (6) hours as needed for up to 30 doses. 11/2/18  Yes Mel Zamora NP  
lidocaine-prilocaine (EMLA) topical cream Apply  to affected area as needed for Pain. 11/2/18  Yes Barrie YOUNG NP  
MULTIVITAMIN PO Take  by mouth. Yes Provider, Historical  
ergocalciferol, vitamin D2, (VITAMIN D2 PO) Take  by mouth. Yes Provider, Historical  
SUMAtriptan succinate (ZEMBRACE SYMTOUCH) 3 mg/0.5 mL pnij 1 Syringe by SubCUTAneous route as needed. 1 at HA onset and repeat q 1 hour until WHELAN relieved or until used 4 in 24 hours 10/7/18  Yes Carolina Ferrell NP  
cyclobenzaprine (FLEXERIL) 10 mg tablet TK 1 T PO TID PRN 3/13/18  Yes Provider, Historical  
SUMAtriptan (IMITREX) 100 mg tablet Take 1tab at the onset of HA Repeat in 2 hours. MAX 2 tabs  in 24hrs 3/22/18  Yes Carolina Ferrell NP Allergies Allergen Reactions  Latex Itching Skin gets very dry Objective:  
 
Vitals:  
 11/14/18 1408 BP: 108/72 Pulse: 82 Resp: 16 Temp: 98.8 °F (37.1 °C) TempSrc: Oral  
SpO2: 99% Weight: 154 lb (69.9 kg) Height: 5' 5\" (1.651 m) Physical Exam: 
 
GENERAL: alert, cooperative, no distress, appears stated age EYE: negative LYMPHATIC: palpable left supraclavicular/lower cervical node THROAT & NECK: normal and no erythema or exudates noted. LUNG: clear to auscultation bilaterally HEART: regular rate and rhythm ABDOMEN: soft, non-tender EXTREMITIES:  no edema SKIN: Normal. 
NEUROLOGIC: negative Lab Results Component Value Date/Time WBC 4.6 11/14/2018 01:37 PM  
 HGB 12.5 11/14/2018 01:37 PM  
 HCT 37.2 11/14/2018 01:37 PM  
 PLATELET 048 (L) 49/27/7887 01:37 PM  
 MCV 92.3 11/14/2018 01:37 PM  
 
 
Lab Results Component Value Date/Time Sodium 141 11/14/2018 01:37 PM  
 Potassium 4.1 11/14/2018 01:37 PM  
 Chloride 109 (H) 11/14/2018 01:37 PM  
 CO2 23 11/14/2018 01:37 PM  
 Anion gap 9 11/14/2018 01:37 PM  
 Glucose 61 (L) 11/14/2018 01:37 PM  
 BUN 10 11/14/2018 01:37 PM  
 Creatinine 0.52 (L) 11/14/2018 01:37 PM  
 BUN/Creatinine ratio 19 11/14/2018 01:37 PM  
 GFR est AA >60 11/14/2018 01:37 PM  
 GFR est non-AA >60 11/14/2018 01:37 PM  
 Calcium 8.4 (L) 11/14/2018 01:37 PM  
 Bilirubin, total 0.3 11/14/2018 01:37 PM  
 AST (SGOT) 27 11/14/2018 01:37 PM  
 Alk. phosphatase 69 11/14/2018 01:37 PM  
 Protein, total 6.4 11/14/2018 01:37 PM  
 Albumin 3.3 (L) 11/14/2018 01:37 PM  
 Globulin 3.1 11/14/2018 01:37 PM  
 A-G Ratio 1.1 11/14/2018 01:37 PM  
 ALT (SGPT) 47 11/14/2018 01:37 PM  
 
 
 
 
 
Assessment: 1. Left breast carcinoma: 
T1b N3a M0 (Stage IIIC) infiltrating ductal carcinoma, Tumor size 0.8 mm, LN +ve, grade 3, ER 0, GA 15%, Her 2 -ve ECOG PS 0 Intent of Treatment - curative Prognosis- excellent LVEF - 54% - 11/9/2018 PET -  11/7/2018 - mildly hypermetabolic left supraclavicular and left MICHAEL lymph nodes. Receiving neoadjuvant chemotherapy Adriamycin/Cyclophosphamide, Cycle 1 day 1 I explained the usual side effects of chemotherapy to the patient and ways to manage it. She vocalized understanding. Blood counts are acceptable. Results reviewed with the patient. Plan:  
 
 
· Continue ddAC · Antiemetics prophylaxis: Fosaprepitant, ondansetron, and dexamethasone prior to each treatment. Dexamethasone 8mg PO daily and Ondanestron 8mg PO BID at home on days 2, 3, 4 of each cycle. · PRN Antiemetics: Ondanestron, prochlorperazine, and lorazepam PRN at home · Neulasta 6mg SubQ to be given 24-72 hours after each treatment. · Return to clinic 2 weeks Signed by: Darling Calvillo MD 
                   November 15, 2018 
 
 
 
CC. Ellaree Riedel, MD 
CC.  Andrés Soni MD

## 2018-11-14 NOTE — PROGRESS NOTES
1330 Pt arrived at [facility] ambulatory and in no distress for C1 adriamycin/ cytoxan. Assessment completed, no new complaints voiced. Pt requesting to draw labs via right arm so she can use Emla cream on port while at doctor's visit. Labs obtained via right arm. Treatment side effects reviewed with pt, as well as side effect management and symptoms to report to physician    Visit Vitals  /74 (BP 1 Location: Right arm, BP Patient Position: Sitting)   Pulse 94   Temp 97.8 °F (36.6 °C)   Resp 18   Ht 5' 5\" (1.651 m)   Wt 70.8 kg (156 lb)   LMP 10/23/2018 (Approximate)   SpO2 100%   BMI 25.96 kg/m²       Medications received:  NS @ KVO  aloxi 0.25 mg IV  Dexamethasone 12 mg IV  Emend 150 mg IV over 20 min  Doxorubicin 108 mg IV push, blood return throughout  Cytoxan 1080 mg IV over 1 hour    Post-treatment troponin drawn. Port flushed with NS and heparin and needle removed. Chemo discharge form reviewed with pt, written copy given. Patient Vitals for the past 8 hrs:   Temp Pulse Resp BP SpO2   11/14/18 1430 97.8 °F (36.6 °C) 94 18 120/74 100 %         1650 Tolerated treatment well, no adverse reaction noted. D/Cd from [facility] ambulatory and in no distress accompanied by mother. Next appt tomorrow for neulasta    Recent Results (from the past 8 hour(s))   CBC WITH AUTOMATED DIFF    Collection Time: 11/14/18  1:37 PM   Result Value Ref Range    WBC 4.6 3.6 - 11.0 K/uL    RBC 4.03 3.80 - 5.20 M/uL    HGB 12.5 11.5 - 16.0 g/dL    HCT 37.2 35.0 - 47.0 %    MCV 92.3 80.0 - 99.0 FL    MCH 31.0 26.0 - 34.0 PG    MCHC 33.6 30.0 - 36.5 g/dL    RDW 12.6 11.5 - 14.5 %    PLATELET 901 (L) 513 - 400 K/uL    MPV 10.5 8.9 - 12.9 FL    NRBC 0.0 0  WBC    ABSOLUTE NRBC 0.00 0.00 - 0.01 K/uL    NEUTROPHILS 53 32 - 75 %    LYMPHOCYTES 30 12 - 49 %    MONOCYTES 15 (H) 5 - 13 %    EOSINOPHILS 1 0 - 7 %    BASOPHILS 0 0 - 1 %    IMMATURE GRANULOCYTES 0 0.0 - 0.5 %    ABS. NEUTROPHILS 2.4 1.8 - 8.0 K/UL    ABS.  LYMPHOCYTES 1.4 0.8 - 3.5 K/UL    ABS. MONOCYTES 0.7 0.0 - 1.0 K/UL    ABS. EOSINOPHILS 0.1 0.0 - 0.4 K/UL    ABS. BASOPHILS 0.0 0.0 - 0.1 K/UL    ABS. IMM. GRANS. 0.0 0.00 - 0.04 K/UL    DF AUTOMATED     METABOLIC PANEL, COMPREHENSIVE    Collection Time: 11/14/18  1:37 PM   Result Value Ref Range    Sodium 141 136 - 145 mmol/L    Potassium 4.1 3.5 - 5.1 mmol/L    Chloride 109 (H) 97 - 108 mmol/L    CO2 23 21 - 32 mmol/L    Anion gap 9 5 - 15 mmol/L    Glucose 61 (L) 65 - 100 mg/dL    BUN 10 6 - 20 MG/DL    Creatinine 0.52 (L) 0.55 - 1.02 MG/DL    BUN/Creatinine ratio 19 12 - 20      GFR est AA >60 >60 ml/min/1.73m2    GFR est non-AA >60 >60 ml/min/1.73m2    Calcium 8.4 (L) 8.5 - 10.1 MG/DL    Bilirubin, total 0.3 0.2 - 1.0 MG/DL    ALT (SGPT) 47 12 - 78 U/L    AST (SGOT) 27 15 - 37 U/L    Alk.  phosphatase 69 45 - 117 U/L    Protein, total 6.4 6.4 - 8.2 g/dL    Albumin 3.3 (L) 3.5 - 5.0 g/dL    Globulin 3.1 2.0 - 4.0 g/dL    A-G Ratio 1.1 1.1 - 2.2     TROPONIN I    Collection Time: 11/14/18  1:37 PM   Result Value Ref Range    Troponin-I, Qt. <0.05 <0.05 ng/mL

## 2018-11-14 NOTE — PROGRESS NOTES
11/14/2018      RE: Lev Stair      To Whom it May Concern: This is to certify that Lev Stair may 11/14/2018. Please feel free to contact my office if you have any questions or concerns. Thank you for your assistance in this matter.     Sincerely,      Anika Healy

## 2018-11-15 ENCOUNTER — HOSPITAL ENCOUNTER (OUTPATIENT)
Dept: INFUSION THERAPY | Age: 48
Discharge: HOME | End: 2018-11-15
Payer: COMMERCIAL

## 2018-11-15 VITALS
RESPIRATION RATE: 18 BRPM | HEART RATE: 73 BPM | SYSTOLIC BLOOD PRESSURE: 114 MMHG | DIASTOLIC BLOOD PRESSURE: 73 MMHG | OXYGEN SATURATION: 100 % | TEMPERATURE: 98.7 F

## 2018-11-15 DIAGNOSIS — C50.212 MALIGNANT NEOPLASM OF UPPER-INNER QUADRANT OF LEFT BREAST IN FEMALE, ESTROGEN RECEPTOR NEGATIVE (HCC): Primary | ICD-10-CM

## 2018-11-15 DIAGNOSIS — Z17.1 MALIGNANT NEOPLASM OF UPPER-INNER QUADRANT OF LEFT BREAST IN FEMALE, ESTROGEN RECEPTOR NEGATIVE (HCC): Primary | ICD-10-CM

## 2018-11-15 PROCEDURE — 96372 THER/PROPH/DIAG INJ SC/IM: CPT

## 2018-11-15 PROCEDURE — 74011250636 HC RX REV CODE- 250/636: Performed by: INTERNAL MEDICINE

## 2018-11-15 RX ADMIN — PEGFILGRASTIM 6 MG: 6 INJECTION SUBCUTANEOUS at 15:41

## 2018-11-15 NOTE — PROGRESS NOTES
[facility] Stay Note:  Arrived - 1530  Assessment - unchanged. Visit Vitals  /73 (BP 1 Location: Left arm, BP Patient Position: Sitting)   Pulse 73   Temp 98.7 °F (37.1 °C)   Resp 18   LMP 10/23/2018 (Approximate)   SpO2 100%       Neulasta 6mg SQ slowly in left arm.    1550 - Tolerated well. No reaction noted. Pt given handout on new medication. Pt denies any acute problems/changes. Discharged from [facility] ambulatory. No distress. Next appt: 11/28/18 @ 1300.

## 2018-11-16 ENCOUNTER — OFFICE VISIT (OUTPATIENT)
Dept: SURGERY | Age: 48
End: 2018-11-16

## 2018-11-16 VITALS
WEIGHT: 156 LBS | DIASTOLIC BLOOD PRESSURE: 66 MMHG | SYSTOLIC BLOOD PRESSURE: 117 MMHG | HEART RATE: 84 BPM | BODY MASS INDEX: 25.99 KG/M2 | HEIGHT: 65 IN

## 2018-11-16 DIAGNOSIS — C50.912 PRIMARY MALIGNANT NEOPLASM OF LEFT BREAST WITH STAGE 3 NODAL METASTASIS PER AMERICAN JOINT COMMITTEE ON CANCER 7TH EDITION (N3) (HCC): Primary | ICD-10-CM

## 2018-11-16 DIAGNOSIS — C77.9 PRIMARY MALIGNANT NEOPLASM OF LEFT BREAST WITH STAGE 3 NODAL METASTASIS PER AMERICAN JOINT COMMITTEE ON CANCER 7TH EDITION (N3) (HCC): Primary | ICD-10-CM

## 2018-11-16 NOTE — PROGRESS NOTES
HISTORY OF PRESENT ILLNESS  Luzma Key is a 57 y.o. female. HPI   ESTABLISHED patient here today for post op follow up for port-a-cath insertion on 11/8/18. She had her first round of neoadjuvant chemotherapy on 11/14/18 and tolerated this well. Denies any new breast problems at this time. 1. Left breast carcinoma:  T1b N0 M0 (Stage I) infiltrating ductal carcinoma, Tumor size 0.8 mm, LN -v3, grade 3, ER 0, [place] 15%, Her 2 -ve. High risk mammaprint. Enlarged supraclavicular LN on the same side which biopsy showed invasive breast cancer. 11/8/18- port-a-cath insertion    Review of Systems   All other systems reviewed and are negative. Physical Exam   Pulmonary/Chest:       Right port site c/d/i. Nursing note and vitals reviewed. ASSESSMENT and PLAN    ICD-10-CM ICD-9-CM    1. Primary malignant neoplasm of left breast with stage 3 jordi metastasis per American Joint Committee on Cancer 7th edition (N3) (HCC) C50.912 174.9     C77.9 196.9      - port healing well  - f/u in 6 weeks for breast us  - surgical plan the same.  Left us guided lumpectomy, sln biopsy  Recommend re-staging prior to surgery

## 2018-11-16 NOTE — PATIENT INSTRUCTIONS

## 2018-11-26 RX ORDER — HYDROCORTISONE SODIUM SUCCINATE 100 MG/2ML
100 INJECTION, POWDER, FOR SOLUTION INTRAMUSCULAR; INTRAVENOUS AS NEEDED
Status: CANCELLED | OUTPATIENT
Start: 2018-11-28

## 2018-11-26 RX ORDER — SODIUM CHLORIDE 9 MG/ML
10 INJECTION INTRAMUSCULAR; INTRAVENOUS; SUBCUTANEOUS AS NEEDED
Status: CANCELLED | OUTPATIENT
Start: 2018-11-28

## 2018-11-26 RX ORDER — DIPHENHYDRAMINE HYDROCHLORIDE 50 MG/ML
50 INJECTION, SOLUTION INTRAMUSCULAR; INTRAVENOUS AS NEEDED
Status: CANCELLED
Start: 2018-12-26

## 2018-11-26 RX ORDER — SODIUM CHLORIDE 0.9 % (FLUSH) 0.9 %
10 SYRINGE (ML) INJECTION AS NEEDED
Status: CANCELLED
Start: 2018-11-28

## 2018-11-26 RX ORDER — EPINEPHRINE 1 MG/ML
0.3 INJECTION, SOLUTION, CONCENTRATE INTRAVENOUS AS NEEDED
Status: CANCELLED | OUTPATIENT
Start: 2018-12-26

## 2018-11-26 RX ORDER — ONDANSETRON 2 MG/ML
8 INJECTION INTRAMUSCULAR; INTRAVENOUS AS NEEDED
Status: CANCELLED | OUTPATIENT
Start: 2018-11-28

## 2018-11-26 RX ORDER — ONDANSETRON 2 MG/ML
8 INJECTION INTRAMUSCULAR; INTRAVENOUS AS NEEDED
Status: CANCELLED | OUTPATIENT
Start: 2018-12-26

## 2018-11-26 RX ORDER — EPINEPHRINE 1 MG/ML
0.3 INJECTION, SOLUTION, CONCENTRATE INTRAVENOUS AS NEEDED
Status: CANCELLED | OUTPATIENT
Start: 2018-11-28

## 2018-11-26 RX ORDER — EPINEPHRINE 1 MG/ML
0.3 INJECTION, SOLUTION, CONCENTRATE INTRAVENOUS AS NEEDED
Status: CANCELLED | OUTPATIENT
Start: 2018-12-12

## 2018-11-26 RX ORDER — HYDROCORTISONE SODIUM SUCCINATE 100 MG/2ML
100 INJECTION, POWDER, FOR SOLUTION INTRAMUSCULAR; INTRAVENOUS AS NEEDED
Status: CANCELLED | OUTPATIENT
Start: 2018-12-26

## 2018-11-26 RX ORDER — SODIUM CHLORIDE 0.9 % (FLUSH) 0.9 %
10 SYRINGE (ML) INJECTION AS NEEDED
Status: CANCELLED
Start: 2018-12-26

## 2018-11-26 RX ORDER — DIPHENHYDRAMINE HYDROCHLORIDE 50 MG/ML
50 INJECTION, SOLUTION INTRAMUSCULAR; INTRAVENOUS AS NEEDED
Status: CANCELLED
Start: 2018-11-28

## 2018-11-26 RX ORDER — PALONOSETRON 0.05 MG/ML
0.25 INJECTION, SOLUTION INTRAVENOUS ONCE
Status: CANCELLED | OUTPATIENT
Start: 2018-12-26 | End: 2018-12-26

## 2018-11-26 RX ORDER — ACETAMINOPHEN 325 MG/1
650 TABLET ORAL AS NEEDED
Status: CANCELLED
Start: 2018-12-12

## 2018-11-26 RX ORDER — ACETAMINOPHEN 325 MG/1
650 TABLET ORAL AS NEEDED
Status: CANCELLED
Start: 2018-11-28

## 2018-11-26 RX ORDER — ACETAMINOPHEN 325 MG/1
650 TABLET ORAL AS NEEDED
Status: CANCELLED
Start: 2018-12-26

## 2018-11-26 RX ORDER — HEPARIN 100 UNIT/ML
300-500 SYRINGE INTRAVENOUS AS NEEDED
Status: CANCELLED
Start: 2018-12-12

## 2018-11-26 RX ORDER — SODIUM CHLORIDE 9 MG/ML
25 INJECTION, SOLUTION INTRAVENOUS CONTINUOUS
Status: CANCELLED | OUTPATIENT
Start: 2018-11-28 | End: 2018-11-29

## 2018-11-26 RX ORDER — DOXORUBICIN HYDROCHLORIDE 2 MG/ML
30 INJECTION, SOLUTION INTRAVENOUS ONCE
Status: CANCELLED
Start: 2018-11-28 | End: 2018-11-28

## 2018-11-26 RX ORDER — PALONOSETRON 0.05 MG/ML
0.25 INJECTION, SOLUTION INTRAVENOUS ONCE
Status: CANCELLED | OUTPATIENT
Start: 2018-11-28 | End: 2018-11-28

## 2018-11-26 RX ORDER — SODIUM CHLORIDE 9 MG/ML
25 INJECTION, SOLUTION INTRAVENOUS CONTINUOUS
Status: CANCELLED | OUTPATIENT
Start: 2018-12-26 | End: 2018-12-27

## 2018-11-26 RX ORDER — ALBUTEROL SULFATE 0.83 MG/ML
2.5 SOLUTION RESPIRATORY (INHALATION) AS NEEDED
Status: CANCELLED
Start: 2018-11-28

## 2018-11-26 RX ORDER — ALBUTEROL SULFATE 0.83 MG/ML
2.5 SOLUTION RESPIRATORY (INHALATION) AS NEEDED
Status: CANCELLED
Start: 2018-12-26

## 2018-11-26 RX ORDER — DOXORUBICIN HYDROCHLORIDE 2 MG/ML
30 INJECTION, SOLUTION INTRAVENOUS ONCE
Status: CANCELLED
Start: 2018-12-12 | End: 2018-12-12

## 2018-11-26 RX ORDER — DIPHENHYDRAMINE HYDROCHLORIDE 50 MG/ML
50 INJECTION, SOLUTION INTRAMUSCULAR; INTRAVENOUS AS NEEDED
Status: CANCELLED
Start: 2018-12-12

## 2018-11-26 RX ORDER — HEPARIN 100 UNIT/ML
300-500 SYRINGE INTRAVENOUS AS NEEDED
Status: CANCELLED
Start: 2018-11-28

## 2018-11-26 RX ORDER — PALONOSETRON 0.05 MG/ML
0.25 INJECTION, SOLUTION INTRAVENOUS ONCE
Status: CANCELLED | OUTPATIENT
Start: 2018-12-12 | End: 2018-12-12

## 2018-11-26 RX ORDER — HYDROCORTISONE SODIUM SUCCINATE 100 MG/2ML
100 INJECTION, POWDER, FOR SOLUTION INTRAMUSCULAR; INTRAVENOUS AS NEEDED
Status: CANCELLED | OUTPATIENT
Start: 2018-12-12

## 2018-11-26 RX ORDER — HEPARIN 100 UNIT/ML
300-500 SYRINGE INTRAVENOUS AS NEEDED
Status: CANCELLED
Start: 2018-12-26

## 2018-11-26 RX ORDER — DOXORUBICIN HYDROCHLORIDE 2 MG/ML
30 INJECTION, SOLUTION INTRAVENOUS ONCE
Status: CANCELLED
Start: 2018-12-26 | End: 2018-12-26

## 2018-11-26 RX ORDER — SODIUM CHLORIDE 9 MG/ML
25 INJECTION, SOLUTION INTRAVENOUS CONTINUOUS
Status: CANCELLED | OUTPATIENT
Start: 2018-12-12 | End: 2018-12-12

## 2018-11-26 RX ORDER — SODIUM CHLORIDE 9 MG/ML
10 INJECTION INTRAMUSCULAR; INTRAVENOUS; SUBCUTANEOUS AS NEEDED
Status: CANCELLED | OUTPATIENT
Start: 2018-12-12

## 2018-11-26 RX ORDER — SODIUM CHLORIDE 9 MG/ML
10 INJECTION INTRAMUSCULAR; INTRAVENOUS; SUBCUTANEOUS AS NEEDED
Status: CANCELLED | OUTPATIENT
Start: 2018-12-26

## 2018-11-26 RX ORDER — ALBUTEROL SULFATE 0.83 MG/ML
2.5 SOLUTION RESPIRATORY (INHALATION) AS NEEDED
Status: CANCELLED
Start: 2018-12-12

## 2018-11-26 RX ORDER — SODIUM CHLORIDE 0.9 % (FLUSH) 0.9 %
10 SYRINGE (ML) INJECTION AS NEEDED
Status: CANCELLED
Start: 2018-12-12

## 2018-11-26 RX ORDER — ONDANSETRON 2 MG/ML
8 INJECTION INTRAMUSCULAR; INTRAVENOUS AS NEEDED
Status: CANCELLED | OUTPATIENT
Start: 2018-12-12

## 2018-11-27 ENCOUNTER — TELEPHONE (OUTPATIENT)
Dept: ONCOLOGY | Age: 48
End: 2018-11-27

## 2018-11-27 NOTE — TELEPHONE ENCOUNTER
Spoke to Pt, she reports some cold symptoms, no fever, just sore throat and some congestion. She has treatment tomorrow and just wanted to be sure it was ok to go to work. Pt instructed ok to go to work, ok to take OTC remedies such as tylenol, robitussin, sudafed and keep an eye out for any fevers.

## 2018-11-27 NOTE — TELEPHONE ENCOUNTER
Patient called to ask if she should go to work. She went to work night before last and got sick. She has a cough, sore throat. No fever and her appetite is fine. She is wondering if her immune system is too low for treatment. She has a chemo treatment tomorrow. She is also questioning whether she should have an antibiotic.   # [phone]

## 2018-11-28 ENCOUNTER — HOSPITAL ENCOUNTER (OUTPATIENT)
Dept: INFUSION THERAPY | Age: 48
Discharge: HOME | End: 2018-11-28
Payer: COMMERCIAL

## 2018-11-28 ENCOUNTER — OFFICE VISIT (OUTPATIENT)
Dept: ONCOLOGY | Age: 48
End: 2018-11-28

## 2018-11-28 VITALS
WEIGHT: 156.3 LBS | TEMPERATURE: 97.7 F | OXYGEN SATURATION: 98 % | DIASTOLIC BLOOD PRESSURE: 76 MMHG | SYSTOLIC BLOOD PRESSURE: 111 MMHG | BODY MASS INDEX: 26.04 KG/M2 | HEART RATE: 92 BPM | RESPIRATION RATE: 16 BRPM | HEIGHT: 65 IN

## 2018-11-28 VITALS
HEART RATE: 91 BPM | RESPIRATION RATE: 18 BRPM | DIASTOLIC BLOOD PRESSURE: 71 MMHG | OXYGEN SATURATION: 100 % | SYSTOLIC BLOOD PRESSURE: 113 MMHG | BODY MASS INDEX: 26.04 KG/M2 | HEIGHT: 65 IN | WEIGHT: 156.3 LBS | TEMPERATURE: 97.8 F

## 2018-11-28 DIAGNOSIS — Z17.1 MALIGNANT NEOPLASM OF UPPER-INNER QUADRANT OF LEFT BREAST IN FEMALE, ESTROGEN RECEPTOR NEGATIVE (HCC): Primary | ICD-10-CM

## 2018-11-28 DIAGNOSIS — C50.212 MALIGNANT NEOPLASM OF UPPER-INNER QUADRANT OF LEFT BREAST IN FEMALE, ESTROGEN RECEPTOR NEGATIVE (HCC): Primary | ICD-10-CM

## 2018-11-28 LAB
BASO+EOS+MONOS # BLD AUTO: 0.8 K/UL (ref 0.2–1.2)
BASO+EOS+MONOS # BLD AUTO: 11 % (ref 3.2–16.9)
DIFFERENTIAL METHOD BLD: NORMAL
ERYTHROCYTE [DISTWIDTH] IN BLOOD BY AUTOMATED COUNT: 12.4 % (ref 11.8–15.8)
HCT VFR BLD AUTO: 35.8 % (ref 35–47)
HGB BLD-MCNC: 11.8 G/DL (ref 11.5–16)
LYMPHOCYTES # BLD: 1.4 K/UL (ref 0.8–3.5)
LYMPHOCYTES NFR BLD: 18 % (ref 12–49)
MCH RBC QN AUTO: 29.6 PG (ref 26–34)
MCHC RBC AUTO-ENTMCNC: 33 G/DL (ref 30–36.5)
MCV RBC AUTO: 89.9 FL (ref 80–99)
NEUTS SEG # BLD: 5.4 K/UL (ref 1.8–8)
NEUTS SEG NFR BLD: 71 % (ref 32–75)
PLATELET # BLD AUTO: 202 K/UL (ref 150–400)
RBC # BLD AUTO: 3.98 M/UL (ref 3.8–5.2)
TROPONIN I SERPL-MCNC: <0.05 NG/ML
WBC # BLD AUTO: 7.6 K/UL (ref 3.6–11)

## 2018-11-28 PROCEDURE — 74011000258 HC RX REV CODE- 258: Performed by: INTERNAL MEDICINE

## 2018-11-28 PROCEDURE — 74011250636 HC RX REV CODE- 250/636

## 2018-11-28 PROCEDURE — 74011000250 HC RX REV CODE- 250: Performed by: INTERNAL MEDICINE

## 2018-11-28 PROCEDURE — 96413 CHEMO IV INFUSION 1 HR: CPT

## 2018-11-28 PROCEDURE — 96411 CHEMO IV PUSH ADDL DRUG: CPT

## 2018-11-28 PROCEDURE — 85025 COMPLETE CBC W/AUTO DIFF WBC: CPT

## 2018-11-28 PROCEDURE — 36415 COLL VENOUS BLD VENIPUNCTURE: CPT

## 2018-11-28 PROCEDURE — 77030012965 HC NDL HUBR BBMI -A

## 2018-11-28 PROCEDURE — 74011250636 HC RX REV CODE- 250/636: Performed by: INTERNAL MEDICINE

## 2018-11-28 PROCEDURE — 84484 ASSAY OF TROPONIN QUANT: CPT

## 2018-11-28 PROCEDURE — 96367 TX/PROPH/DG ADDL SEQ IV INF: CPT

## 2018-11-28 PROCEDURE — 96375 TX/PRO/DX INJ NEW DRUG ADDON: CPT

## 2018-11-28 RX ORDER — DOXORUBICIN HYDROCHLORIDE 2 MG/ML
30 INJECTION, SOLUTION INTRAVENOUS ONCE
Status: COMPLETED | OUTPATIENT
Start: 2018-11-28 | End: 2018-11-28

## 2018-11-28 RX ORDER — PALONOSETRON 0.05 MG/ML
0.25 INJECTION, SOLUTION INTRAVENOUS ONCE
Status: COMPLETED | OUTPATIENT
Start: 2018-11-28 | End: 2018-11-28

## 2018-11-28 RX ORDER — SODIUM CHLORIDE 0.9 % (FLUSH) 0.9 %
10 SYRINGE (ML) INJECTION AS NEEDED
Status: ACTIVE | OUTPATIENT
Start: 2018-11-28 | End: 2018-11-29

## 2018-11-28 RX ORDER — HEPARIN 100 UNIT/ML
300-500 SYRINGE INTRAVENOUS AS NEEDED
Status: ACTIVE | OUTPATIENT
Start: 2018-11-28 | End: 2018-11-29

## 2018-11-28 RX ORDER — SODIUM CHLORIDE 9 MG/ML
10 INJECTION INTRAMUSCULAR; INTRAVENOUS; SUBCUTANEOUS AS NEEDED
Status: DISPENSED | OUTPATIENT
Start: 2018-11-28 | End: 2018-11-29

## 2018-11-28 RX ORDER — SODIUM CHLORIDE 9 MG/ML
25 INJECTION, SOLUTION INTRAVENOUS CONTINUOUS
Status: DISPENSED | OUTPATIENT
Start: 2018-11-28 | End: 2018-11-29

## 2018-11-28 RX ADMIN — CYCLOPHOSPHAMIDE 1080 MG: 500 INJECTION, POWDER, FOR SOLUTION INTRAVENOUS; ORAL at 15:55

## 2018-11-28 RX ADMIN — DOXORUBICIN HYDROCHLORIDE 54 MG: 2 INJECTION, SOLUTION INTRAVENOUS at 15:45

## 2018-11-28 RX ADMIN — DEXAMETHASONE SODIUM PHOSPHATE 12 MG: 4 INJECTION, SOLUTION INTRA-ARTICULAR; INTRALESIONAL; INTRAMUSCULAR; INTRAVENOUS; SOFT TISSUE at 14:55

## 2018-11-28 RX ADMIN — PALONOSETRON HYDROCHLORIDE 0.25 MG: 0.25 INJECTION INTRAVENOUS at 14:35

## 2018-11-28 RX ADMIN — Medication 10 ML: at 16:40

## 2018-11-28 RX ADMIN — SODIUM CHLORIDE 25 ML/HR: 900 INJECTION, SOLUTION INTRAVENOUS at 14:35

## 2018-11-28 RX ADMIN — SODIUM CHLORIDE 150 MG: 900 INJECTION, SOLUTION INTRAVENOUS at 15:05

## 2018-11-28 RX ADMIN — SODIUM CHLORIDE 10 ML: 9 INJECTION INTRAMUSCULAR; INTRAVENOUS; SUBCUTANEOUS at 13:35

## 2018-11-28 RX ADMIN — Medication 10 ML: at 13:35

## 2018-11-28 RX ADMIN — Medication 500 UNITS: at 16:40

## 2018-11-28 NOTE — PROGRESS NOTES
Evangelina Liomn a 57 y.o. female patient here today for left breast cancer. Patient's ER -ve, DC +ve & HER2 -ve. Mammoprint high risk. Port placed 11/8/18. Patient starting A/C today; cycle 2; day 1. VS stable. Patient denies pain. Good appetite. Patient denies N/V/D and constipation. Patient denies numbness and tingling. Patient denies mouth ulcers. Patient denies cough. Patient denies SOB. Patient reports increased fatigue. Visit Vitals  /76 (BP 1 Location: Right arm, BP Patient Position: Sitting)   Pulse 92   Temp 97.7 °F (36.5 °C) (Oral)   Resp 16   Ht 5' 5\" (1.651 m)   Wt 156 lb 4.8 oz (70.9 kg)   SpO2 98%   BMI 26.01 kg/m²     1. Have you been to the ER, urgent care clinic since your last visit? No.  Hospitalized since your last visit? No    2. Have you seen or consulted any other health care providers outside of the [address] [place] [address] since your last visit? Include any pap smears or colon screening. No     Health Maintenance Review: Patient reminded of \"due or due soon\" health maintenance. I have asked the patient to contact his/her primary care provider (PCP) for follow-up on his/her health maintenance.

## 2018-11-28 NOTE — PROGRESS NOTES
[facility] - Chemotherapy Progress Note    8771- Pt admit to [facility] for C2D1 A/C ambulatory in stable condition. Assessment completed. No new concerns voiced. Port accessed with positive blood return. Labs drawn per order and sent. Line flushed, clamped, Curos Cap applied to end clave. Pt over to MD office for appt. Patient Vitals for the past 12 hrs:   Temp Pulse Resp BP SpO2   11/28/18 1643 -- 91 -- 113/71 --   11/28/18 1327 97.8 °F (36.6 °C) 92 18 102/70 100 %     Recent Results (from the past 12 hour(s))   CBC WITH 3 PART DIFF    Collection Time: 11/28/18  1:29 PM   Result Value Ref Range    WBC 7.6 3.6 - 11.0 K/uL    RBC 3.98 3.80 - 5.20 M/uL    HGB 11.8 11.5 - 16.0 g/dL    HCT 35.8 35.0 - 47.0 %    MCV 89.9 80.0 - 99.0 FL    MCH 29.6 26.0 - 34.0 PG    MCHC 33.0 30.0 - 36.5 g/dL    RDW 12.4 11.8 - 15.8 %    PLATELET 944 957 - 214 K/uL    NEUTROPHILS 71 32 - 75 %    MIXED CELLS 11 3.2 - 16.9 %    LYMPHOCYTES 18 12 - 49 %    ABS. NEUTROPHILS 5.4 1.8 - 8.0 K/UL    ABS. MIXED CELLS 0.8 0.2 - 1.2 K/uL    ABS. LYMPHOCYTES 1.4 0.8 - 3.5 K/UL    DF AUTOMATED     TROPONIN I    Collection Time: 11/28/18  4:29 PM   Result Value Ref Range    Troponin-I, Qt. <0.05 <0.05 ng/mL     Medications:  NS KVO  Aloxi IVP  Decadron IV  Emend IV  Doxorubicin IVP  Cytoxan IV    1645- Pt tolerated treatment well. Port maintained positive blood return throughout treatment, flushed with positive blood return at conclusion, and de-accessed. D/c home ambulatory in no distress.  Pt aware of next [facility] appointment scheduled for 11/29 at 3:30 PM.

## 2018-11-28 NOTE — PROGRESS NOTES
[address] November 28, 2018       RE: Abdias Thapa      To Whom It May Concern,    This is to excuse Abdias Thapa from work on 11/28/18. Abdias Thapa may return to work on 11/29/18. Please feel free to contact my office if you have any questions or concerns. Thank you for your assistance in this matter.       Sincerely,  Florinda Roberts RN

## 2018-11-28 NOTE — PROGRESS NOTES
[address], [address] [place] [address] Be De La Rosa, 200 [facility]  [phone]      Follow-up Note        Patient: Alysha Dc MRN: [id number]  SSN: [id number]    YOB: 1970  Age: 57 y.o. Sex: female      Diagnosis:     1. Left breast carcinoma:  T1b N3a M0 (Stage IIIC) infiltrating ductal carcinoma, Tumor size 0.8 mm, LN +ve, grade 3, ER 0, NJ 15%, Her 2 -ve       Treatment:     1. Neoadjuvant chemotherapy   Adriamycin + Cytoxan - cycle 2 day 1    Subjective:      Alysha Dc is a 57 y.o. female with a new diagnosis of left sided invasive breast carcinoma. She underwent a screening mammogram and was noted to have a density in the upper inner quadrant of the left breast. A biopsy of the lesion showed a diagnosis of invasive breast carcinoma ER -ve NJ 15% and Her 2 negative. An MRI did not reveal any additional abnormalities. She has an enlarged supraclavicular LN on the same side which biopsy showed invasive breast cancer. She received her first neoadjuvant chemotherapy treatment and had minimal side effects. She had some nausea but has no issues with appetite.         Review of Systems:    Constitutional: negative  Eyes: negative  Ears, Nose, Mouth, Throat, and Face: negative  Respiratory: negative  Cardiovascular: negative  Gastrointestinal: negative  Genitourinary:negative  Integument/Breast: negative  Hematologic/Lymphatic: palpable left neck mass  Musculoskeletal:negative  Neurological: negative        Past Medical History:   Diagnosis Date    Frequent headaches     Migraine     22 years     Past Surgical History:   Procedure Laterality Date    HX DILATION AND CURETTAGE      2010      Family History   Problem Relation Age of Onset    Headache Mother     Headache Maternal Aunt      Social History     Tobacco Use    Smoking status: Never Smoker    Smokeless tobacco: Never Used   Substance Use Topics    Alcohol use: No     Comment: social      Prior to Admission medications    Medication Sig Start Date End Date Taking? Authorizing Provider   oxyCODONE-acetaminophen (PERCOCET) 5-325 mg per tablet Take 1 Tab by mouth every four (4) hours as needed for Pain. Max Daily Amount: 6 Tabs. 11/8/18  Yes Maryann De La Vega MD   ondansetron (ZOFRAN ODT) 4 mg disintegrating tablet Take 1 Tab by mouth every eight (8) hours as needed for Nausea. 11/2/18  Yes Nicole Zamora NP   prochlorperazine (COMPAZINE) 10 mg tablet Take 1 Tab by mouth every six (6) hours as needed for up to 30 doses. 11/2/18  Yes Mel Zamora NP   lidocaine-prilocaine (EMLA) topical cream Apply  to affected area as needed for Pain. 11/2/18  Yes Kevin YOUNG NP   MULTIVITAMIN PO Take  by mouth. Yes Provider, Historical   ergocalciferol, vitamin D2, (VITAMIN D2 PO) Take  by mouth. Yes Provider, Historical   SUMAtriptan succinate (ZEMBRACE SYMTOUCH) 3 mg/0.5 mL pnij 1 Syringe by SubCUTAneous route as needed. 1 at HA onset and repeat q 1 hour until WHELAN relieved or until used 4 in 24 hours 10/7/18  Yes Charlene Giron NP   cyclobenzaprine (FLEXERIL) 10 mg tablet TK 1 T PO TID PRN 3/13/18  Yes Provider, Historical   SUMAtriptan (IMITREX) 100 mg tablet Take 1tab at the onset of HA Repeat in 2 hours. MAX 2 tabs  in 24hrs 3/22/18  Yes Charlene Giron NP          Allergies   Allergen Reactions    Latex Itching     Skin gets very dry         Objective:     Vitals:    11/28/18 1403   BP: 111/76   Pulse: 92   Resp: 16   Temp: 97.7 °F (36.5 °C)   TempSrc: Oral   SpO2: 98%   Weight: 156 lb 4.8 oz (70.9 kg)   Height: 5' 5\" (1.651 m)          Physical Exam:    GENERAL: alert, cooperative, no distress, appears stated age  EYE: negative  LYMPHATIC: palpable left supraclavicular/lower cervical node  THROAT & NECK: normal and no erythema or exudates noted.    LUNG: clear to auscultation bilaterally  HEART: regular rate and rhythm  ABDOMEN: soft, non-tender  EXTREMITIES:  no edema  SKIN: Normal.  NEUROLOGIC: negative      Lab Results   Component Value Date/Time    WBC 7.6 11/28/2018 01:29 PM    HGB 11.8 11/28/2018 01:29 PM    HCT 35.8 11/28/2018 01:29 PM    PLATELET 427 20/20/6041 01:29 PM    MCV 89.9 11/28/2018 01:29 PM       Lab Results   Component Value Date/Time    Sodium 141 11/14/2018 01:37 PM    Potassium 4.1 11/14/2018 01:37 PM    Chloride 109 (H) 11/14/2018 01:37 PM    CO2 23 11/14/2018 01:37 PM    Anion gap 9 11/14/2018 01:37 PM    Glucose 61 (L) 11/14/2018 01:37 PM    BUN 10 11/14/2018 01:37 PM    Creatinine 0.52 (L) 11/14/2018 01:37 PM    BUN/Creatinine ratio 19 11/14/2018 01:37 PM    GFR est AA >60 11/14/2018 01:37 PM    GFR est non-AA >60 11/14/2018 01:37 PM    Calcium 8.4 (L) 11/14/2018 01:37 PM    Bilirubin, total 0.3 11/14/2018 01:37 PM    AST (SGOT) 27 11/14/2018 01:37 PM    Alk. phosphatase 69 11/14/2018 01:37 PM    Protein, total 6.4 11/14/2018 01:37 PM    Albumin 3.3 (L) 11/14/2018 01:37 PM    Globulin 3.1 11/14/2018 01:37 PM    A-G Ratio 1.1 11/14/2018 01:37 PM    ALT (SGPT) 47 11/14/2018 01:37 PM             Assessment:     1. Left breast carcinoma:  T1b N3a M0 (Stage IIIC) infiltrating ductal carcinoma, Tumor size 0.8 mm, LN +ve, grade 3, ER 0, [place] 15%, Her 2 -ve      ECOG PS 0  Intent of Treatment - curative  Prognosis- excellent    LVEF - 54% - 11/9/2018    PET -  11/7/2018 - mildly hypermetabolic left supraclavicular and left MICHAEL lymph nodes. Receiving neoadjuvant chemotherapy   Adriamycin/Cyclophosphamide, Cycle 2 day 1    Tolerating treatment   A detailed system by system evaluation of side effect was performed to assess chemotherapy related toxicity. Blood counts are acceptable. Results reviewed with the patient. Plan:       · Continue ddAC  · Return to clinic 2 weeks      Signed by: Marisel Branch MD                     November 28, 2018        CC. Narcisa Miller MD  CC.  Tarik Ku MD

## 2018-11-29 ENCOUNTER — HOSPITAL ENCOUNTER (OUTPATIENT)
Dept: INFUSION THERAPY | Age: 48
Discharge: HOME | End: 2018-11-29
Payer: COMMERCIAL

## 2018-11-29 VITALS
OXYGEN SATURATION: 100 % | TEMPERATURE: 97.8 F | RESPIRATION RATE: 18 BRPM | SYSTOLIC BLOOD PRESSURE: 111 MMHG | HEART RATE: 82 BPM | DIASTOLIC BLOOD PRESSURE: 68 MMHG

## 2018-11-29 DIAGNOSIS — Z17.1 MALIGNANT NEOPLASM OF UPPER-INNER QUADRANT OF LEFT BREAST IN FEMALE, ESTROGEN RECEPTOR NEGATIVE (HCC): Primary | ICD-10-CM

## 2018-11-29 DIAGNOSIS — C50.212 MALIGNANT NEOPLASM OF UPPER-INNER QUADRANT OF LEFT BREAST IN FEMALE, ESTROGEN RECEPTOR NEGATIVE (HCC): Primary | ICD-10-CM

## 2018-11-29 PROCEDURE — 74011250636 HC RX REV CODE- 250/636: Performed by: INTERNAL MEDICINE

## 2018-11-29 PROCEDURE — 96372 THER/PROPH/DIAG INJ SC/IM: CPT

## 2018-11-29 RX ADMIN — PEGFILGRASTIM 6 MG: 6 INJECTION SUBCUTANEOUS at 15:39

## 2018-11-29 NOTE — PROGRESS NOTES
8000 [facility] Stay Note:  Arrived - 1535    Visit Vitals  /68 (BP 1 Location: Left arm, BP Patient Position: Sitting)   Pulse 82   Temp 97.8 °F (36.6 °C)   Resp 18   SpO2 100%     Assessment - unchanged. Neulasta 6mg SQ slowly in L arm.  1545 - Tolerated well. No reaction noted. Pt denies any acute problems/changes. Discharged from [facility] ambulatory. No distress.  Next appt: 12/12/18 at 1100

## 2018-12-06 ENCOUNTER — TELEPHONE (OUTPATIENT)
Dept: ONCOLOGY | Age: 48
End: 2018-12-06

## 2018-12-06 NOTE — TELEPHONE ENCOUNTER
Patient and  called requesting for a copy of her treatment schedule. Patient says she takes a combination of adriamycie and cytoxan for 1st treatment (once per week for 12 weeks) and taxol for second round of treatment (once every two weeks). She is about to enter her third session. Her  needs something in writing that outlines her treatment schedule, so he may have it for work. He would like it today if possible. Patient requests we email it to her at [email or web address] and to fax to [phone] att: Aravind Agee

## 2018-12-07 ENCOUNTER — DOCUMENTATION ONLY (OUTPATIENT)
Dept: ONCOLOGY | Age: 48
End: 2018-12-07

## 2018-12-07 NOTE — PROGRESS NOTES
[address]  Social Work Navigator Encounter     Patient Name:  Kendra Paul     Medical History: dx breast cancer    Advance Directives:    Narrative: SW completed letter for pt spouse for missing work from Oct 29- Dec 9th. Completed fmla and faxed.            Barriers to Care:     Plan:

## 2018-12-12 ENCOUNTER — OFFICE VISIT (OUTPATIENT)
Dept: ONCOLOGY | Age: 48
End: 2018-12-12

## 2018-12-12 ENCOUNTER — HOSPITAL ENCOUNTER (OUTPATIENT)
Dept: INFUSION THERAPY | Age: 48
Discharge: HOME | End: 2018-12-12
Payer: COMMERCIAL

## 2018-12-12 VITALS
BODY MASS INDEX: 25.54 KG/M2 | SYSTOLIC BLOOD PRESSURE: 105 MMHG | HEIGHT: 65 IN | TEMPERATURE: 97.5 F | DIASTOLIC BLOOD PRESSURE: 65 MMHG | RESPIRATION RATE: 18 BRPM | HEART RATE: 77 BPM | WEIGHT: 153.3 LBS | OXYGEN SATURATION: 100 %

## 2018-12-12 VITALS
TEMPERATURE: 97.8 F | SYSTOLIC BLOOD PRESSURE: 105 MMHG | RESPIRATION RATE: 16 BRPM | HEIGHT: 65 IN | OXYGEN SATURATION: 100 % | BODY MASS INDEX: 25.54 KG/M2 | WEIGHT: 153.3 LBS | HEART RATE: 81 BPM | DIASTOLIC BLOOD PRESSURE: 65 MMHG

## 2018-12-12 DIAGNOSIS — C50.212 MALIGNANT NEOPLASM OF UPPER-INNER QUADRANT OF LEFT BREAST IN FEMALE, ESTROGEN RECEPTOR NEGATIVE (HCC): Primary | ICD-10-CM

## 2018-12-12 DIAGNOSIS — D64.81 ANEMIA ASSOCIATED WITH CHEMOTHERAPY: ICD-10-CM

## 2018-12-12 DIAGNOSIS — Z17.1 MALIGNANT NEOPLASM OF UPPER-INNER QUADRANT OF LEFT BREAST IN FEMALE, ESTROGEN RECEPTOR NEGATIVE (HCC): Primary | ICD-10-CM

## 2018-12-12 DIAGNOSIS — T45.1X5A ANEMIA ASSOCIATED WITH CHEMOTHERAPY: ICD-10-CM

## 2018-12-12 LAB
ALBUMIN SERPL-MCNC: 3.2 G/DL (ref 3.5–5)
ALBUMIN/GLOB SERPL: 0.9 {RATIO} (ref 1.1–2.2)
ALP SERPL-CCNC: 131 U/L (ref 45–117)
ALT SERPL-CCNC: 49 U/L (ref 12–78)
ANION GAP SERPL CALC-SCNC: 5 MMOL/L (ref 5–15)
AST SERPL-CCNC: 19 U/L (ref 15–37)
BASO+EOS+MONOS # BLD AUTO: 1.3 K/UL (ref 0.2–1.2)
BASO+EOS+MONOS # BLD AUTO: 17 % (ref 3.2–16.9)
BILIRUB SERPL-MCNC: 0.1 MG/DL (ref 0.2–1)
BUN SERPL-MCNC: 10 MG/DL (ref 6–20)
BUN/CREAT SERPL: 20 (ref 12–20)
CALCIUM SERPL-MCNC: 8.2 MG/DL (ref 8.5–10.1)
CHLORIDE SERPL-SCNC: 108 MMOL/L (ref 97–108)
CO2 SERPL-SCNC: 25 MMOL/L (ref 21–32)
CREAT SERPL-MCNC: 0.49 MG/DL (ref 0.55–1.02)
DIFFERENTIAL METHOD BLD: ABNORMAL
ERYTHROCYTE [DISTWIDTH] IN BLOOD BY AUTOMATED COUNT: 12.2 % (ref 11.8–15.8)
GLOBULIN SER CALC-MCNC: 3.4 G/DL (ref 2–4)
GLUCOSE SERPL-MCNC: 91 MG/DL (ref 65–100)
HCT VFR BLD AUTO: 30.6 % (ref 35–47)
HGB BLD-MCNC: 10.3 G/DL (ref 11.5–16)
LYMPHOCYTES # BLD: 1 K/UL (ref 0.8–3.5)
LYMPHOCYTES NFR BLD: 13 % (ref 12–49)
MCH RBC QN AUTO: 29.8 PG (ref 26–34)
MCHC RBC AUTO-ENTMCNC: 33.7 G/DL (ref 30–36.5)
MCV RBC AUTO: 88.4 FL (ref 80–99)
NEUTS SEG # BLD: 5.3 K/UL (ref 1.8–8)
NEUTS SEG NFR BLD: 70 % (ref 32–75)
PLATELET # BLD AUTO: 159 K/UL (ref 150–400)
POTASSIUM SERPL-SCNC: 3.7 MMOL/L (ref 3.5–5.1)
PROT SERPL-MCNC: 6.6 G/DL (ref 6.4–8.2)
RBC # BLD AUTO: 3.46 M/UL (ref 3.8–5.2)
SODIUM SERPL-SCNC: 138 MMOL/L (ref 136–145)
TROPONIN I SERPL-MCNC: <0.05 NG/ML
WBC # BLD AUTO: 7.6 K/UL (ref 3.6–11)

## 2018-12-12 PROCEDURE — 96411 CHEMO IV PUSH ADDL DRUG: CPT

## 2018-12-12 PROCEDURE — 84484 ASSAY OF TROPONIN QUANT: CPT

## 2018-12-12 PROCEDURE — 74011250636 HC RX REV CODE- 250/636

## 2018-12-12 PROCEDURE — 96413 CHEMO IV INFUSION 1 HR: CPT

## 2018-12-12 PROCEDURE — 80053 COMPREHEN METABOLIC PANEL: CPT

## 2018-12-12 PROCEDURE — 74011250636 HC RX REV CODE- 250/636: Performed by: INTERNAL MEDICINE

## 2018-12-12 PROCEDURE — 77030012965 HC NDL HUBR BBMI -A

## 2018-12-12 PROCEDURE — 96375 TX/PRO/DX INJ NEW DRUG ADDON: CPT

## 2018-12-12 PROCEDURE — 96367 TX/PROPH/DG ADDL SEQ IV INF: CPT

## 2018-12-12 PROCEDURE — 74011000258 HC RX REV CODE- 258: Performed by: INTERNAL MEDICINE

## 2018-12-12 PROCEDURE — 36415 COLL VENOUS BLD VENIPUNCTURE: CPT

## 2018-12-12 PROCEDURE — 85025 COMPLETE CBC W/AUTO DIFF WBC: CPT

## 2018-12-12 RX ORDER — TIZANIDINE 4 MG/1
TABLET ORAL
Refills: 0 | COMMUNITY
Start: 2018-12-07 | End: 2019-04-25 | Stop reason: ALTCHOICE

## 2018-12-12 RX ORDER — PALONOSETRON 0.05 MG/ML
0.25 INJECTION, SOLUTION INTRAVENOUS ONCE
Status: COMPLETED | OUTPATIENT
Start: 2018-12-12 | End: 2018-12-12

## 2018-12-12 RX ORDER — DOXORUBICIN HYDROCHLORIDE 2 MG/ML
30 INJECTION, SOLUTION INTRAVENOUS ONCE
Status: COMPLETED | OUTPATIENT
Start: 2018-12-12 | End: 2018-12-12

## 2018-12-12 RX ORDER — HEPARIN 100 UNIT/ML
300-500 SYRINGE INTRAVENOUS AS NEEDED
Status: ACTIVE | OUTPATIENT
Start: 2018-12-12 | End: 2018-12-12

## 2018-12-12 RX ORDER — SODIUM CHLORIDE 0.9 % (FLUSH) 0.9 %
10 SYRINGE (ML) INJECTION AS NEEDED
Status: ACTIVE | OUTPATIENT
Start: 2018-12-12 | End: 2018-12-12

## 2018-12-12 RX ORDER — SODIUM CHLORIDE 9 MG/ML
25 INJECTION, SOLUTION INTRAVENOUS CONTINUOUS
Status: DISPENSED | OUTPATIENT
Start: 2018-12-12 | End: 2018-12-13

## 2018-12-12 RX ORDER — AMOXICILLIN AND CLAVULANATE POTASSIUM 500; 125 MG/1; MG/1
TABLET, FILM COATED ORAL
Refills: 0 | COMMUNITY
Start: 2018-12-07 | End: 2019-01-18

## 2018-12-12 RX ORDER — SODIUM CHLORIDE 9 MG/ML
10 INJECTION INTRAMUSCULAR; INTRAVENOUS; SUBCUTANEOUS AS NEEDED
Status: ACTIVE | OUTPATIENT
Start: 2018-12-12 | End: 2018-12-12

## 2018-12-12 RX ADMIN — DOXORUBICIN HYDROCHLORIDE 54 MG: 2 INJECTION, SOLUTION INTRAVENOUS at 13:33

## 2018-12-12 RX ADMIN — DEXAMETHASONE SODIUM PHOSPHATE 12 MG: 4 INJECTION, SOLUTION INTRA-ARTICULAR; INTRALESIONAL; INTRAMUSCULAR; INTRAVENOUS; SOFT TISSUE at 13:11

## 2018-12-12 RX ADMIN — PALONOSETRON 0.25 MG: 0.05 INJECTION, SOLUTION INTRAVENOUS at 12:45

## 2018-12-12 RX ADMIN — Medication 500 UNITS: at 14:15

## 2018-12-12 RX ADMIN — SODIUM CHLORIDE 150 MG: 900 INJECTION, SOLUTION INTRAVENOUS at 12:49

## 2018-12-12 RX ADMIN — CYCLOPHOSPHAMIDE 1080 MG: 500 INJECTION, POWDER, FOR SOLUTION INTRAVENOUS; ORAL at 13:45

## 2018-12-12 RX ADMIN — DOXORUBICIN HYDROCHLORIDE 54 MG: 2 INJECTION, SOLUTION INTRAVENOUS at 13:30

## 2018-12-12 RX ADMIN — Medication 20 ML: at 14:15

## 2018-12-12 RX ADMIN — SODIUM CHLORIDE 25 ML/HR: 900 INJECTION, SOLUTION INTRAVENOUS at 12:42

## 2018-12-12 NOTE — PROGRESS NOTES
Problem: Knowledge Deficit  Goal: *Verbalizes understanding of procedures and medications  Outcome: Resolved/Met Date Met: 12/12/18  Pt using anti-emetics with relief, doing well on treatment

## 2018-12-12 NOTE — PROGRESS NOTES
David Huff is a 57 y.o. female here today for left sided breast cancer f/u. Dx 10/18. Patient receiving AC; cycle 3 which will follow w/ Taxol x 12. VS stable. Patient denies pain. Good appetite. Patient denies N/V/D and constipation. Patient denies numbness and tingling. Patient denies mouth ulcers; pt reports mouth sensitivity, gums feel textures and mouthwash burns. Patient denies cough. Patient denies SOB. Patient denies falls. Visit Vitals  /65   Pulse 81   Temp 97.8 °F (36.6 °C) (Oral)   Resp 16   Ht 5' 5\" (1.651 m)   Wt 153 lb 4.8 oz (69.5 kg)   SpO2 100%   BMI 25.51 kg/m²     Depression Screening completed today  Abuse Screening Completed  Fall Risk Completed      1. Have you been to the ER, urgent care clinic since your last visit? Hospitalized since your last visit? No    2. Have you seen or consulted any other health care providers outside of the [address] [place] [address] since your last visit? Include any pap smears or colon screening. No     Health Maintenance Review: Patient reminded of \"due or due soon\" health maintenance. I have asked the patient to contact his/her primary care provider (PCP) for follow-up on his/her health maintenance.

## 2018-12-12 NOTE — PROGRESS NOTES
[address], [address] [place] [address] Be De La Rosa, 200 [facility]  [phone]      Follow-up Note        Patient: Kumar Mccarthy MRN: [id number]  SSN: [id number]    YOB: 1970  Age: 57 y.o. Sex: female      Diagnosis:     1. Left breast carcinoma:  T1b N3a M0 (Stage IIIC) infiltrating ductal carcinoma, Tumor size 0.8 mm, LN +ve, grade 3, ER 0, [place] 15%, Her 2 -ve     Treatment:     1. Neoadjuvant chemotherapy   Adriamycin + Cytoxan - Cycle 3 Day 1    Subjective:      Kumar Mccarthy is a 57 y.o. female with a new diagnosis of left sided invasive breast carcinoma. She underwent a screening mammogram and was noted to have a density in the upper inner quadrant of the left breast. A biopsy of the lesion showed a diagnosis of invasive breast carcinoma ER -ve HI 15% and Her 2 negative. An MRI did not reveal any additional abnormalities. She has an enlarged supraclavicular LN on the same side which biopsy showed invasive breast cancer. She is receiving neoadjuvant chemotherapy and is tolerating it fairly well. She reports some fatigue and nausea.       Review of Systems:    Constitutional: fatigue  Eyes: negative  Ears, Nose, Mouth, Throat, and Face: negative  Respiratory: negative  Cardiovascular: negative  Gastrointestinal: nausea  Genitourinary:negative  Integument/Breast: negative  Hematologic/Lymphatic: palpable left neck mass  Musculoskeletal:negative  Neurological: negative        Past Medical History:   Diagnosis Date    Frequent headaches     Migraine     22 years     Past Surgical History:   Procedure Laterality Date    HX DILATION AND CURETTAGE      2010      Family History   Problem Relation Age of Onset    Headache Mother     Headache Maternal Aunt      Social History     Tobacco Use    Smoking status: Never Smoker    Smokeless tobacco: Never Used   Substance Use Topics    Alcohol use: No     Comment: social Prior to Admission medications    Medication Sig Start Date End Date Taking? Authorizing Provider   oxyCODONE-acetaminophen (PERCOCET) 5-325 mg per tablet Take 1 Tab by mouth every four (4) hours as needed for Pain. Max Daily Amount: 6 Tabs. 11/8/18   Amrita Hay MD   ondansetron (ZOFRAN ODT) 4 mg disintegrating tablet Take 1 Tab by mouth every eight (8) hours as needed for Nausea. 11/2/18   Soila Langley NP   prochlorperazine (COMPAZINE) 10 mg tablet Take 1 Tab by mouth every six (6) hours as needed for up to 30 doses. 11/2/18   Soila Langley NP   lidocaine-prilocaine (EMLA) topical cream Apply  to affected area as needed for Pain. 11/2/18   Soila Langley NP   MULTIVITAMIN PO Take  by mouth. Provider, Historical   ergocalciferol, vitamin D2, (VITAMIN D2 PO) Take  by mouth. Provider, Historical   SUMAtriptan succinate (ZEMBRACE SYMTOUCH) 3 mg/0.5 mL pnij 1 Syringe by SubCUTAneous route as needed. 1 at HA onset and repeat q 1 hour until WHELAN relieved or until used 4 in 24 hours 10/7/18   Mitch Robles NP   cyclobenzaprine (FLEXERIL) 10 mg tablet TK 1 T PO TID PRN 3/13/18   Provider, Historical   SUMAtriptan (IMITREX) 100 mg tablet Take 1tab at the onset of HA Repeat in 2 hours. MAX 2 tabs  in 24hrs 3/22/18   Mitch Robles NP          Allergies   Allergen Reactions    Latex Itching     Skin gets very dry         Objective:      Wt Readings from Last 3 Encounters:   12/12/18 153 lb 4.8 oz (69.5 kg)   12/12/18 153 lb 4.8 oz (69.5 kg)   11/28/18 156 lb 4.8 oz (70.9 kg)     Temp Readings from Last 3 Encounters:   12/12/18 97.8 °F (36.6 °C) (Oral)   12/12/18 97.5 °F (36.4 °C)   11/29/18 97.8 °F (36.6 °C)     BP Readings from Last 3 Encounters:   12/12/18 105/65   12/12/18 105/65   11/29/18 111/68     Pulse Readings from Last 3 Encounters:   12/12/18 81   12/12/18 77   11/29/18 82           Physical Exam:    GENERAL: alert, cooperative, no distress, appears stated age  EYE: negative  LYMPHATIC: palpable left supraclavicular/lower cervical node feels smaller in size  THROAT & NECK: normal and no erythema or exudates noted. LUNG: clear to auscultation bilaterally  HEART: regular rate and rhythm  ABDOMEN: soft, non-tender  EXTREMITIES:  no edema  SKIN: Normal.  NEUROLOGIC: negative      Lab Results   Component Value Date/Time    WBC 7.6 12/12/2018 11:35 AM    HGB 10.3 (L) 12/12/2018 11:35 AM    HCT 30.6 (L) 12/12/2018 11:35 AM    PLATELET 042 46/88/6781 11:35 AM    MCV 88.4 12/12/2018 11:35 AM       Lab Results   Component Value Date/Time    Sodium 141 11/14/2018 01:37 PM    Potassium 4.1 11/14/2018 01:37 PM    Chloride 109 (H) 11/14/2018 01:37 PM    CO2 23 11/14/2018 01:37 PM    Anion gap 9 11/14/2018 01:37 PM    Glucose 61 (L) 11/14/2018 01:37 PM    BUN 10 11/14/2018 01:37 PM    Creatinine 0.52 (L) 11/14/2018 01:37 PM    BUN/Creatinine ratio 19 11/14/2018 01:37 PM    GFR est AA >60 11/14/2018 01:37 PM    GFR est non-AA >60 11/14/2018 01:37 PM    Calcium 8.4 (L) 11/14/2018 01:37 PM    Bilirubin, total 0.3 11/14/2018 01:37 PM    AST (SGOT) 27 11/14/2018 01:37 PM    Alk. phosphatase 69 11/14/2018 01:37 PM    Protein, total 6.4 11/14/2018 01:37 PM    Albumin 3.3 (L) 11/14/2018 01:37 PM    Globulin 3.1 11/14/2018 01:37 PM    A-G Ratio 1.1 11/14/2018 01:37 PM    ALT (SGPT) 47 11/14/2018 01:37 PM         Assessment:     1. Left breast carcinoma:  T1b N3a M0 (Stage IIIC) infiltrating ductal carcinoma, Tumor size 0.8 mm, LN +ve, grade 3, ER 0, [place] 15%, Her 2 -ve      ECOG PS 0  Intent of Treatment - curative  Prognosis- excellent    LVEF - 54% - 11/9/2018    PET - 11/7/2018 - mildly hypermetabolic left supraclavicular and left MICHAEL lymph nodes. Receiving neoadjuvant chemotherapy   Adriamycin/Cyclophosphamide, Cycle 3 Day 1    Tolerating treatment  A detailed system by system evaluation of side effect was performed to assess chemotherapy related toxicity.    Some nausea - manageable   Fatigue - manageable  Blood counts are acceptable. Results reviewed with the patient. Symptom management form reviewed with patient. Responding to the treatment       2. Anemia related to chemotherapy     Observation        Plan:       · Continue ddAC  · Neulasta on Day 2  · Follow-up in 2 weeks      Signed by: Salazar Rocha MD                     December 12, 2018          CC. Cecily Hernandez MD  CC.  Chelsea Childs MD

## 2018-12-12 NOTE — PROGRESS NOTES
111 [facility]         December 12, 2018         RE: Gevena Foot        To Whom It May Concern,     This is to excuse Gevena Foot from work on 12/12/18. Gevena Foot may return to work on12/13/18.     Please feel free to contact my office if you have any questions or concerns.   Thank you for your assistance in this matter.     Chris Bradley RN

## 2018-12-12 NOTE — PROGRESS NOTES
1120 Pt arrived at [facility] ambulatory and in no distress for C3 adriamycin/ cytoxan. Assessment completed, pt reports she is on antibiotics for productive cough. No fevers. Port accessed, labs drawn. Pt seen at Dr. Anne Mitchell office. Visit Vitals  /74 (BP 1 Location: Left arm, BP Patient Position: Sitting)   Pulse 81   Temp 97.8 °F (36.6 °C)   Resp 18   Ht 5' 5\" (1.651 m)   Wt 69.5 kg (153 lb 4.8 oz)   SpO2 100%   BMI 25.51 kg/m²       Medications received:  NS @ KVO  aloxi 0.25 mg IV  Dexamethasone 12 mg IV over 10 min  Emend 150 mg IV over 20 min  Doxorubicin 108 mg IV push, blood return positive  Cytoxan 1080 mg IV over 30 min    Post treatment troponin drawn. Port flushed with NS and heparin and needle removed. Work note given per pt request    Visit Vitals  /65   Pulse 77   Temp 97.5 °F (36.4 °C)   Resp 18   Ht 5' 5\" (1.651 m)   Wt 69.5 kg (153 lb 4.8 oz)   SpO2 100%   BMI 25.51 kg/m²       1415 Tolerated treatment well, no adverse reaction noted. D/Cd from [facility] ambulatory and in no distress. Next appt tomorrow for neulasta    Recent Results (from the past 8 hour(s))   METABOLIC PANEL, COMPREHENSIVE    Collection Time: 12/12/18 11:35 AM   Result Value Ref Range    Sodium 138 136 - 145 mmol/L    Potassium 3.7 3.5 - 5.1 mmol/L    Chloride 108 97 - 108 mmol/L    CO2 25 21 - 32 mmol/L    Anion gap 5 5 - 15 mmol/L    Glucose 91 65 - 100 mg/dL    BUN 10 6 - 20 MG/DL    Creatinine 0.49 (L) 0.55 - 1.02 MG/DL    BUN/Creatinine ratio 20 12 - 20      GFR est AA >60 >60 ml/min/1.73m2    GFR est non-AA >60 >60 ml/min/1.73m2    Calcium 8.2 (L) 8.5 - 10.1 MG/DL    Bilirubin, total 0.1 (L) 0.2 - 1.0 MG/DL    ALT (SGPT) 49 12 - 78 U/L    AST (SGOT) 19 15 - 37 U/L    Alk.  phosphatase 131 (H) 45 - 117 U/L    Protein, total 6.6 6.4 - 8.2 g/dL    Albumin 3.2 (L) 3.5 - 5.0 g/dL    Globulin 3.4 2.0 - 4.0 g/dL    A-G Ratio 0.9 (L) 1.1 - 2.2     CBC WITH 3 PART DIFF    Collection Time: 12/12/18 11:35 AM   Result Value Ref Range    WBC 7.6 3.6 - 11.0 K/uL    RBC 3.46 (L) 3.80 - 5.20 M/uL    HGB 10.3 (L) 11.5 - 16.0 g/dL    HCT 30.6 (L) 35.0 - 47.0 %    MCV 88.4 80.0 - 99.0 FL    MCH 29.8 26.0 - 34.0 PG    MCHC 33.7 30.0 - 36.5 g/dL    RDW 12.2 11.8 - 15.8 %    PLATELET 826 140 - 483 K/uL    NEUTROPHILS 70 32 - 75 %    MIXED CELLS 17 (H) 3.2 - 16.9 %    LYMPHOCYTES 13 12 - 49 %    ABS. NEUTROPHILS 5.3 1.8 - 8.0 K/UL    ABS. MIXED CELLS 1.3 (H) 0.2 - 1.2 K/uL    ABS.  LYMPHOCYTES 1.0 0.8 - 3.5 K/UL    DF AUTOMATED

## 2018-12-13 ENCOUNTER — HOSPITAL ENCOUNTER (OUTPATIENT)
Dept: INFUSION THERAPY | Age: 48
Discharge: HOME | End: 2018-12-13
Payer: COMMERCIAL

## 2018-12-13 VITALS
HEART RATE: 95 BPM | SYSTOLIC BLOOD PRESSURE: 115 MMHG | RESPIRATION RATE: 18 BRPM | DIASTOLIC BLOOD PRESSURE: 68 MMHG | OXYGEN SATURATION: 100 % | TEMPERATURE: 98 F

## 2018-12-13 DIAGNOSIS — Z17.1 MALIGNANT NEOPLASM OF UPPER-INNER QUADRANT OF LEFT BREAST IN FEMALE, ESTROGEN RECEPTOR NEGATIVE (HCC): Primary | ICD-10-CM

## 2018-12-13 DIAGNOSIS — C50.212 MALIGNANT NEOPLASM OF UPPER-INNER QUADRANT OF LEFT BREAST IN FEMALE, ESTROGEN RECEPTOR NEGATIVE (HCC): Primary | ICD-10-CM

## 2018-12-13 PROCEDURE — 74011250636 HC RX REV CODE- 250/636: Performed by: INTERNAL MEDICINE

## 2018-12-13 PROCEDURE — 96372 THER/PROPH/DIAG INJ SC/IM: CPT

## 2018-12-13 RX ADMIN — PEGFILGRASTIM 6 MG: 6 INJECTION SUBCUTANEOUS at 15:48

## 2018-12-13 NOTE — PROGRESS NOTES
8000 [facility] Stay Note:    7164- Arrived for Neulasta    Assessment- unchanged. No new complaints voiced. Patient Vitals for the past 12 hrs:   Temp Pulse Resp BP SpO2   12/13/18 1547 98 °F (36.7 °C) 95 18 115/68 100 %     Neulasta 6 mg SQ injection given slowly in left arm    1550- Tolerated well. No reaction noted. Pt denies any acute problems/changes. Discharged from [facility] ambulatory. No distress.  Next appt: 12/26 at 1 PM.

## 2018-12-26 ENCOUNTER — HOSPITAL ENCOUNTER (OUTPATIENT)
Dept: INFUSION THERAPY | Age: 48
Discharge: HOME | End: 2018-12-26
Payer: COMMERCIAL

## 2018-12-26 ENCOUNTER — OFFICE VISIT (OUTPATIENT)
Dept: ONCOLOGY | Age: 48
End: 2018-12-26

## 2018-12-26 VITALS
DIASTOLIC BLOOD PRESSURE: 76 MMHG | RESPIRATION RATE: 18 BRPM | SYSTOLIC BLOOD PRESSURE: 120 MMHG | BODY MASS INDEX: 25.47 KG/M2 | TEMPERATURE: 98 F | HEIGHT: 65 IN | OXYGEN SATURATION: 100 % | WEIGHT: 152.9 LBS | HEART RATE: 78 BPM

## 2018-12-26 VITALS
TEMPERATURE: 97.9 F | OXYGEN SATURATION: 99 % | SYSTOLIC BLOOD PRESSURE: 112 MMHG | DIASTOLIC BLOOD PRESSURE: 78 MMHG | RESPIRATION RATE: 18 BRPM | HEART RATE: 82 BPM | WEIGHT: 152 LBS | BODY MASS INDEX: 25.33 KG/M2 | HEIGHT: 65 IN

## 2018-12-26 DIAGNOSIS — C50.212 MALIGNANT NEOPLASM OF UPPER-INNER QUADRANT OF LEFT BREAST IN FEMALE, ESTROGEN RECEPTOR NEGATIVE (HCC): Primary | ICD-10-CM

## 2018-12-26 DIAGNOSIS — Z79.899 ENCOUNTER FOR MONITORING CARDIOTOXIC DRUG THERAPY: ICD-10-CM

## 2018-12-26 DIAGNOSIS — Z17.1 MALIGNANT NEOPLASM OF UPPER-INNER QUADRANT OF LEFT BREAST IN FEMALE, ESTROGEN RECEPTOR NEGATIVE (HCC): Primary | ICD-10-CM

## 2018-12-26 DIAGNOSIS — Z51.81 ENCOUNTER FOR MONITORING CARDIOTOXIC DRUG THERAPY: ICD-10-CM

## 2018-12-26 DIAGNOSIS — D64.81 ANEMIA ASSOCIATED WITH CHEMOTHERAPY: ICD-10-CM

## 2018-12-26 DIAGNOSIS — Z09 CHEMOTHERAPY FOLLOW-UP EXAMINATION: ICD-10-CM

## 2018-12-26 DIAGNOSIS — T45.1X5A CINV (CHEMOTHERAPY-INDUCED NAUSEA AND VOMITING): ICD-10-CM

## 2018-12-26 DIAGNOSIS — T45.1X5A ANEMIA ASSOCIATED WITH CHEMOTHERAPY: ICD-10-CM

## 2018-12-26 DIAGNOSIS — R11.2 CINV (CHEMOTHERAPY-INDUCED NAUSEA AND VOMITING): ICD-10-CM

## 2018-12-26 LAB
ALBUMIN SERPL-MCNC: 3.1 G/DL (ref 3.5–5)
ALBUMIN/GLOB SERPL: 0.9 {RATIO} (ref 1.1–2.2)
ALP SERPL-CCNC: 115 U/L (ref 45–117)
ALT SERPL-CCNC: 29 U/L (ref 12–78)
ANION GAP SERPL CALC-SCNC: 5 MMOL/L (ref 5–15)
AST SERPL-CCNC: 12 U/L (ref 15–37)
BASO+EOS+MONOS # BLD AUTO: 1.2 K/UL (ref 0.2–1.2)
BASO+EOS+MONOS NFR BLD AUTO: 18 % (ref 3.2–16.9)
BILIRUB SERPL-MCNC: 0.1 MG/DL (ref 0.2–1)
BUN SERPL-MCNC: 8 MG/DL (ref 6–20)
BUN/CREAT SERPL: 17 (ref 12–20)
CALCIUM SERPL-MCNC: 8.4 MG/DL (ref 8.5–10.1)
CHLORIDE SERPL-SCNC: 107 MMOL/L (ref 97–108)
CO2 SERPL-SCNC: 27 MMOL/L (ref 21–32)
CREAT SERPL-MCNC: 0.46 MG/DL (ref 0.55–1.02)
DIFFERENTIAL METHOD BLD: ABNORMAL
ERYTHROCYTE [DISTWIDTH] IN BLOOD BY AUTOMATED COUNT: 12.8 % (ref 11.8–15.8)
GLOBULIN SER CALC-MCNC: 3.5 G/DL (ref 2–4)
GLUCOSE SERPL-MCNC: 93 MG/DL (ref 65–100)
HCT VFR BLD AUTO: 28.5 % (ref 35–47)
HGB BLD-MCNC: 9.6 G/DL (ref 11.5–16)
LYMPHOCYTES # BLD: 0.7 K/UL (ref 0.8–3.5)
LYMPHOCYTES NFR BLD: 11 % (ref 12–49)
MCH RBC QN AUTO: 29.8 PG (ref 26–34)
MCHC RBC AUTO-ENTMCNC: 33.7 G/DL (ref 30–36.5)
MCV RBC AUTO: 88.5 FL (ref 80–99)
NEUTS SEG # BLD: 4.7 K/UL (ref 1.8–8)
NEUTS SEG NFR BLD: 71 % (ref 32–75)
PLATELET # BLD AUTO: 174 K/UL (ref 150–400)
POTASSIUM SERPL-SCNC: 3.8 MMOL/L (ref 3.5–5.1)
PROT SERPL-MCNC: 6.6 G/DL (ref 6.4–8.2)
RBC # BLD AUTO: 3.22 M/UL (ref 3.8–5.2)
SODIUM SERPL-SCNC: 139 MMOL/L (ref 136–145)
TROPONIN I SERPL-MCNC: <0.05 NG/ML
WBC # BLD AUTO: 6.6 K/UL (ref 3.6–11)

## 2018-12-26 PROCEDURE — 96413 CHEMO IV INFUSION 1 HR: CPT

## 2018-12-26 PROCEDURE — 85025 COMPLETE CBC W/AUTO DIFF WBC: CPT

## 2018-12-26 PROCEDURE — 96375 TX/PRO/DX INJ NEW DRUG ADDON: CPT

## 2018-12-26 PROCEDURE — 77030012965 HC NDL HUBR BBMI -A

## 2018-12-26 PROCEDURE — 74011250636 HC RX REV CODE- 250/636

## 2018-12-26 PROCEDURE — 74011250636 HC RX REV CODE- 250/636: Performed by: INTERNAL MEDICINE

## 2018-12-26 PROCEDURE — 74011000258 HC RX REV CODE- 258: Performed by: INTERNAL MEDICINE

## 2018-12-26 PROCEDURE — 96367 TX/PROPH/DG ADDL SEQ IV INF: CPT

## 2018-12-26 PROCEDURE — 96411 CHEMO IV PUSH ADDL DRUG: CPT

## 2018-12-26 PROCEDURE — 84484 ASSAY OF TROPONIN QUANT: CPT

## 2018-12-26 PROCEDURE — 80053 COMPREHEN METABOLIC PANEL: CPT

## 2018-12-26 PROCEDURE — 36415 COLL VENOUS BLD VENIPUNCTURE: CPT

## 2018-12-26 RX ORDER — DOXORUBICIN HYDROCHLORIDE 2 MG/ML
30 INJECTION, SOLUTION INTRAVENOUS ONCE
Status: COMPLETED | OUTPATIENT
Start: 2018-12-26 | End: 2018-12-26

## 2018-12-26 RX ORDER — SODIUM CHLORIDE 9 MG/ML
10 INJECTION INTRAMUSCULAR; INTRAVENOUS; SUBCUTANEOUS AS NEEDED
Status: ACTIVE | OUTPATIENT
Start: 2018-12-26 | End: 2018-12-27

## 2018-12-26 RX ORDER — SODIUM CHLORIDE 9 MG/ML
25 INJECTION, SOLUTION INTRAVENOUS CONTINUOUS
Status: DISPENSED | OUTPATIENT
Start: 2018-12-26 | End: 2018-12-27

## 2018-12-26 RX ORDER — DEXAMETHASONE 4 MG/1
20 TABLET ORAL
Qty: 75 TAB | Refills: 0 | Status: SHIPPED | OUTPATIENT
Start: 2018-12-26 | End: 2019-03-30 | Stop reason: SDUPTHER

## 2018-12-26 RX ORDER — ONDANSETRON 4 MG/1
4 TABLET, ORALLY DISINTEGRATING ORAL
Qty: 30 TAB | Refills: 1 | Status: SHIPPED | OUTPATIENT
Start: 2018-12-26 | End: 2019-04-25 | Stop reason: ALTCHOICE

## 2018-12-26 RX ORDER — SODIUM CHLORIDE 0.9 % (FLUSH) 0.9 %
10 SYRINGE (ML) INJECTION AS NEEDED
Status: ACTIVE | OUTPATIENT
Start: 2018-12-26 | End: 2018-12-27

## 2018-12-26 RX ORDER — HEPARIN 100 UNIT/ML
300-500 SYRINGE INTRAVENOUS AS NEEDED
Status: ACTIVE | OUTPATIENT
Start: 2018-12-26 | End: 2018-12-27

## 2018-12-26 RX ORDER — PALONOSETRON 0.05 MG/ML
0.25 INJECTION, SOLUTION INTRAVENOUS ONCE
Status: COMPLETED | OUTPATIENT
Start: 2018-12-26 | End: 2018-12-26

## 2018-12-26 RX ADMIN — Medication 10 ML: at 13:51

## 2018-12-26 RX ADMIN — SODIUM CHLORIDE 25 ML/HR: 900 INJECTION, SOLUTION INTRAVENOUS at 13:51

## 2018-12-26 RX ADMIN — PALONOSETRON 0.25 MG: 0.05 INJECTION, SOLUTION INTRAVENOUS at 14:06

## 2018-12-26 RX ADMIN — CYCLOPHOSPHAMIDE 1080 MG: 500 INJECTION, POWDER, FOR SOLUTION INTRAVENOUS; ORAL at 15:55

## 2018-12-26 RX ADMIN — DOXORUBICIN HYDROCHLORIDE 54 MG: 2 INJECTION, SOLUTION INTRAVENOUS at 15:50

## 2018-12-26 RX ADMIN — SODIUM CHLORIDE 150 MG: 900 INJECTION, SOLUTION INTRAVENOUS at 15:01

## 2018-12-26 RX ADMIN — DEXAMETHASONE SODIUM PHOSPHATE 12 MG: 4 INJECTION, SOLUTION INTRA-ARTICULAR; INTRALESIONAL; INTRAMUSCULAR; INTRAVENOUS; SOFT TISSUE at 15:26

## 2018-12-26 RX ADMIN — DOXORUBICIN HYDROCHLORIDE 54 MG: 2 INJECTION, SOLUTION INTRAVENOUS at 15:47

## 2018-12-26 RX ADMIN — Medication 500 UNITS: at 16:30

## 2018-12-26 NOTE — PROGRESS NOTES
[address]          December 26, 2018         RE: Iam Castañeda        To Whom It May Concern,     This is to excuse Amparo Vital from work on 12/26/18.  Iam Merino return to work on12/27/18.     Please feel free to contact my office if you have any questions or concerns. Manny Piña you for your assistance in this matter.     Wesley Gallegos RN

## 2018-12-26 NOTE — PROGRESS NOTES
[address]  Social Work Navigator Encounter     Patient Name:  Ronald Garcia    Medical History: dx breast cancer     Advance Directives:    Narrative: pt spouse requesting new FMLA form to be complete thru pt completing 12 weeks Taxol. SW inquired if pcg would get paid for work and pcg replied no. PCG did not work while pt was being worked and paper work had pcg going back to work on 12/10. PCG requesting forms to cover him until she finished taxol which would be March 27 2019. EAGLE explained the NP could not do this as pt is working. PCG also taking care of son. SW advised and will be looking for forms.       Barriers to Care:     Plan:

## 2018-12-26 NOTE — PROGRESS NOTES
1330 Pt arrived at [facility] ambulatory and in no distress for C4 dose dense adriamycin / cytoxan. Assessment completed, no new complaints voiced. Port accessed, labs drawn. Pt was seen at Dr. Lucia Phillips prior to arrival. Pt denies possibility of being pregnant. Medications received:  NS @ KVO  aloxi 0.25 mg IV  Dexamethasone 12 mg IV  Emend 150 mg IV over 20 min  Doxorubicin 108 mg IV push, blood return brisk throughout  Cytoxan 1080 mg IV over 30 min    Post-treatment troponin drawn. Port flushed with NS and heparin and needle removed    Patient Vitals for the past 8 hrs:   Temp Pulse Resp BP SpO2   12/26/18 1632 -- 78 18 120/76 --   12/26/18 1352 98 °F (36.7 °C) 76 18 105/69 100 %       1630 Tolerated treatment well, no adverse reaction noted. D/Cd from [facility] ambulatory and in no distress. Next appt tomorrow for neulasta    Recent Results (from the past 8 hour(s))   METABOLIC PANEL, COMPREHENSIVE    Collection Time: 12/26/18  1:28 PM   Result Value Ref Range    Sodium 139 136 - 145 mmol/L    Potassium 3.8 3.5 - 5.1 mmol/L    Chloride 107 97 - 108 mmol/L    CO2 27 21 - 32 mmol/L    Anion gap 5 5 - 15 mmol/L    Glucose 93 65 - 100 mg/dL    BUN 8 6 - 20 MG/DL    Creatinine 0.46 (L) 0.55 - 1.02 MG/DL    BUN/Creatinine ratio 17 12 - 20      GFR est AA >60 >60 ml/min/1.73m2    GFR est non-AA >60 >60 ml/min/1.73m2    Calcium 8.4 (L) 8.5 - 10.1 MG/DL    Bilirubin, total 0.1 (L) 0.2 - 1.0 MG/DL    ALT (SGPT) 29 12 - 78 U/L    AST (SGOT) 12 (L) 15 - 37 U/L    Alk.  phosphatase 115 45 - 117 U/L    Protein, total 6.6 6.4 - 8.2 g/dL    Albumin 3.1 (L) 3.5 - 5.0 g/dL    Globulin 3.5 2.0 - 4.0 g/dL    A-G Ratio 0.9 (L) 1.1 - 2.2     CBC WITH 3 PART DIFF    Collection Time: 12/26/18  1:32 PM   Result Value Ref Range    WBC 6.6 3.6 - 11.0 K/uL    RBC 3.22 (L) 3.80 - 5.20 M/uL    HGB 9.6 (L) 11.5 - 16.0 g/dL    HCT 28.5 (L) 35.0 - 47.0 %    MCV 88.5 80.0 - 99.0 FL    MCH 29.8 26.0 - 34.0 PG    MCHC 33.7 30.0 - 36.5 g/dL    RDW 12.8 11.8 - 15.8 %    PLATELET 739 643 - 947 K/uL    NEUTROPHILS 71 32 - 75 %    MIXED CELLS 18 (H) 3.2 - 16.9 %    LYMPHOCYTES 11 (L) 12 - 49 %    ABS. NEUTROPHILS 4.7 1.8 - 8.0 K/UL    ABS. MIXED CELLS 1.2 0.2 - 1.2 K/uL    ABS.  LYMPHOCYTES 0.7 (L) 0.8 - 3.5 K/UL    DF AUTOMATED

## 2018-12-26 NOTE — PROGRESS NOTES
Pt is a 57 yr old here for routine follow up for left breast cancer, she is on cycle 4 of AC today, she reports felling well, eating well, only slight nausea controlled by PRN meds. Pt is happy with this being her last AC. Refills given on Zofran. Blood pressure 112/78, pulse 82, temperature 97.9 °F (36.6 °C), resp. rate 18, height 5' 5\" (1.651 m), weight 152 lb (68.9 kg), SpO2 99 %. Health Maintenance reviewed     1. Have you been to the ER, urgent care clinic since your last visit? Hospitalized since your last visit? no    2. Have you seen or consulted any other health care providers outside of the [address] [place] [address] since your last visit? Include any pap smears or colon screening.   no

## 2018-12-27 ENCOUNTER — HOSPITAL ENCOUNTER (OUTPATIENT)
Dept: INFUSION THERAPY | Age: 48
Discharge: HOME | End: 2018-12-27
Payer: COMMERCIAL

## 2018-12-27 VITALS
HEART RATE: 80 BPM | DIASTOLIC BLOOD PRESSURE: 73 MMHG | SYSTOLIC BLOOD PRESSURE: 116 MMHG | RESPIRATION RATE: 18 BRPM | OXYGEN SATURATION: 100 % | TEMPERATURE: 97.7 F

## 2018-12-27 DIAGNOSIS — C50.212 MALIGNANT NEOPLASM OF UPPER-INNER QUADRANT OF LEFT BREAST IN FEMALE, ESTROGEN RECEPTOR NEGATIVE (HCC): Primary | ICD-10-CM

## 2018-12-27 DIAGNOSIS — Z17.1 MALIGNANT NEOPLASM OF UPPER-INNER QUADRANT OF LEFT BREAST IN FEMALE, ESTROGEN RECEPTOR NEGATIVE (HCC): Primary | ICD-10-CM

## 2018-12-27 PROCEDURE — 74011250636 HC RX REV CODE- 250/636: Performed by: INTERNAL MEDICINE

## 2018-12-27 PROCEDURE — 96372 THER/PROPH/DIAG INJ SC/IM: CPT

## 2018-12-27 RX ADMIN — PEGFILGRASTIM 6 MG: 6 INJECTION SUBCUTANEOUS at 15:54

## 2018-12-27 NOTE — PROGRESS NOTES
[facility] Stay Note:  Arrived - 1550  Assessment - unchanged. Visit Vitals  /73 (BP 1 Location: Left arm, BP Patient Position: Sitting)   Pulse 80   Temp 97.7 °F (36.5 °C)   Resp 18   SpO2 100%     Neulasta 6mg SQ slowly in L arm.  1555 - Tolerated well. No reaction noted. Pt denies any acute problems/changes. Discharged from [facility] ambulatory. No distress.  Next appt: 1/11/19 at 1100

## 2018-12-28 ENCOUNTER — OFFICE VISIT (OUTPATIENT)
Dept: SURGERY | Age: 48
End: 2018-12-28

## 2018-12-28 VITALS
HEIGHT: 65 IN | HEART RATE: 90 BPM | WEIGHT: 153.5 LBS | BODY MASS INDEX: 25.58 KG/M2 | SYSTOLIC BLOOD PRESSURE: 105 MMHG | DIASTOLIC BLOOD PRESSURE: 63 MMHG

## 2018-12-28 DIAGNOSIS — C50.912 BREAST CANCER, STAGE 3, LEFT (HCC): Primary | ICD-10-CM

## 2018-12-28 NOTE — PATIENT INSTRUCTIONS

## 2018-12-28 NOTE — PROGRESS NOTES
HISTORY OF PRESENT ILLNESS  Jose Carlos Lynch is a 57 y.o. female. HPI ESTABLISHED patient here today for post op follow up dipika cath insertion. The patient is receiving chemotherapy and handling it \"the best she can. \" There has been no issues with the port. She no longer feels her enlarged lymph node  1. Left breast carcinoma:  Stage 3 infiltrating ductal carcinoma, Tumor size 0.8 mm, LN -v3, grade 3, ER 0, [place] 15%, Her 2 -ve. High risk mammaprint. + supraclav node  Enlarged supraclavicular LN on the same side which biopsy showed invasive breast cancer. Pet avid supraclav and IM nodes on left  11/8/18- port-a-cath insertion  11/14/2018 - neoadjuvant chemotherapy started    Review of Systems   All other systems reviewed and are negative. Physical Exam   Pulmonary/Chest: Right breast exhibits no inverted nipple, no mass, no nipple discharge, no skin change and no tenderness. Left breast exhibits no inverted nipple, no mass, no nipple discharge, no skin change and no tenderness. Breasts are symmetrical.   No palpable left supraclav node  No palpble left breast mass     Lymphadenopathy:     She has no cervical adenopathy. She has no axillary adenopathy. Nursing note and vitals reviewed. BREAST ULTRASOUND  Indication: Left  breast mass 10:00  Technique: The area was scanned using a high-frequency linear-array near-field transducer  Findings: biopsy cavity and clip seen, no true mass. Cavity 7 mm  Impression:good response to chemo  Disposition: continue chemo    ASSESSMENT and PLAN    ICD-10-CM ICD-9-CM    1.  Breast cancer, stage 3, left (HCC) C50.912 174.9      - responding well to chemo  No longer palpable supraclav node  - surgical plan will be lumpectomy, sln biopsy  - will need post op xrt will go ahead refer  - f/u in 6-8 weeks

## 2019-01-03 ENCOUNTER — TELEPHONE (OUTPATIENT)
Dept: ONCOLOGY | Age: 49
End: 2019-01-03

## 2019-01-03 NOTE — LETTER
NOTIFICATION RETURN TO WORK / SCHOOL 
 
1/3/2019 4:38 PM 
 
Ms. Troy Ramon [facility] [id number] To Whom It May Concern: 
 
Troy Ramon is currently under the care of [address] [place] [address]. Ms. Rafi Noland will begin weekly Taxol treatment beginning January 11 for twelve weeks. Please excuse her for one day per week for this. If there are questions or concerns please have the patient contact our office. Sincerely, Felicitas Huang

## 2019-01-03 NOTE — TELEPHONE ENCOUNTER
[employer]  Social Work Navigator Encounter     Patient Name:  Mike Moragn. Eboni Hindu    Medical History: dx  Stage IIIC breast cancer    Advance Directives:    Narrative: SW spoke with pt about FMLA and Letter. SW printed FMLA off internet for spouse and pt as pt employer [employer] has never provided documents for her. Pt will  on Friday Jan 4- SW placed with PSR to scan before pt picks up.       Barriers to Care:     Plan:

## 2019-01-04 ENCOUNTER — HOSPITAL ENCOUNTER (OUTPATIENT)
Dept: NON INVASIVE DIAGNOSTICS | Age: 49
Discharge: HOME | End: 2019-01-04
Attending: INTERNAL MEDICINE
Payer: COMMERCIAL

## 2019-01-04 ENCOUNTER — DOCUMENTATION ONLY (OUTPATIENT)
Dept: ONCOLOGY | Age: 49
End: 2019-01-04

## 2019-01-04 DIAGNOSIS — Z79.899 ENCOUNTER FOR MONITORING CARDIOTOXIC DRUG THERAPY: ICD-10-CM

## 2019-01-04 DIAGNOSIS — C50.212 MALIGNANT NEOPLASM OF UPPER-INNER QUADRANT OF LEFT BREAST IN FEMALE, ESTROGEN RECEPTOR NEGATIVE (HCC): ICD-10-CM

## 2019-01-04 DIAGNOSIS — C50.912 MALIGNANT NEOPLASM OF LEFT FEMALE BREAST, UNSPECIFIED ESTROGEN RECEPTOR STATUS, UNSPECIFIED SITE OF BREAST (HCC): Primary | ICD-10-CM

## 2019-01-04 DIAGNOSIS — Z17.1 MALIGNANT NEOPLASM OF UPPER-INNER QUADRANT OF LEFT BREAST IN FEMALE, ESTROGEN RECEPTOR NEGATIVE (HCC): ICD-10-CM

## 2019-01-04 DIAGNOSIS — Z51.81 ENCOUNTER FOR MONITORING CARDIOTOXIC DRUG THERAPY: ICD-10-CM

## 2019-01-04 PROCEDURE — 93306 TTE W/DOPPLER COMPLETE: CPT

## 2019-01-04 NOTE — PROGRESS NOTES
[employer]  Social Work Navigator Encounter     Patient Name:  Trinidad Rojas    Medical History: dx breast cancer    Advance Directives:    Narrative: pt came to  her FMLA and employer letter. SW faxed pt spouse FMLA and provided spouse copy / confirmation of fax.      Barriers to Care:     Plan:

## 2019-01-08 ENCOUNTER — TELEPHONE (OUTPATIENT)
Dept: ONCOLOGY | Age: 49
End: 2019-01-08

## 2019-01-08 RX ORDER — DIPHENHYDRAMINE HYDROCHLORIDE 50 MG/ML
50 INJECTION, SOLUTION INTRAMUSCULAR; INTRAVENOUS AS NEEDED
Status: CANCELLED
Start: 2019-03-08

## 2019-01-08 RX ORDER — HYDROCORTISONE SODIUM SUCCINATE 100 MG/2ML
100 INJECTION, POWDER, FOR SOLUTION INTRAMUSCULAR; INTRAVENOUS AS NEEDED
Status: CANCELLED | OUTPATIENT
Start: 2019-03-29

## 2019-01-08 RX ORDER — DIPHENHYDRAMINE HYDROCHLORIDE 50 MG/ML
50 INJECTION, SOLUTION INTRAMUSCULAR; INTRAVENOUS AS NEEDED
Status: CANCELLED
Start: 2019-01-18

## 2019-01-08 RX ORDER — ALBUTEROL SULFATE 0.83 MG/ML
2.5 SOLUTION RESPIRATORY (INHALATION) AS NEEDED
Status: CANCELLED
Start: 2019-01-25

## 2019-01-08 RX ORDER — ACETAMINOPHEN 325 MG/1
650 TABLET ORAL AS NEEDED
Status: CANCELLED
Start: 2019-03-22

## 2019-01-08 RX ORDER — DIPHENHYDRAMINE HYDROCHLORIDE 50 MG/ML
50 INJECTION, SOLUTION INTRAMUSCULAR; INTRAVENOUS AS NEEDED
Status: CANCELLED
Start: 2019-04-05

## 2019-01-08 RX ORDER — EPINEPHRINE 1 MG/ML
0.3 INJECTION, SOLUTION, CONCENTRATE INTRAVENOUS AS NEEDED
Status: CANCELLED | OUTPATIENT
Start: 2019-02-22

## 2019-01-08 RX ORDER — SODIUM CHLORIDE 9 MG/ML
10 INJECTION INTRAMUSCULAR; INTRAVENOUS; SUBCUTANEOUS AS NEEDED
Status: CANCELLED | OUTPATIENT
Start: 2019-01-11

## 2019-01-08 RX ORDER — SODIUM CHLORIDE 0.9 % (FLUSH) 0.9 %
10 SYRINGE (ML) INJECTION AS NEEDED
Status: CANCELLED
Start: 2019-03-08

## 2019-01-08 RX ORDER — SODIUM CHLORIDE 9 MG/ML
25 INJECTION, SOLUTION INTRAVENOUS CONTINUOUS
Status: CANCELLED | OUTPATIENT
Start: 2019-03-01 | End: 2019-03-01

## 2019-01-08 RX ORDER — SODIUM CHLORIDE 9 MG/ML
25 INJECTION, SOLUTION INTRAVENOUS CONTINUOUS
Status: CANCELLED | OUTPATIENT
Start: 2019-04-05 | End: 2019-04-05

## 2019-01-08 RX ORDER — SODIUM CHLORIDE 0.9 % (FLUSH) 0.9 %
10 SYRINGE (ML) INJECTION AS NEEDED
Status: CANCELLED
Start: 2019-04-05

## 2019-01-08 RX ORDER — DIPHENHYDRAMINE HYDROCHLORIDE 50 MG/ML
50 INJECTION, SOLUTION INTRAMUSCULAR; INTRAVENOUS AS NEEDED
Status: CANCELLED
Start: 2019-03-29

## 2019-01-08 RX ORDER — EPINEPHRINE 1 MG/ML
0.3 INJECTION, SOLUTION, CONCENTRATE INTRAVENOUS AS NEEDED
Status: CANCELLED | OUTPATIENT
Start: 2019-03-22

## 2019-01-08 RX ORDER — ALBUTEROL SULFATE 0.83 MG/ML
2.5 SOLUTION RESPIRATORY (INHALATION) AS NEEDED
Status: CANCELLED
Start: 2019-01-18

## 2019-01-08 RX ORDER — ONDANSETRON 2 MG/ML
8 INJECTION INTRAMUSCULAR; INTRAVENOUS AS NEEDED
Status: CANCELLED | OUTPATIENT
Start: 2019-02-08

## 2019-01-08 RX ORDER — HYDROCORTISONE SODIUM SUCCINATE 100 MG/2ML
100 INJECTION, POWDER, FOR SOLUTION INTRAMUSCULAR; INTRAVENOUS AS NEEDED
Status: CANCELLED | OUTPATIENT
Start: 2019-03-22

## 2019-01-08 RX ORDER — SODIUM CHLORIDE 0.9 % (FLUSH) 0.9 %
10 SYRINGE (ML) INJECTION AS NEEDED
Status: CANCELLED
Start: 2019-01-18

## 2019-01-08 RX ORDER — HEPARIN 100 UNIT/ML
300-500 SYRINGE INTRAVENOUS AS NEEDED
Status: CANCELLED
Start: 2019-02-08

## 2019-01-08 RX ORDER — HYDROCORTISONE SODIUM SUCCINATE 100 MG/2ML
100 INJECTION, POWDER, FOR SOLUTION INTRAMUSCULAR; INTRAVENOUS AS NEEDED
Status: CANCELLED | OUTPATIENT
Start: 2019-01-25

## 2019-01-08 RX ORDER — SODIUM CHLORIDE 0.9 % (FLUSH) 0.9 %
10 SYRINGE (ML) INJECTION AS NEEDED
Status: CANCELLED
Start: 2019-03-22

## 2019-01-08 RX ORDER — SODIUM CHLORIDE 0.9 % (FLUSH) 0.9 %
10 SYRINGE (ML) INJECTION AS NEEDED
Status: CANCELLED
Start: 2019-02-08

## 2019-01-08 RX ORDER — SODIUM CHLORIDE 9 MG/ML
25 INJECTION, SOLUTION INTRAVENOUS CONTINUOUS
Status: CANCELLED | OUTPATIENT
Start: 2019-01-11 | End: 2019-01-11

## 2019-01-08 RX ORDER — DEXAMETHASONE SODIUM PHOSPHATE 4 MG/ML
10 INJECTION, SOLUTION INTRA-ARTICULAR; INTRALESIONAL; INTRAMUSCULAR; INTRAVENOUS; SOFT TISSUE ONCE
Status: CANCELLED | OUTPATIENT
Start: 2019-03-29 | End: 2019-03-22

## 2019-01-08 RX ORDER — SODIUM CHLORIDE 9 MG/ML
10 INJECTION INTRAMUSCULAR; INTRAVENOUS; SUBCUTANEOUS AS NEEDED
Status: CANCELLED | OUTPATIENT
Start: 2019-04-05

## 2019-01-08 RX ORDER — ACETAMINOPHEN 325 MG/1
650 TABLET ORAL AS NEEDED
Status: CANCELLED
Start: 2019-01-18

## 2019-01-08 RX ORDER — SODIUM CHLORIDE 9 MG/ML
25 INJECTION, SOLUTION INTRAVENOUS CONTINUOUS
Status: CANCELLED | OUTPATIENT
Start: 2019-02-01 | End: 2019-02-01

## 2019-01-08 RX ORDER — SODIUM CHLORIDE 9 MG/ML
10 INJECTION INTRAMUSCULAR; INTRAVENOUS; SUBCUTANEOUS AS NEEDED
Status: CANCELLED | OUTPATIENT
Start: 2019-01-18

## 2019-01-08 RX ORDER — ONDANSETRON 2 MG/ML
8 INJECTION INTRAMUSCULAR; INTRAVENOUS AS NEEDED
Status: CANCELLED | OUTPATIENT
Start: 2019-03-29

## 2019-01-08 RX ORDER — DIPHENHYDRAMINE HYDROCHLORIDE 50 MG/ML
50 INJECTION, SOLUTION INTRAMUSCULAR; INTRAVENOUS ONCE
Status: CANCELLED
Start: 2019-04-05 | End: 2019-03-29

## 2019-01-08 RX ORDER — HYDROCORTISONE SODIUM SUCCINATE 100 MG/2ML
100 INJECTION, POWDER, FOR SOLUTION INTRAMUSCULAR; INTRAVENOUS AS NEEDED
Status: CANCELLED | OUTPATIENT
Start: 2019-04-05

## 2019-01-08 RX ORDER — SODIUM CHLORIDE 0.9 % (FLUSH) 0.9 %
10 SYRINGE (ML) INJECTION AS NEEDED
Status: CANCELLED
Start: 2019-02-22

## 2019-01-08 RX ORDER — DIPHENHYDRAMINE HYDROCHLORIDE 50 MG/ML
50 INJECTION, SOLUTION INTRAMUSCULAR; INTRAVENOUS ONCE
Status: CANCELLED
Start: 2019-03-22 | End: 2019-03-15

## 2019-01-08 RX ORDER — DIPHENHYDRAMINE HYDROCHLORIDE 50 MG/ML
50 INJECTION, SOLUTION INTRAMUSCULAR; INTRAVENOUS AS NEEDED
Status: CANCELLED
Start: 2019-03-01

## 2019-01-08 RX ORDER — ACETAMINOPHEN 325 MG/1
650 TABLET ORAL AS NEEDED
Status: CANCELLED
Start: 2019-02-22

## 2019-01-08 RX ORDER — DIPHENHYDRAMINE HYDROCHLORIDE 50 MG/ML
50 INJECTION, SOLUTION INTRAMUSCULAR; INTRAVENOUS AS NEEDED
Status: CANCELLED
Start: 2019-01-11

## 2019-01-08 RX ORDER — SODIUM CHLORIDE 9 MG/ML
25 INJECTION, SOLUTION INTRAVENOUS CONTINUOUS
Status: CANCELLED | OUTPATIENT
Start: 2019-01-25 | End: 2019-01-25

## 2019-01-08 RX ORDER — ALBUTEROL SULFATE 0.83 MG/ML
2.5 SOLUTION RESPIRATORY (INHALATION) AS NEEDED
Status: CANCELLED
Start: 2019-03-22

## 2019-01-08 RX ORDER — ONDANSETRON 2 MG/ML
8 INJECTION INTRAMUSCULAR; INTRAVENOUS AS NEEDED
Status: CANCELLED | OUTPATIENT
Start: 2019-02-22

## 2019-01-08 RX ORDER — ALBUTEROL SULFATE 0.83 MG/ML
2.5 SOLUTION RESPIRATORY (INHALATION) AS NEEDED
Status: CANCELLED
Start: 2019-04-05

## 2019-01-08 RX ORDER — SODIUM CHLORIDE 9 MG/ML
25 INJECTION, SOLUTION INTRAVENOUS CONTINUOUS
Status: CANCELLED | OUTPATIENT
Start: 2019-03-22 | End: 2019-03-22

## 2019-01-08 RX ORDER — SODIUM CHLORIDE 9 MG/ML
10 INJECTION INTRAMUSCULAR; INTRAVENOUS; SUBCUTANEOUS AS NEEDED
Status: CANCELLED | OUTPATIENT
Start: 2019-02-01

## 2019-01-08 RX ORDER — DEXAMETHASONE SODIUM PHOSPHATE 4 MG/ML
10 INJECTION, SOLUTION INTRA-ARTICULAR; INTRALESIONAL; INTRAMUSCULAR; INTRAVENOUS; SOFT TISSUE ONCE
Status: CANCELLED | OUTPATIENT
Start: 2019-03-08 | End: 2019-03-01

## 2019-01-08 RX ORDER — SODIUM CHLORIDE 9 MG/ML
10 INJECTION INTRAMUSCULAR; INTRAVENOUS; SUBCUTANEOUS AS NEEDED
Status: CANCELLED | OUTPATIENT
Start: 2019-03-29

## 2019-01-08 RX ORDER — HYDROCORTISONE SODIUM SUCCINATE 100 MG/2ML
100 INJECTION, POWDER, FOR SOLUTION INTRAMUSCULAR; INTRAVENOUS AS NEEDED
Status: CANCELLED | OUTPATIENT
Start: 2019-03-15

## 2019-01-08 RX ORDER — HEPARIN 100 UNIT/ML
300-500 SYRINGE INTRAVENOUS AS NEEDED
Status: CANCELLED
Start: 2019-01-18

## 2019-01-08 RX ORDER — ONDANSETRON 2 MG/ML
8 INJECTION INTRAMUSCULAR; INTRAVENOUS AS NEEDED
Status: CANCELLED | OUTPATIENT
Start: 2019-01-11

## 2019-01-08 RX ORDER — ONDANSETRON 2 MG/ML
8 INJECTION INTRAMUSCULAR; INTRAVENOUS AS NEEDED
Status: CANCELLED | OUTPATIENT
Start: 2019-03-01

## 2019-01-08 RX ORDER — DEXAMETHASONE SODIUM PHOSPHATE 4 MG/ML
10 INJECTION, SOLUTION INTRA-ARTICULAR; INTRALESIONAL; INTRAMUSCULAR; INTRAVENOUS; SOFT TISSUE ONCE
Status: CANCELLED | OUTPATIENT
Start: 2019-01-18 | End: 2019-01-18

## 2019-01-08 RX ORDER — ACETAMINOPHEN 325 MG/1
650 TABLET ORAL AS NEEDED
Status: CANCELLED
Start: 2019-01-11

## 2019-01-08 RX ORDER — DIPHENHYDRAMINE HYDROCHLORIDE 50 MG/ML
50 INJECTION, SOLUTION INTRAMUSCULAR; INTRAVENOUS ONCE
Status: CANCELLED
Start: 2019-02-22 | End: 2019-02-15

## 2019-01-08 RX ORDER — ACETAMINOPHEN 325 MG/1
650 TABLET ORAL AS NEEDED
Status: CANCELLED
Start: 2019-03-15

## 2019-01-08 RX ORDER — SODIUM CHLORIDE 9 MG/ML
10 INJECTION INTRAMUSCULAR; INTRAVENOUS; SUBCUTANEOUS AS NEEDED
Status: CANCELLED | OUTPATIENT
Start: 2019-03-22

## 2019-01-08 RX ORDER — ALBUTEROL SULFATE 0.83 MG/ML
2.5 SOLUTION RESPIRATORY (INHALATION) AS NEEDED
Status: CANCELLED
Start: 2019-03-01

## 2019-01-08 RX ORDER — DEXAMETHASONE SODIUM PHOSPHATE 4 MG/ML
10 INJECTION, SOLUTION INTRA-ARTICULAR; INTRALESIONAL; INTRAMUSCULAR; INTRAVENOUS; SOFT TISSUE ONCE
Status: CANCELLED | OUTPATIENT
Start: 2019-04-05 | End: 2019-03-29

## 2019-01-08 RX ORDER — EPINEPHRINE 1 MG/ML
0.3 INJECTION, SOLUTION, CONCENTRATE INTRAVENOUS AS NEEDED
Status: CANCELLED | OUTPATIENT
Start: 2019-02-01

## 2019-01-08 RX ORDER — DIPHENHYDRAMINE HYDROCHLORIDE 50 MG/ML
50 INJECTION, SOLUTION INTRAMUSCULAR; INTRAVENOUS AS NEEDED
Status: CANCELLED
Start: 2019-01-25

## 2019-01-08 RX ORDER — HEPARIN 100 UNIT/ML
300-500 SYRINGE INTRAVENOUS AS NEEDED
Status: CANCELLED
Start: 2019-03-15

## 2019-01-08 RX ORDER — EPINEPHRINE 1 MG/ML
0.3 INJECTION, SOLUTION, CONCENTRATE INTRAVENOUS AS NEEDED
Status: CANCELLED | OUTPATIENT
Start: 2019-01-25

## 2019-01-08 RX ORDER — DIPHENHYDRAMINE HYDROCHLORIDE 50 MG/ML
50 INJECTION, SOLUTION INTRAMUSCULAR; INTRAVENOUS ONCE
Status: CANCELLED
Start: 2019-01-11 | End: 2019-01-11

## 2019-01-08 RX ORDER — DEXAMETHASONE SODIUM PHOSPHATE 4 MG/ML
10 INJECTION, SOLUTION INTRA-ARTICULAR; INTRALESIONAL; INTRAMUSCULAR; INTRAVENOUS; SOFT TISSUE ONCE
Status: CANCELLED | OUTPATIENT
Start: 2019-03-15 | End: 2019-03-08

## 2019-01-08 RX ORDER — SODIUM CHLORIDE 9 MG/ML
25 INJECTION, SOLUTION INTRAVENOUS CONTINUOUS
Status: CANCELLED | OUTPATIENT
Start: 2019-03-15 | End: 2019-03-15

## 2019-01-08 RX ORDER — ACETAMINOPHEN 325 MG/1
650 TABLET ORAL AS NEEDED
Status: CANCELLED
Start: 2019-04-05

## 2019-01-08 RX ORDER — DIPHENHYDRAMINE HYDROCHLORIDE 50 MG/ML
50 INJECTION, SOLUTION INTRAMUSCULAR; INTRAVENOUS ONCE
Status: CANCELLED
Start: 2019-02-08 | End: 2019-02-08

## 2019-01-08 RX ORDER — EPINEPHRINE 1 MG/ML
0.3 INJECTION, SOLUTION, CONCENTRATE INTRAVENOUS AS NEEDED
Status: CANCELLED | OUTPATIENT
Start: 2019-03-08

## 2019-01-08 RX ORDER — SODIUM CHLORIDE 9 MG/ML
25 INJECTION, SOLUTION INTRAVENOUS CONTINUOUS
Status: CANCELLED | OUTPATIENT
Start: 2019-01-18 | End: 2019-01-18

## 2019-01-08 RX ORDER — HYDROCORTISONE SODIUM SUCCINATE 100 MG/2ML
100 INJECTION, POWDER, FOR SOLUTION INTRAMUSCULAR; INTRAVENOUS AS NEEDED
Status: CANCELLED | OUTPATIENT
Start: 2019-01-11

## 2019-01-08 RX ORDER — ONDANSETRON 2 MG/ML
8 INJECTION INTRAMUSCULAR; INTRAVENOUS AS NEEDED
Status: CANCELLED | OUTPATIENT
Start: 2019-04-05

## 2019-01-08 RX ORDER — DIPHENHYDRAMINE HYDROCHLORIDE 50 MG/ML
50 INJECTION, SOLUTION INTRAMUSCULAR; INTRAVENOUS ONCE
Status: CANCELLED
Start: 2019-01-18 | End: 2019-01-18

## 2019-01-08 RX ORDER — DIPHENHYDRAMINE HYDROCHLORIDE 50 MG/ML
50 INJECTION, SOLUTION INTRAMUSCULAR; INTRAVENOUS ONCE
Status: CANCELLED
Start: 2019-03-29 | End: 2019-03-22

## 2019-01-08 RX ORDER — EPINEPHRINE 1 MG/ML
0.3 INJECTION, SOLUTION, CONCENTRATE INTRAVENOUS AS NEEDED
Status: CANCELLED | OUTPATIENT
Start: 2019-03-01

## 2019-01-08 RX ORDER — DIPHENHYDRAMINE HYDROCHLORIDE 50 MG/ML
50 INJECTION, SOLUTION INTRAMUSCULAR; INTRAVENOUS ONCE
Status: CANCELLED
Start: 2019-01-25 | End: 2019-01-25

## 2019-01-08 RX ORDER — HEPARIN 100 UNIT/ML
300-500 SYRINGE INTRAVENOUS AS NEEDED
Status: CANCELLED
Start: 2019-03-08

## 2019-01-08 RX ORDER — ONDANSETRON 2 MG/ML
8 INJECTION INTRAMUSCULAR; INTRAVENOUS AS NEEDED
Status: CANCELLED | OUTPATIENT
Start: 2019-01-25

## 2019-01-08 RX ORDER — DIPHENHYDRAMINE HYDROCHLORIDE 50 MG/ML
50 INJECTION, SOLUTION INTRAMUSCULAR; INTRAVENOUS ONCE
Status: CANCELLED
Start: 2019-03-08 | End: 2019-03-01

## 2019-01-08 RX ORDER — ACETAMINOPHEN 325 MG/1
650 TABLET ORAL AS NEEDED
Status: CANCELLED
Start: 2019-02-08

## 2019-01-08 RX ORDER — HEPARIN 100 UNIT/ML
300-500 SYRINGE INTRAVENOUS AS NEEDED
Status: CANCELLED
Start: 2019-02-01

## 2019-01-08 RX ORDER — DIPHENHYDRAMINE HYDROCHLORIDE 50 MG/ML
50 INJECTION, SOLUTION INTRAMUSCULAR; INTRAVENOUS AS NEEDED
Status: CANCELLED
Start: 2019-03-15

## 2019-01-08 RX ORDER — SODIUM CHLORIDE 9 MG/ML
10 INJECTION INTRAMUSCULAR; INTRAVENOUS; SUBCUTANEOUS AS NEEDED
Status: CANCELLED | OUTPATIENT
Start: 2019-03-01

## 2019-01-08 RX ORDER — ONDANSETRON 2 MG/ML
8 INJECTION INTRAMUSCULAR; INTRAVENOUS AS NEEDED
Status: CANCELLED | OUTPATIENT
Start: 2019-03-22

## 2019-01-08 RX ORDER — ONDANSETRON 2 MG/ML
8 INJECTION INTRAMUSCULAR; INTRAVENOUS AS NEEDED
Status: CANCELLED | OUTPATIENT
Start: 2019-03-08

## 2019-01-08 RX ORDER — DIPHENHYDRAMINE HYDROCHLORIDE 50 MG/ML
50 INJECTION, SOLUTION INTRAMUSCULAR; INTRAVENOUS ONCE
Status: CANCELLED
Start: 2019-02-01 | End: 2019-02-01

## 2019-01-08 RX ORDER — DIPHENHYDRAMINE HYDROCHLORIDE 50 MG/ML
50 INJECTION, SOLUTION INTRAMUSCULAR; INTRAVENOUS AS NEEDED
Status: CANCELLED
Start: 2019-02-22

## 2019-01-08 RX ORDER — DIPHENHYDRAMINE HYDROCHLORIDE 50 MG/ML
50 INJECTION, SOLUTION INTRAMUSCULAR; INTRAVENOUS AS NEEDED
Status: CANCELLED
Start: 2019-03-22

## 2019-01-08 RX ORDER — HYDROCORTISONE SODIUM SUCCINATE 100 MG/2ML
100 INJECTION, POWDER, FOR SOLUTION INTRAMUSCULAR; INTRAVENOUS AS NEEDED
Status: CANCELLED | OUTPATIENT
Start: 2019-01-18

## 2019-01-08 RX ORDER — DEXAMETHASONE SODIUM PHOSPHATE 4 MG/ML
10 INJECTION, SOLUTION INTRA-ARTICULAR; INTRALESIONAL; INTRAMUSCULAR; INTRAVENOUS; SOFT TISSUE ONCE
Status: CANCELLED | OUTPATIENT
Start: 2019-01-25 | End: 2019-01-25

## 2019-01-08 RX ORDER — ALBUTEROL SULFATE 0.83 MG/ML
2.5 SOLUTION RESPIRATORY (INHALATION) AS NEEDED
Status: CANCELLED
Start: 2019-03-15

## 2019-01-08 RX ORDER — SODIUM CHLORIDE 9 MG/ML
25 INJECTION, SOLUTION INTRAVENOUS CONTINUOUS
Status: CANCELLED | OUTPATIENT
Start: 2019-03-29 | End: 2019-03-29

## 2019-01-08 RX ORDER — ACETAMINOPHEN 325 MG/1
650 TABLET ORAL AS NEEDED
Status: CANCELLED
Start: 2019-03-08

## 2019-01-08 RX ORDER — HEPARIN 100 UNIT/ML
300-500 SYRINGE INTRAVENOUS AS NEEDED
Status: CANCELLED
Start: 2019-01-25

## 2019-01-08 RX ORDER — DEXAMETHASONE SODIUM PHOSPHATE 4 MG/ML
10 INJECTION, SOLUTION INTRA-ARTICULAR; INTRALESIONAL; INTRAMUSCULAR; INTRAVENOUS; SOFT TISSUE ONCE
Status: CANCELLED | OUTPATIENT
Start: 2019-03-22 | End: 2019-03-15

## 2019-01-08 RX ORDER — SODIUM CHLORIDE 9 MG/ML
10 INJECTION INTRAMUSCULAR; INTRAVENOUS; SUBCUTANEOUS AS NEEDED
Status: CANCELLED | OUTPATIENT
Start: 2019-02-22

## 2019-01-08 RX ORDER — EPINEPHRINE 1 MG/ML
0.3 INJECTION, SOLUTION, CONCENTRATE INTRAVENOUS AS NEEDED
Status: CANCELLED | OUTPATIENT
Start: 2019-01-11

## 2019-01-08 RX ORDER — DIPHENHYDRAMINE HYDROCHLORIDE 50 MG/ML
50 INJECTION, SOLUTION INTRAMUSCULAR; INTRAVENOUS AS NEEDED
Status: CANCELLED
Start: 2019-02-08

## 2019-01-08 RX ORDER — SODIUM CHLORIDE 9 MG/ML
10 INJECTION INTRAMUSCULAR; INTRAVENOUS; SUBCUTANEOUS AS NEEDED
Status: CANCELLED | OUTPATIENT
Start: 2019-01-25

## 2019-01-08 RX ORDER — ONDANSETRON 2 MG/ML
8 INJECTION INTRAMUSCULAR; INTRAVENOUS AS NEEDED
Status: CANCELLED | OUTPATIENT
Start: 2019-02-01

## 2019-01-08 RX ORDER — SODIUM CHLORIDE 0.9 % (FLUSH) 0.9 %
10 SYRINGE (ML) INJECTION AS NEEDED
Status: CANCELLED
Start: 2019-03-15

## 2019-01-08 RX ORDER — DEXAMETHASONE SODIUM PHOSPHATE 4 MG/ML
10 INJECTION, SOLUTION INTRA-ARTICULAR; INTRALESIONAL; INTRAMUSCULAR; INTRAVENOUS; SOFT TISSUE ONCE
Status: CANCELLED | OUTPATIENT
Start: 2019-02-08 | End: 2019-02-08

## 2019-01-08 RX ORDER — ALBUTEROL SULFATE 0.83 MG/ML
2.5 SOLUTION RESPIRATORY (INHALATION) AS NEEDED
Status: CANCELLED
Start: 2019-02-08

## 2019-01-08 RX ORDER — HYDROCORTISONE SODIUM SUCCINATE 100 MG/2ML
100 INJECTION, POWDER, FOR SOLUTION INTRAMUSCULAR; INTRAVENOUS AS NEEDED
Status: CANCELLED | OUTPATIENT
Start: 2019-02-22

## 2019-01-08 RX ORDER — ACETAMINOPHEN 325 MG/1
650 TABLET ORAL AS NEEDED
Status: CANCELLED
Start: 2019-01-25

## 2019-01-08 RX ORDER — SODIUM CHLORIDE 9 MG/ML
10 INJECTION INTRAMUSCULAR; INTRAVENOUS; SUBCUTANEOUS AS NEEDED
Status: CANCELLED | OUTPATIENT
Start: 2019-03-15

## 2019-01-08 RX ORDER — SODIUM CHLORIDE 0.9 % (FLUSH) 0.9 %
10 SYRINGE (ML) INJECTION AS NEEDED
Status: CANCELLED
Start: 2019-03-01

## 2019-01-08 RX ORDER — EPINEPHRINE 1 MG/ML
0.3 INJECTION, SOLUTION, CONCENTRATE INTRAVENOUS AS NEEDED
Status: CANCELLED | OUTPATIENT
Start: 2019-03-29

## 2019-01-08 RX ORDER — EPINEPHRINE 1 MG/ML
0.3 INJECTION, SOLUTION, CONCENTRATE INTRAVENOUS AS NEEDED
Status: CANCELLED | OUTPATIENT
Start: 2019-02-08

## 2019-01-08 RX ORDER — ACETAMINOPHEN 325 MG/1
650 TABLET ORAL AS NEEDED
Status: CANCELLED
Start: 2019-03-01

## 2019-01-08 RX ORDER — DIPHENHYDRAMINE HYDROCHLORIDE 50 MG/ML
50 INJECTION, SOLUTION INTRAMUSCULAR; INTRAVENOUS ONCE
Status: CANCELLED
Start: 2019-03-15 | End: 2019-03-08

## 2019-01-08 RX ORDER — DEXAMETHASONE SODIUM PHOSPHATE 4 MG/ML
10 INJECTION, SOLUTION INTRA-ARTICULAR; INTRALESIONAL; INTRAMUSCULAR; INTRAVENOUS; SOFT TISSUE ONCE
Status: CANCELLED | OUTPATIENT
Start: 2019-01-11 | End: 2019-01-11

## 2019-01-08 RX ORDER — SODIUM CHLORIDE 9 MG/ML
25 INJECTION, SOLUTION INTRAVENOUS CONTINUOUS
Status: CANCELLED | OUTPATIENT
Start: 2019-02-08 | End: 2019-02-08

## 2019-01-08 RX ORDER — DIPHENHYDRAMINE HYDROCHLORIDE 50 MG/ML
50 INJECTION, SOLUTION INTRAMUSCULAR; INTRAVENOUS ONCE
Status: CANCELLED
Start: 2019-03-01 | End: 2019-02-22

## 2019-01-08 RX ORDER — ONDANSETRON 2 MG/ML
8 INJECTION INTRAMUSCULAR; INTRAVENOUS AS NEEDED
Status: CANCELLED | OUTPATIENT
Start: 2019-03-15

## 2019-01-08 RX ORDER — ONDANSETRON 2 MG/ML
8 INJECTION INTRAMUSCULAR; INTRAVENOUS AS NEEDED
Status: CANCELLED | OUTPATIENT
Start: 2019-01-18

## 2019-01-08 RX ORDER — HEPARIN 100 UNIT/ML
300-500 SYRINGE INTRAVENOUS AS NEEDED
Status: CANCELLED
Start: 2019-02-22

## 2019-01-08 RX ORDER — HYDROCORTISONE SODIUM SUCCINATE 100 MG/2ML
100 INJECTION, POWDER, FOR SOLUTION INTRAMUSCULAR; INTRAVENOUS AS NEEDED
Status: CANCELLED | OUTPATIENT
Start: 2019-02-08

## 2019-01-08 RX ORDER — ALBUTEROL SULFATE 0.83 MG/ML
2.5 SOLUTION RESPIRATORY (INHALATION) AS NEEDED
Status: CANCELLED
Start: 2019-02-22

## 2019-01-08 RX ORDER — DEXAMETHASONE SODIUM PHOSPHATE 4 MG/ML
10 INJECTION, SOLUTION INTRA-ARTICULAR; INTRALESIONAL; INTRAMUSCULAR; INTRAVENOUS; SOFT TISSUE ONCE
Status: CANCELLED | OUTPATIENT
Start: 2019-03-01 | End: 2019-02-22

## 2019-01-08 RX ORDER — HEPARIN 100 UNIT/ML
300-500 SYRINGE INTRAVENOUS AS NEEDED
Status: CANCELLED
Start: 2019-03-29

## 2019-01-08 RX ORDER — ACETAMINOPHEN 325 MG/1
650 TABLET ORAL AS NEEDED
Status: CANCELLED
Start: 2019-02-01

## 2019-01-08 RX ORDER — DIPHENHYDRAMINE HYDROCHLORIDE 50 MG/ML
50 INJECTION, SOLUTION INTRAMUSCULAR; INTRAVENOUS AS NEEDED
Status: CANCELLED
Start: 2019-02-01

## 2019-01-08 RX ORDER — HYDROCORTISONE SODIUM SUCCINATE 100 MG/2ML
100 INJECTION, POWDER, FOR SOLUTION INTRAMUSCULAR; INTRAVENOUS AS NEEDED
Status: CANCELLED | OUTPATIENT
Start: 2019-02-01

## 2019-01-08 RX ORDER — SODIUM CHLORIDE 0.9 % (FLUSH) 0.9 %
10 SYRINGE (ML) INJECTION AS NEEDED
Status: CANCELLED
Start: 2019-01-11

## 2019-01-08 RX ORDER — SODIUM CHLORIDE 9 MG/ML
10 INJECTION INTRAMUSCULAR; INTRAVENOUS; SUBCUTANEOUS AS NEEDED
Status: CANCELLED | OUTPATIENT
Start: 2019-02-08

## 2019-01-08 RX ORDER — HYDROCORTISONE SODIUM SUCCINATE 100 MG/2ML
100 INJECTION, POWDER, FOR SOLUTION INTRAMUSCULAR; INTRAVENOUS AS NEEDED
Status: CANCELLED | OUTPATIENT
Start: 2019-03-01

## 2019-01-08 RX ORDER — DEXAMETHASONE SODIUM PHOSPHATE 4 MG/ML
10 INJECTION, SOLUTION INTRA-ARTICULAR; INTRALESIONAL; INTRAMUSCULAR; INTRAVENOUS; SOFT TISSUE ONCE
Status: CANCELLED | OUTPATIENT
Start: 2019-02-01 | End: 2019-02-01

## 2019-01-08 RX ORDER — ALBUTEROL SULFATE 0.83 MG/ML
2.5 SOLUTION RESPIRATORY (INHALATION) AS NEEDED
Status: CANCELLED
Start: 2019-01-11

## 2019-01-08 RX ORDER — ALBUTEROL SULFATE 0.83 MG/ML
2.5 SOLUTION RESPIRATORY (INHALATION) AS NEEDED
Status: CANCELLED
Start: 2019-03-29

## 2019-01-08 RX ORDER — HEPARIN 100 UNIT/ML
300-500 SYRINGE INTRAVENOUS AS NEEDED
Status: CANCELLED
Start: 2019-01-11

## 2019-01-08 RX ORDER — SODIUM CHLORIDE 9 MG/ML
10 INJECTION INTRAMUSCULAR; INTRAVENOUS; SUBCUTANEOUS AS NEEDED
Status: CANCELLED | OUTPATIENT
Start: 2019-03-08

## 2019-01-08 RX ORDER — SODIUM CHLORIDE 0.9 % (FLUSH) 0.9 %
10 SYRINGE (ML) INJECTION AS NEEDED
Status: CANCELLED
Start: 2019-01-25

## 2019-01-08 RX ORDER — EPINEPHRINE 1 MG/ML
0.3 INJECTION, SOLUTION, CONCENTRATE INTRAVENOUS AS NEEDED
Status: CANCELLED | OUTPATIENT
Start: 2019-03-15

## 2019-01-08 RX ORDER — DEXAMETHASONE SODIUM PHOSPHATE 4 MG/ML
10 INJECTION, SOLUTION INTRA-ARTICULAR; INTRALESIONAL; INTRAMUSCULAR; INTRAVENOUS; SOFT TISSUE ONCE
Status: CANCELLED | OUTPATIENT
Start: 2019-02-22 | End: 2019-02-15

## 2019-01-08 RX ORDER — SODIUM CHLORIDE 9 MG/ML
25 INJECTION, SOLUTION INTRAVENOUS CONTINUOUS
Status: CANCELLED | OUTPATIENT
Start: 2019-02-22 | End: 2019-02-22

## 2019-01-08 RX ORDER — SODIUM CHLORIDE 0.9 % (FLUSH) 0.9 %
10 SYRINGE (ML) INJECTION AS NEEDED
Status: CANCELLED
Start: 2019-02-01

## 2019-01-08 RX ORDER — HEPARIN 100 UNIT/ML
300-500 SYRINGE INTRAVENOUS AS NEEDED
Status: CANCELLED
Start: 2019-03-22

## 2019-01-08 RX ORDER — SODIUM CHLORIDE 0.9 % (FLUSH) 0.9 %
10 SYRINGE (ML) INJECTION AS NEEDED
Status: CANCELLED
Start: 2019-03-29

## 2019-01-08 RX ORDER — ALBUTEROL SULFATE 0.83 MG/ML
2.5 SOLUTION RESPIRATORY (INHALATION) AS NEEDED
Status: CANCELLED
Start: 2019-03-08

## 2019-01-08 RX ORDER — HYDROCORTISONE SODIUM SUCCINATE 100 MG/2ML
100 INJECTION, POWDER, FOR SOLUTION INTRAMUSCULAR; INTRAVENOUS AS NEEDED
Status: CANCELLED | OUTPATIENT
Start: 2019-03-08

## 2019-01-08 RX ORDER — SODIUM CHLORIDE 9 MG/ML
25 INJECTION, SOLUTION INTRAVENOUS CONTINUOUS
Status: CANCELLED | OUTPATIENT
Start: 2019-03-08 | End: 2019-03-08

## 2019-01-08 RX ORDER — ALBUTEROL SULFATE 0.83 MG/ML
2.5 SOLUTION RESPIRATORY (INHALATION) AS NEEDED
Status: CANCELLED
Start: 2019-02-01

## 2019-01-08 RX ORDER — HEPARIN 100 UNIT/ML
300-500 SYRINGE INTRAVENOUS AS NEEDED
Status: CANCELLED
Start: 2019-03-01

## 2019-01-08 RX ORDER — EPINEPHRINE 1 MG/ML
0.3 INJECTION, SOLUTION, CONCENTRATE INTRAVENOUS AS NEEDED
Status: CANCELLED | OUTPATIENT
Start: 2019-01-18

## 2019-01-08 RX ORDER — HEPARIN 100 UNIT/ML
300-500 SYRINGE INTRAVENOUS AS NEEDED
Status: CANCELLED
Start: 2019-04-05

## 2019-01-08 RX ORDER — ACETAMINOPHEN 325 MG/1
650 TABLET ORAL AS NEEDED
Status: CANCELLED
Start: 2019-03-29

## 2019-01-08 RX ORDER — EPINEPHRINE 1 MG/ML
0.3 INJECTION, SOLUTION, CONCENTRATE INTRAVENOUS AS NEEDED
Status: CANCELLED | OUTPATIENT
Start: 2019-04-05

## 2019-01-08 NOTE — TELEPHONE ENCOUNTER
Patient called for clarification. She is confused about the instructions for dexametasone. Instruction that came with the medication seems different to her than what she was instructed verbally.   Please assist.

## 2019-01-08 NOTE — TELEPHONE ENCOUNTER
Return call placed to pt. Pt re instructed to take 20 mg of the Dex the night before chemo. Patient verbalized understanding.

## 2019-01-09 ENCOUNTER — APPOINTMENT (OUTPATIENT)
Dept: INFUSION THERAPY | Age: 49
End: 2019-01-09

## 2019-01-11 ENCOUNTER — DOCUMENTATION ONLY (OUTPATIENT)
Dept: ONCOLOGY | Age: 49
End: 2019-01-11

## 2019-01-11 ENCOUNTER — OFFICE VISIT (OUTPATIENT)
Dept: ONCOLOGY | Age: 49
End: 2019-01-11

## 2019-01-11 ENCOUNTER — HOSPITAL ENCOUNTER (OUTPATIENT)
Dept: INFUSION THERAPY | Age: 49
Discharge: HOME | End: 2019-01-11
Payer: COMMERCIAL

## 2019-01-11 VITALS
WEIGHT: 152.2 LBS | SYSTOLIC BLOOD PRESSURE: 115 MMHG | TEMPERATURE: 98 F | DIASTOLIC BLOOD PRESSURE: 64 MMHG | OXYGEN SATURATION: 100 % | RESPIRATION RATE: 16 BRPM | BODY MASS INDEX: 25.36 KG/M2 | HEART RATE: 98 BPM | HEIGHT: 65 IN

## 2019-01-11 VITALS
WEIGHT: 152.2 LBS | BODY MASS INDEX: 25.36 KG/M2 | SYSTOLIC BLOOD PRESSURE: 127 MMHG | OXYGEN SATURATION: 100 % | HEIGHT: 65 IN | TEMPERATURE: 98 F | RESPIRATION RATE: 18 BRPM | DIASTOLIC BLOOD PRESSURE: 80 MMHG | HEART RATE: 87 BPM

## 2019-01-11 DIAGNOSIS — D64.81 ANEMIA ASSOCIATED WITH CHEMOTHERAPY: ICD-10-CM

## 2019-01-11 DIAGNOSIS — Z17.1 MALIGNANT NEOPLASM OF UPPER-INNER QUADRANT OF LEFT BREAST IN FEMALE, ESTROGEN RECEPTOR NEGATIVE (HCC): Primary | ICD-10-CM

## 2019-01-11 DIAGNOSIS — C50.212 MALIGNANT NEOPLASM OF UPPER-INNER QUADRANT OF LEFT BREAST IN FEMALE, ESTROGEN RECEPTOR NEGATIVE (HCC): Primary | ICD-10-CM

## 2019-01-11 DIAGNOSIS — Z51.81 ENCOUNTER FOR MONITORING CARDIOTOXIC DRUG THERAPY: ICD-10-CM

## 2019-01-11 DIAGNOSIS — R23.8 SKIN IRRITATION: ICD-10-CM

## 2019-01-11 DIAGNOSIS — Z79.899 ENCOUNTER FOR MONITORING CARDIOTOXIC DRUG THERAPY: ICD-10-CM

## 2019-01-11 DIAGNOSIS — T45.1X5A ANEMIA ASSOCIATED WITH CHEMOTHERAPY: ICD-10-CM

## 2019-01-11 DIAGNOSIS — Z09 CHEMOTHERAPY FOLLOW-UP EXAMINATION: ICD-10-CM

## 2019-01-11 LAB
ALBUMIN SERPL-MCNC: 3.5 G/DL (ref 3.5–5)
ALBUMIN/GLOB SERPL: 1 {RATIO} (ref 1.1–2.2)
ALP SERPL-CCNC: 115 U/L (ref 45–117)
ALT SERPL-CCNC: 20 U/L (ref 12–78)
ANION GAP SERPL CALC-SCNC: 7 MMOL/L (ref 5–15)
AST SERPL-CCNC: 16 U/L (ref 15–37)
BASO+EOS+MONOS # BLD AUTO: 0.2 K/UL (ref 0.2–1.2)
BASO+EOS+MONOS NFR BLD AUTO: 1 % (ref 3.2–16.9)
BILIRUB SERPL-MCNC: 0.2 MG/DL (ref 0.2–1)
BUN SERPL-MCNC: 9 MG/DL (ref 6–20)
BUN/CREAT SERPL: 19 (ref 12–20)
CALCIUM SERPL-MCNC: 8.6 MG/DL (ref 8.5–10.1)
CHLORIDE SERPL-SCNC: 105 MMOL/L (ref 97–108)
CO2 SERPL-SCNC: 25 MMOL/L (ref 21–32)
CREAT SERPL-MCNC: 0.48 MG/DL (ref 0.55–1.02)
DIFFERENTIAL METHOD BLD: ABNORMAL
ERYTHROCYTE [DISTWIDTH] IN BLOOD BY AUTOMATED COUNT: 15.8 % (ref 11.8–15.8)
GLOBULIN SER CALC-MCNC: 3.6 G/DL (ref 2–4)
GLUCOSE SERPL-MCNC: 126 MG/DL (ref 65–100)
HCT VFR BLD AUTO: 26.5 % (ref 35–47)
HGB BLD-MCNC: 9.2 G/DL (ref 11.5–16)
LYMPHOCYTES # BLD: 0.3 K/UL (ref 0.8–3.5)
LYMPHOCYTES NFR BLD: 2 % (ref 12–49)
MCH RBC QN AUTO: 31.3 PG (ref 26–34)
MCHC RBC AUTO-ENTMCNC: 34.7 G/DL (ref 30–36.5)
MCV RBC AUTO: 90.1 FL (ref 80–99)
NEUTS SEG # BLD: 12.8 K/UL (ref 1.8–8)
NEUTS SEG NFR BLD: 97 % (ref 32–75)
PLATELET # BLD AUTO: 258 K/UL (ref 150–400)
POTASSIUM SERPL-SCNC: 3.5 MMOL/L (ref 3.5–5.1)
PROT SERPL-MCNC: 7.1 G/DL (ref 6.4–8.2)
RBC # BLD AUTO: 2.94 M/UL (ref 3.8–5.2)
SODIUM SERPL-SCNC: 137 MMOL/L (ref 136–145)
WBC # BLD AUTO: 13.3 K/UL (ref 3.6–11)

## 2019-01-11 PROCEDURE — 74011250636 HC RX REV CODE- 250/636: Performed by: INTERNAL MEDICINE

## 2019-01-11 PROCEDURE — 74011000250 HC RX REV CODE- 250: Performed by: INTERNAL MEDICINE

## 2019-01-11 PROCEDURE — 96375 TX/PRO/DX INJ NEW DRUG ADDON: CPT

## 2019-01-11 PROCEDURE — 80053 COMPREHEN METABOLIC PANEL: CPT

## 2019-01-11 PROCEDURE — 85025 COMPLETE CBC W/AUTO DIFF WBC: CPT

## 2019-01-11 PROCEDURE — 36415 COLL VENOUS BLD VENIPUNCTURE: CPT

## 2019-01-11 PROCEDURE — 77030012965 HC NDL HUBR BBMI -A

## 2019-01-11 PROCEDURE — 74011250637 HC RX REV CODE- 250/637: Performed by: NURSE PRACTITIONER

## 2019-01-11 PROCEDURE — 96413 CHEMO IV INFUSION 1 HR: CPT

## 2019-01-11 RX ORDER — HEPARIN 100 UNIT/ML
300-500 SYRINGE INTRAVENOUS AS NEEDED
Status: ACTIVE | OUTPATIENT
Start: 2019-01-11 | End: 2019-01-11

## 2019-01-11 RX ORDER — DIPHENHYDRAMINE HCL 25 MG
50 CAPSULE ORAL ONCE
Status: COMPLETED | OUTPATIENT
Start: 2019-01-11 | End: 2019-01-11

## 2019-01-11 RX ORDER — DIPHENHYDRAMINE HYDROCHLORIDE 50 MG/ML
50 INJECTION, SOLUTION INTRAMUSCULAR; INTRAVENOUS ONCE
Status: DISCONTINUED | OUTPATIENT
Start: 2019-01-11 | End: 2019-01-11 | Stop reason: CLARIF

## 2019-01-11 RX ORDER — SODIUM CHLORIDE 0.9 % (FLUSH) 0.9 %
10 SYRINGE (ML) INJECTION AS NEEDED
Status: ACTIVE | OUTPATIENT
Start: 2019-01-11 | End: 2019-01-11

## 2019-01-11 RX ORDER — SODIUM CHLORIDE 9 MG/ML
25 INJECTION, SOLUTION INTRAVENOUS CONTINUOUS
Status: DISPENSED | OUTPATIENT
Start: 2019-01-11 | End: 2019-01-11

## 2019-01-11 RX ORDER — DEXAMETHASONE SODIUM PHOSPHATE 4 MG/ML
10 INJECTION, SOLUTION INTRA-ARTICULAR; INTRALESIONAL; INTRAMUSCULAR; INTRAVENOUS; SOFT TISSUE ONCE
Status: COMPLETED | OUTPATIENT
Start: 2019-01-11 | End: 2019-01-11

## 2019-01-11 RX ORDER — SODIUM CHLORIDE 9 MG/ML
10 INJECTION INTRAMUSCULAR; INTRAVENOUS; SUBCUTANEOUS AS NEEDED
Status: ACTIVE | OUTPATIENT
Start: 2019-01-11 | End: 2019-01-11

## 2019-01-11 RX ADMIN — Medication 10 ML: at 11:26

## 2019-01-11 RX ADMIN — DIPHENHYDRAMINE HYDROCHLORIDE 50 MG: 25 CAPSULE ORAL at 12:32

## 2019-01-11 RX ADMIN — PACLITAXEL 144 MG: 6 INJECTION, SOLUTION INTRAVENOUS at 13:32

## 2019-01-11 RX ADMIN — Medication 10 ML: at 14:37

## 2019-01-11 RX ADMIN — Medication 500 UNITS: at 14:37

## 2019-01-11 RX ADMIN — FAMOTIDINE 20 MG: 10 INJECTION, SOLUTION INTRAVENOUS at 12:34

## 2019-01-11 RX ADMIN — DEXAMETHASONE SODIUM PHOSPHATE 10 MG: 4 INJECTION, SOLUTION INTRA-ARTICULAR; INTRALESIONAL; INTRAMUSCULAR; INTRAVENOUS; SOFT TISSUE at 12:35

## 2019-01-11 RX ADMIN — SODIUM CHLORIDE 25 ML/HR: 900 INJECTION, SOLUTION INTRAVENOUS at 12:32

## 2019-01-11 NOTE — PROGRESS NOTES
Pt arrived to [facility] ambulatory in no acute distress at 1115 for Taxol C1.  Assessment unremarkable except burning hands. R chest port accessed without issue and positive blood return noted.  Labs obtained, CBCap, CMP. Visit Vitals  /64 (BP 1 Location: Left arm, BP Patient Position: Sitting)   Pulse 98   Temp 98 °F (36.7 °C)   Resp 18   Ht 5' 5\" (1.651 m)   Wt 69 kg (152 lb 3.2 oz)   SpO2 100%   BMI 25.33 kg/m²     Recent Results (from the past 12 hour(s))   CBC WITH 3 PART DIFF    Collection Time: 01/11/19 11:22 AM   Result Value Ref Range    WBC 13.3 (H) 3.6 - 11.0 K/uL    RBC 2.94 (L) 3.80 - 5.20 M/uL    HGB 9.2 (L) 11.5 - 16.0 g/dL    HCT 26.5 (L) 35.0 - 47.0 %    MCV 90.1 80.0 - 99.0 FL    MCH 31.3 26.0 - 34.0 PG    MCHC 34.7 30.0 - 36.5 g/dL    RDW 15.8 11.8 - 15.8 %    PLATELET 992 250 - 829 K/uL    NEUTROPHILS 97 (H) 32 - 75 %    MIXED CELLS 1 (L) 3.2 - 16.9 %    LYMPHOCYTES 2 (L) 12 - 49 %    ABS. NEUTROPHILS 12.8 (H) 1.8 - 8.0 K/UL    ABS. MIXED CELLS 0.2 0.2 - 1.2 K/uL    ABS. LYMPHOCYTES 0.3 (L) 0.8 - 3.5 K/UL    DF AUTOMATED     METABOLIC PANEL, COMPREHENSIVE    Collection Time: 01/11/19 11:31 AM   Result Value Ref Range    Sodium 137 136 - 145 mmol/L    Potassium 3.5 3.5 - 5.1 mmol/L    Chloride 105 97 - 108 mmol/L    CO2 25 21 - 32 mmol/L    Anion gap 7 5 - 15 mmol/L    Glucose 126 (H) 65 - 100 mg/dL    BUN 9 6 - 20 MG/DL    Creatinine 0.48 (L) 0.55 - 1.02 MG/DL    BUN/Creatinine ratio 19 12 - 20      GFR est AA >60 >60 ml/min/1.73m2    GFR est non-AA >60 >60 ml/min/1.73m2    Calcium 8.6 8.5 - 10.1 MG/DL    Bilirubin, total 0.2 0.2 - 1.0 MG/DL    ALT (SGPT) 20 12 - 78 U/L    AST (SGOT) 16 15 - 37 U/L    Alk.  phosphatase 115 45 - 117 U/L    Protein, total 7.1 6.4 - 8.2 g/dL    Albumin 3.5 3.5 - 5.0 g/dL    Globulin 3.6 2.0 - 4.0 g/dL    A-G Ratio 1.0 (L) 1.1 - 2.2       The following medications administered:  Benadryl 50mg PO  [email or web address]  Pepcid 20mg IVP  Decadron 10mg IVP  Taxol 144mg IV over 1 hour    Visit Vitals  /80   Pulse 87   Temp 98 °F (36.7 °C)   Resp 18   Ht 5' 5\" (1.651 m)   Wt 69 kg (152 lb 3.2 oz)   SpO2 100%   BMI 25.33 kg/m²     Pt tolerated treatment well. Discharge instructions reviewed, handout given. Port flushed per policy and de-accessed, 2x2 and tape placed.  Pt discharged ambulatory in no acute distress at 1440, accompanied by family. Next appointment 1/18/19 at 1100.

## 2019-01-11 NOTE — PROGRESS NOTES
[employer]  Social Work Navigator Encounter     Patient Name:  Tom Vallejo    Medical History: dx breast cancer    Advance Directives:    Narrative: SW was requested to resent FMLA form to Shante Bills [phone]    Who stated her fax was [phone]. She was able to locate the FMLA I had sent to her. And emailed it to me for our records. She also stated spouse was asking to be off from Jan 7-25th -  SW advised we could not certify for that and she asked for an email response. SW also called and spoke with pt and advised her. Pt confirmed spouse had not been to work this week. SW advised we could not certify, pt stated I will tell him he needs to go back to work.       Hi:    Please provide the certification for [employer] FMLA leave from 1/7/19 to 1/25/19    Thanks    [email or web address]  Barriers to Care:     Plan:

## 2019-01-11 NOTE — PROGRESS NOTES
[address] [facility]  [address], [address] [facility] [address] [place], [address] [facility]  [phone]      Follow-up Note        Patient: Troy Ramon MRN: [id number]  SSN: [id number]    YOB: 1970  Age: 57 y.o. Sex: female      Diagnosis:     1. Left breast carcinoma:  T1b N3a M0 (Stage IIIC) infiltrating ductal carcinoma, Tumor size 0.8 mm, LN +ve, grade 3, ER 0, [place] 15%, Her 2 -ve     Treatment:     1. Neoadjuvant chemotherapy   Adriamycin + Cytoxan - s/p 4 cycles - completed 12/26/2108              Taxol #1 of 12     Subjective:      Troy Ramon is a 57 y.o. female with a new diagnosis of left sided invasive breast carcinoma. She underwent a screening mammogram and was noted to have a density in the upper inner quadrant of the left breast. A biopsy of the lesion showed a diagnosis of invasive breast carcinoma ER -ve [place] 15% and Her 2 negative. An MRI did not reveal any additional abnormalities. She has an enlarged supraclavicular LN on the same side which biopsy showed invasive breast cancer. She is receiving neoadjuvant chemotherapy and is tolerating it fairly well. She continues to have fatigue. She also is complaining of peeling skin on her hands.       Review of Systems:    Constitutional: fatigue  Eyes: negative  Ears, Nose, Mouth, Throat, and Face: negative  Respiratory: negative  Cardiovascular: negative  Gastrointestinal: nausea  Genitourinary:negative  Integument/Breast: skin peeling on both hands  Hematologic/Lymphatic: palpable left neck mass  Musculoskeletal:negative  Neurological: negative        Past Medical History:   Diagnosis Date    Frequent headaches     Migraine     22 years     Past Surgical History:   Procedure Laterality Date    HX DILATION AND CURETTAGE      2010      Family History   Problem Relation Age of Onset    Headache Mother     Headache Maternal Aunt      Social History     Tobacco Use    Smoking status: Never Smoker    Smokeless tobacco: Never Used   Substance Use Topics    Alcohol use: No     Comment: social      Prior to Admission medications    Medication Sig Start Date End Date Taking? Authorizing Provider   dexamethasone (DECADRON) 4 mg tablet Take 20 mg by mouth every seven (7) days. 12/26/18   Brain Zamora NP   ondansetron (ZOFRAN ODT) 4 mg disintegrating tablet Take 1 Tab by mouth every eight (8) hours as needed for Nausea. 12/26/18   Yrn Levin MD   amoxicillin-clavulanate (AUGMENTIN) 500-125 mg per tablet TK 1 T PO BID 12/7/18   Provider, Historical   VIRTUSSIN AC  mg/5 mL solution TK 5ML TO 10ML PO Q 4 H PRF COUGH 12/7/18   Provider, Historical   oxyCODONE-acetaminophen (PERCOCET) 5-325 mg per tablet Take 1 Tab by mouth every four (4) hours as needed for Pain. Max Daily Amount: 6 Tabs. 11/8/18   Deborah Marquez MD   prochlorperazine (COMPAZINE) 10 mg tablet Take 1 Tab by mouth every six (6) hours as needed for up to 30 doses. 11/2/18   Franco Lennox, NP   lidocaine-prilocaine (EMLA) topical cream Apply  to affected area as needed for Pain. 11/2/18   Franco Lennox, NP   MULTIVITAMIN PO Take  by mouth. Provider, Historical   ergocalciferol, vitamin D2, (VITAMIN D2 PO) Take  by mouth. Provider, Historical   SUMAtriptan succinate (ZEMBRACE SYMTOUCH) 3 mg/0.5 mL pnij 1 Syringe by SubCUTAneous route as needed. 1 at HA onset and repeat q 1 hour until WHELAN relieved or until used 4 in 24 hours 10/7/18   Carlos Felton NP   cyclobenzaprine (FLEXERIL) 10 mg tablet TK 1 T PO TID PRN 3/13/18   Provider, Historical   SUMAtriptan (IMITREX) 100 mg tablet Take 1tab at the onset of HA Repeat in 2 hours. MAX 2 tabs  in 24hrs 3/22/18   Carlos Felton NP          Allergies   Allergen Reactions    Latex Itching     Skin gets very dry         Objective:      Wt Readings from Last 3 Encounters:   01/11/19 152 lb 3.2 oz (69 kg)   12/28/18 153 lb 8 oz (69.6 kg)   12/26/18 152 lb 14.4 oz (69.4 kg) Temp Readings from Last 3 Encounters:   01/11/19 98 °F (36.7 °C)   12/27/18 97.7 °F (36.5 °C)   12/26/18 98 °F (36.7 °C)     BP Readings from Last 3 Encounters:   01/11/19 115/64   12/28/18 105/63   12/27/18 116/73     Pulse Readings from Last 3 Encounters:   01/11/19 98   12/28/18 90   12/27/18 80           Physical Exam:    GENERAL: alert, cooperative, no distress, appears stated age  EYE: negative  LYMPHATIC: palpable left supraclavicular/lower cervical node feels smaller in size  THROAT & NECK: normal and no erythema or exudates noted. LUNG: clear to auscultation bilaterally  HEART: regular rate and rhythm  ABDOMEN: soft, non-tender  EXTREMITIES:  no edema  SKIN: bilateral hands red with areas of excoriated skin   NEUROLOGIC: negative      Lab Results   Component Value Date/Time    WBC 13.3 (H) 01/11/2019 11:22 AM    HGB 9.2 (L) 01/11/2019 11:22 AM    HCT 26.5 (L) 01/11/2019 11:22 AM    PLATELET 567 30/93/7374 11:22 AM    MCV 90.1 01/11/2019 11:22 AM       Lab Results   Component Value Date/Time    Sodium 139 12/26/2018 01:28 PM    Potassium 3.8 12/26/2018 01:28 PM    Chloride 107 12/26/2018 01:28 PM    CO2 27 12/26/2018 01:28 PM    Anion gap 5 12/26/2018 01:28 PM    Glucose 93 12/26/2018 01:28 PM    BUN 8 12/26/2018 01:28 PM    Creatinine 0.46 (L) 12/26/2018 01:28 PM    BUN/Creatinine ratio 17 12/26/2018 01:28 PM    GFR est AA >60 12/26/2018 01:28 PM    GFR est non-AA >60 12/26/2018 01:28 PM    Calcium 8.4 (L) 12/26/2018 01:28 PM    Bilirubin, total 0.1 (L) 12/26/2018 01:28 PM    AST (SGOT) 12 (L) 12/26/2018 01:28 PM    Alk. phosphatase 115 12/26/2018 01:28 PM    Protein, total 6.6 12/26/2018 01:28 PM    Albumin 3.1 (L) 12/26/2018 01:28 PM    Globulin 3.5 12/26/2018 01:28 PM    A-G Ratio 0.9 (L) 12/26/2018 01:28 PM    ALT (SGPT) 29 12/26/2018 01:28 PM         Assessment:     1.  Left breast carcinoma:  T1b N3a M0 (Stage IIIC) infiltrating ductal carcinoma, Tumor size 0.8 mm, LN +ve, grade 3, ER 0, [place] 15%, Her 2 -ve      ECOG PS 0  Intent of Treatment - curative  Prognosis- excellent    LVEF - 54% - 11/9/2018. Repeat 1/4/2019 55%    PET - 11/7/2018 - mildly hypermetabolic left supraclavicular and left MICHAEL lymph nodes. Receiving neoadjuvant chemotherapy   Adriamycin/Cyclophosphamide completed 4 cycles - 12/26/2018             Taxol #1 of  #12    Tolerating treatment  A detailed system by system evaluation of side effect was performed to assess chemotherapy related toxicity. Some nausea - manageable   Fatigue - manageable  Blood counts are acceptable. Results reviewed with the patient. Symptom management form reviewed with patient. Responding to the treatment       2. Anemia related to chemotherapy     Observation      3. Chemotherapy induced nausea    Taking nausea meds  Reports nausea for 4-5 days after chemotherapy      4. Skin irritation    Bilaterally on hands  Increase lotion use with eucerin      Plan:       · Continue weekly taxol  · Moisturize hands liberally  · Dexamethasone 20 mg night before taxol  · Follow-up in 2 weeks      Signed by: Leticia Turcios MD                     January 11, 2019        CC. Dari Matute MD  CC.  Shruti Hanson MD

## 2019-01-11 NOTE — PROGRESS NOTES
Demi Corado is a 57 y.o. female here today for left sided breast cancer f/u. Dx 10/18. S/P 4 cycles of AC. Patient starting cycle 1 of Taxol today. VS stable. Patient denies pain. Good appetite. Patient denies N/V/D and constipation. Patient denies numbness and tingling. Patient denies mouth ulcers. Patient denies cough. Patient denies SOB. Darkening and peeling noted to back and palms of pt's hands. Visit Vitals  /64   Pulse 98   Temp 98 °F (36.7 °C) (Oral)   Resp 16   Ht 5' 5\" (1.651 m)   Wt 152 lb 3.2 oz (69 kg)   SpO2 100%   BMI 25.33 kg/m²           Health Maintenance Review: Patient reminded of \"due or due soon\" health maintenance. I have asked the patient to contact his/her primary care provider (PCP) for follow-up on his/her health maintenance.

## 2019-01-16 ENCOUNTER — APPOINTMENT (OUTPATIENT)
Dept: INFUSION THERAPY | Age: 49
End: 2019-01-16

## 2019-01-18 ENCOUNTER — HOSPITAL ENCOUNTER (OUTPATIENT)
Dept: INFUSION THERAPY | Age: 49
Discharge: HOME | End: 2019-01-18
Payer: COMMERCIAL

## 2019-01-18 VITALS
HEART RATE: 80 BPM | BODY MASS INDEX: 24.83 KG/M2 | TEMPERATURE: 98 F | HEIGHT: 65 IN | RESPIRATION RATE: 18 BRPM | WEIGHT: 149 LBS | DIASTOLIC BLOOD PRESSURE: 75 MMHG | SYSTOLIC BLOOD PRESSURE: 125 MMHG

## 2019-01-18 DIAGNOSIS — Z17.1 MALIGNANT NEOPLASM OF UPPER-INNER QUADRANT OF LEFT BREAST IN FEMALE, ESTROGEN RECEPTOR NEGATIVE (HCC): Primary | ICD-10-CM

## 2019-01-18 DIAGNOSIS — C50.212 MALIGNANT NEOPLASM OF UPPER-INNER QUADRANT OF LEFT BREAST IN FEMALE, ESTROGEN RECEPTOR NEGATIVE (HCC): Primary | ICD-10-CM

## 2019-01-18 LAB
BASO+EOS+MONOS # BLD AUTO: 0.2 K/UL (ref 0.2–1.2)
BASO+EOS+MONOS NFR BLD AUTO: 5 % (ref 3.2–16.9)
DIFFERENTIAL METHOD BLD: ABNORMAL
ERYTHROCYTE [DISTWIDTH] IN BLOOD BY AUTOMATED COUNT: 17.9 % (ref 11.8–15.8)
HCT VFR BLD AUTO: 26.5 % (ref 35–47)
HGB BLD-MCNC: 9 G/DL (ref 11.5–16)
LYMPHOCYTES # BLD: 0.2 K/UL (ref 0.8–3.5)
LYMPHOCYTES NFR BLD: 5 % (ref 12–49)
MCH RBC QN AUTO: 31.3 PG (ref 26–34)
MCHC RBC AUTO-ENTMCNC: 34 G/DL (ref 30–36.5)
MCV RBC AUTO: 92 FL (ref 80–99)
NEUTS SEG # BLD: 3.7 K/UL (ref 1.8–8)
NEUTS SEG NFR BLD: 90 % (ref 32–75)
PLATELET # BLD AUTO: 304 K/UL (ref 150–400)
RBC # BLD AUTO: 2.88 M/UL (ref 3.8–5.2)
WBC # BLD AUTO: 4.1 K/UL (ref 3.6–11)

## 2019-01-18 PROCEDURE — 85025 COMPLETE CBC W/AUTO DIFF WBC: CPT

## 2019-01-18 PROCEDURE — 74011250636 HC RX REV CODE- 250/636: Performed by: INTERNAL MEDICINE

## 2019-01-18 PROCEDURE — 96375 TX/PRO/DX INJ NEW DRUG ADDON: CPT

## 2019-01-18 PROCEDURE — 96413 CHEMO IV INFUSION 1 HR: CPT

## 2019-01-18 PROCEDURE — 74011000250 HC RX REV CODE- 250: Performed by: INTERNAL MEDICINE

## 2019-01-18 PROCEDURE — 77030012965 HC NDL HUBR BBMI -A

## 2019-01-18 PROCEDURE — 36415 COLL VENOUS BLD VENIPUNCTURE: CPT

## 2019-01-18 PROCEDURE — 74011250637 HC RX REV CODE- 250/637: Performed by: NURSE PRACTITIONER

## 2019-01-18 RX ORDER — SODIUM CHLORIDE 9 MG/ML
10 INJECTION INTRAMUSCULAR; INTRAVENOUS; SUBCUTANEOUS AS NEEDED
Status: ACTIVE | OUTPATIENT
Start: 2019-01-18 | End: 2019-01-18

## 2019-01-18 RX ORDER — DIPHENHYDRAMINE HCL 25 MG
50 CAPSULE ORAL ONCE
Status: COMPLETED | OUTPATIENT
Start: 2019-01-18 | End: 2019-01-18

## 2019-01-18 RX ORDER — HEPARIN 100 UNIT/ML
300-500 SYRINGE INTRAVENOUS AS NEEDED
Status: ACTIVE | OUTPATIENT
Start: 2019-01-18 | End: 2019-01-18

## 2019-01-18 RX ORDER — DEXAMETHASONE SODIUM PHOSPHATE 4 MG/ML
10 INJECTION, SOLUTION INTRA-ARTICULAR; INTRALESIONAL; INTRAMUSCULAR; INTRAVENOUS; SOFT TISSUE ONCE
Status: COMPLETED | OUTPATIENT
Start: 2019-01-18 | End: 2019-01-18

## 2019-01-18 RX ORDER — DIPHENHYDRAMINE HYDROCHLORIDE 50 MG/ML
50 INJECTION, SOLUTION INTRAMUSCULAR; INTRAVENOUS ONCE
Status: DISCONTINUED | OUTPATIENT
Start: 2019-01-18 | End: 2019-01-18 | Stop reason: CLARIF

## 2019-01-18 RX ORDER — SODIUM CHLORIDE 9 MG/ML
25 INJECTION, SOLUTION INTRAVENOUS CONTINUOUS
Status: DISPENSED | OUTPATIENT
Start: 2019-01-18 | End: 2019-01-18

## 2019-01-18 RX ORDER — SODIUM CHLORIDE 0.9 % (FLUSH) 0.9 %
10 SYRINGE (ML) INJECTION AS NEEDED
Status: ACTIVE | OUTPATIENT
Start: 2019-01-18 | End: 2019-01-18

## 2019-01-18 RX ADMIN — Medication 10 ML: at 14:20

## 2019-01-18 RX ADMIN — Medication 20 ML: at 11:47

## 2019-01-18 RX ADMIN — SODIUM CHLORIDE 25 ML/HR: 900 INJECTION, SOLUTION INTRAVENOUS at 12:10

## 2019-01-18 RX ADMIN — DEXAMETHASONE SODIUM PHOSPHATE 10 MG: 4 INJECTION, SOLUTION INTRA-ARTICULAR; INTRALESIONAL; INTRAMUSCULAR; INTRAVENOUS; SOFT TISSUE at 12:11

## 2019-01-18 RX ADMIN — PACLITAXEL 144 MG: 6 INJECTION, SOLUTION INTRAVENOUS at 13:12

## 2019-01-18 RX ADMIN — SODIUM CHLORIDE 10 ML: 9 INJECTION, SOLUTION INTRAMUSCULAR; INTRAVENOUS; SUBCUTANEOUS at 11:47

## 2019-01-18 RX ADMIN — Medication 500 UNITS: at 14:20

## 2019-01-18 RX ADMIN — FAMOTIDINE 20 MG: 10 INJECTION, SOLUTION INTRAVENOUS at 12:11

## 2019-01-18 RX ADMIN — DIPHENHYDRAMINE HYDROCHLORIDE 50 MG: 25 CAPSULE ORAL at 12:10

## 2019-01-18 NOTE — PROGRESS NOTES
[facility] - Chemotherapy Progress Note    9558  Pt admit to [facility] for C2 Taxol ambulatory in stable condition. Assessment completed. No new concerns voiced, continues with intermittent neuropathy to hands. Right chest port accessed with 20 gauge 0.75 inch ramirez needle with positive blood return. Labs drawn per order and sent. Recent Results (from the past 12 hour(s))   CBC WITH 3 PART DIFF    Collection Time: 01/18/19 11:46 AM   Result Value Ref Range    WBC 4.1 3.6 - 11.0 K/uL    RBC 2.88 (L) 3.80 - 5.20 M/uL    HGB 9.0 (L) 11.5 - 16.0 g/dL    HCT 26.5 (L) 35.0 - 47.0 %    MCV 92.0 80.0 - 99.0 FL    MCH 31.3 26.0 - 34.0 PG    MCHC 34.0 30.0 - 36.5 g/dL    RDW 17.9 (H) 11.8 - 15.8 %    PLATELET 907 915 - 519 K/uL    NEUTROPHILS 90 (H) 32 - 75 %    MIXED CELLS 5 3.2 - 16.9 %    LYMPHOCYTES 5 (L) 12 - 49 %    ABS. NEUTROPHILS 3.7 1.8 - 8.0 K/UL    ABS. MIXED CELLS 0.2 0.2 - 1.2 K/uL    ABS. LYMPHOCYTES 0.2 (L) 0.8 - 3.5 K/UL    DF AUTOMATED         Labs within treatment parameters. Medications:  NS  Benadryl PO  Pepcid IV  Decadron IV  Taxol IV    Patient Vitals for the past 12 hrs:   Temp Pulse Resp BP   01/18/19 1421 98 °F (36.7 °C) 80 18 125/75   01/18/19 1130 97.5 °F (36.4 °C) 94 18 119/73     1425  Pt tolerated treatment well. Positive blood return pre and post infusion. Port flushed and deaccessed per Qiro. D/c home ambulatory in no distress.  Pt aware of next [facility] appointment scheduled for 1/25/19 at 11 am.

## 2019-01-23 ENCOUNTER — APPOINTMENT (OUTPATIENT)
Dept: INFUSION THERAPY | Age: 49
End: 2019-01-23

## 2019-01-25 ENCOUNTER — OFFICE VISIT (OUTPATIENT)
Dept: ONCOLOGY | Age: 49
End: 2019-01-25

## 2019-01-25 ENCOUNTER — HOSPITAL ENCOUNTER (OUTPATIENT)
Dept: INFUSION THERAPY | Age: 49
Discharge: HOME | End: 2019-01-25
Payer: COMMERCIAL

## 2019-01-25 VITALS
WEIGHT: 149.1 LBS | SYSTOLIC BLOOD PRESSURE: 119 MMHG | OXYGEN SATURATION: 100 % | HEIGHT: 65 IN | BODY MASS INDEX: 24.84 KG/M2 | TEMPERATURE: 98.2 F | RESPIRATION RATE: 18 BRPM | HEART RATE: 95 BPM | DIASTOLIC BLOOD PRESSURE: 76 MMHG

## 2019-01-25 VITALS
RESPIRATION RATE: 18 BRPM | OXYGEN SATURATION: 100 % | TEMPERATURE: 98.2 F | DIASTOLIC BLOOD PRESSURE: 60 MMHG | HEIGHT: 65 IN | HEART RATE: 83 BPM | SYSTOLIC BLOOD PRESSURE: 97 MMHG | WEIGHT: 149.1 LBS | BODY MASS INDEX: 24.84 KG/M2

## 2019-01-25 DIAGNOSIS — D64.81 ANEMIA ASSOCIATED WITH CHEMOTHERAPY: ICD-10-CM

## 2019-01-25 DIAGNOSIS — T45.1X5A ANEMIA ASSOCIATED WITH CHEMOTHERAPY: ICD-10-CM

## 2019-01-25 DIAGNOSIS — Z17.1 MALIGNANT NEOPLASM OF UPPER-INNER QUADRANT OF LEFT BREAST IN FEMALE, ESTROGEN RECEPTOR NEGATIVE (HCC): Primary | ICD-10-CM

## 2019-01-25 DIAGNOSIS — C50.212 MALIGNANT NEOPLASM OF UPPER-INNER QUADRANT OF LEFT BREAST IN FEMALE, ESTROGEN RECEPTOR NEGATIVE (HCC): Primary | ICD-10-CM

## 2019-01-25 LAB
BASO+EOS+MONOS # BLD AUTO: 0.1 K/UL (ref 0.2–1.2)
BASO+EOS+MONOS NFR BLD AUTO: 7 % (ref 3.2–16.9)
DIFFERENTIAL METHOD BLD: ABNORMAL
ERYTHROCYTE [DISTWIDTH] IN BLOOD BY AUTOMATED COUNT: 19.4 % (ref 11.8–15.8)
HCT VFR BLD AUTO: 29 % (ref 35–47)
HGB BLD-MCNC: 9.7 G/DL (ref 11.5–16)
LYMPHOCYTES # BLD: 0.1 K/UL (ref 0.8–3.5)
LYMPHOCYTES NFR BLD: 8 % (ref 12–49)
MCH RBC QN AUTO: 31.8 PG (ref 26–34)
MCHC RBC AUTO-ENTMCNC: 33.4 G/DL (ref 30–36.5)
MCV RBC AUTO: 95.1 FL (ref 80–99)
NEUTS SEG # BLD: 1 K/UL (ref 1.8–8)
NEUTS SEG NFR BLD: 85 % (ref 32–75)
PLATELET # BLD AUTO: 223 K/UL (ref 150–400)
RBC # BLD AUTO: 3.05 M/UL (ref 3.8–5.2)
WBC # BLD AUTO: 1.2 K/UL (ref 3.6–11)

## 2019-01-25 PROCEDURE — 96375 TX/PRO/DX INJ NEW DRUG ADDON: CPT

## 2019-01-25 PROCEDURE — 36415 COLL VENOUS BLD VENIPUNCTURE: CPT

## 2019-01-25 PROCEDURE — 96413 CHEMO IV INFUSION 1 HR: CPT

## 2019-01-25 PROCEDURE — 74011250636 HC RX REV CODE- 250/636: Performed by: INTERNAL MEDICINE

## 2019-01-25 PROCEDURE — 74011000250 HC RX REV CODE- 250: Performed by: INTERNAL MEDICINE

## 2019-01-25 PROCEDURE — 77030012965 HC NDL HUBR BBMI -A

## 2019-01-25 PROCEDURE — 74011250637 HC RX REV CODE- 250/637: Performed by: INTERNAL MEDICINE

## 2019-01-25 PROCEDURE — 85025 COMPLETE CBC W/AUTO DIFF WBC: CPT

## 2019-01-25 RX ORDER — DIPHENHYDRAMINE HCL 25 MG
50 CAPSULE ORAL ONCE
Status: COMPLETED | OUTPATIENT
Start: 2019-01-25 | End: 2019-01-25

## 2019-01-25 RX ORDER — HEPARIN 100 UNIT/ML
300-500 SYRINGE INTRAVENOUS AS NEEDED
Status: ACTIVE | OUTPATIENT
Start: 2019-01-25 | End: 2019-01-25

## 2019-01-25 RX ORDER — DEXAMETHASONE SODIUM PHOSPHATE 4 MG/ML
10 INJECTION, SOLUTION INTRA-ARTICULAR; INTRALESIONAL; INTRAMUSCULAR; INTRAVENOUS; SOFT TISSUE ONCE
Status: COMPLETED | OUTPATIENT
Start: 2019-01-25 | End: 2019-01-25

## 2019-01-25 RX ORDER — DIPHENHYDRAMINE HYDROCHLORIDE 50 MG/ML
50 INJECTION, SOLUTION INTRAMUSCULAR; INTRAVENOUS ONCE
Status: DISCONTINUED | OUTPATIENT
Start: 2019-01-25 | End: 2019-01-25 | Stop reason: CLARIF

## 2019-01-25 RX ORDER — SODIUM CHLORIDE 9 MG/ML
10 INJECTION INTRAMUSCULAR; INTRAVENOUS; SUBCUTANEOUS AS NEEDED
Status: ACTIVE | OUTPATIENT
Start: 2019-01-25 | End: 2019-01-25

## 2019-01-25 RX ORDER — SODIUM CHLORIDE 0.9 % (FLUSH) 0.9 %
10 SYRINGE (ML) INJECTION AS NEEDED
Status: ACTIVE | OUTPATIENT
Start: 2019-01-25 | End: 2019-01-25

## 2019-01-25 RX ORDER — SODIUM CHLORIDE 9 MG/ML
25 INJECTION, SOLUTION INTRAVENOUS CONTINUOUS
Status: DISPENSED | OUTPATIENT
Start: 2019-01-25 | End: 2019-01-25

## 2019-01-25 RX ADMIN — SODIUM CHLORIDE 10 ML: 9 INJECTION, SOLUTION INTRAMUSCULAR; INTRAVENOUS; SUBCUTANEOUS at 11:43

## 2019-01-25 RX ADMIN — PACLITAXEL 144 MG: 6 INJECTION, SOLUTION INTRAVENOUS at 13:39

## 2019-01-25 RX ADMIN — DIPHENHYDRAMINE HYDROCHLORIDE 50 MG: 25 CAPSULE ORAL at 12:18

## 2019-01-25 RX ADMIN — FAMOTIDINE 20 MG: 10 INJECTION, SOLUTION INTRAVENOUS at 13:20

## 2019-01-25 RX ADMIN — DEXAMETHASONE SODIUM PHOSPHATE 10 MG: 4 INJECTION, SOLUTION INTRA-ARTICULAR; INTRALESIONAL; INTRAMUSCULAR; INTRAVENOUS; SOFT TISSUE at 12:21

## 2019-01-25 RX ADMIN — Medication 10 ML: at 14:45

## 2019-01-25 RX ADMIN — Medication 500 UNITS: at 14:45

## 2019-01-25 RX ADMIN — SODIUM CHLORIDE 25 ML/HR: 900 INJECTION, SOLUTION INTRAVENOUS at 12:19

## 2019-01-25 RX ADMIN — Medication 10 ML: at 11:43

## 2019-01-25 NOTE — PROGRESS NOTES
Pt arrived to [facility] for Taxol C3 in no acute distress at 1130.  Assessment unremarkable except numbness in fingers and skin peeling on hands. R chest port accessed without issue and positive blood return noted.  Labs obtained- CBCap. Visit Vitals  /76 (BP 1 Location: Left arm, BP Patient Position: Sitting)   Pulse 95   Temp 98.2 °F (36.8 °C)   Resp 18   Ht 5' 5\" (1.651 m)   Wt 67.6 kg (149 lb 1.6 oz)   SpO2 100%   BMI 24.81 kg/m²     Recent Results (from the past 12 hour(s))   CBC WITH 3 PART DIFF    Collection Time: 01/25/19 11:41 AM   Result Value Ref Range    WBC 1.2 (L) 3.6 - 11.0 K/uL    RBC 3.05 (L) 3.80 - 5.20 M/uL    HGB 9.7 (L) 11.5 - 16.0 g/dL    HCT 29.0 (L) 35.0 - 47.0 %    MCV 95.1 80.0 - 99.0 FL    MCH 31.8 26.0 - 34.0 PG    MCHC 33.4 30.0 - 36.5 g/dL    RDW 19.4 (H) 11.8 - 15.8 %    PLATELET 649 909 - 339 K/uL    NEUTROPHILS 85 (H) 32 - 75 %    MIXED CELLS 7 3.2 - 16.9 %    LYMPHOCYTES 8 (L) 12 - 49 %    ABS. NEUTROPHILS 1.0 (L) 1.8 - 8.0 K/UL    ABS. MIXED CELLS 0.1 (L) 0.2 - 1.2 K/uL    ABS. LYMPHOCYTES 0.1 (L) 0.8 - 3.5 K/UL    DF AUTOMATED         The following medications administered:  NS @ KVO  Benadryl 50 mg PO  Decadron 10 mg IVP  Pepcid 20 mg IVP  Taxol 144 mg IV over 1 hour    Visit Vitals  BP 97/60 (BP 1 Location: Left arm, BP Patient Position: Lying left side)   Pulse 83   Temp 98.2 °F (36.8 °C)   Resp 18   Ht 5' 5\" (1.651 m)   Wt 67.6 kg (149 lb 1.6 oz)   SpO2 100%   BMI 24.81 kg/m²       Pt tolerated treatment well.  No adverse reaction noted. Port flushed per policy and needle removed, 2x2 and paper tape placed.  Pt discharged ambulatory in no acute distress at 1450, accompanied by family member. Next appointment 2/1/19 @ 1100.

## 2019-01-25 NOTE — PROGRESS NOTES
Shmuel Rodriguez a 57 y.o. female here today for left sided breast cancer f/u. . S/P 4 cycles of AC. Pt. receiving  Taxol; cycle 3 today. VS stable. Good appetite. Patient denies N/V/D and constipation. Patient denies mouth ulcers. Patient denies cough. Patient denies SOB. Patient reports numbness, tingling, pain and redness to palms of hands; pt states \"It feels like a chemical peel\". Patient also reports migraines and break through vaginal bleeding & cramping since she started the Taxol. Visit Vitals  /76   Pulse 95   Temp 98.2 °F (36.8 °C) (Oral)   Resp 18   Ht 5' 5\" (1.651 m)   Wt 149 lb 1.6 oz (67.6 kg)   SpO2 100%   BMI 24.81 kg/m²     Health Maintenance Review: Patient reminded of \"due or due soon\" health maintenance. I have asked the patient to contact his/her primary care provider (PCP) for follow-up on his/her health maintenance.

## 2019-01-25 NOTE — PROGRESS NOTES
[address], [address] [place] [address] Be [place] La Rosa, [address] [facility]  [phone]      Follow-up Note        Patient: Bekah Pyle MRN: [id number]  SSN: [id number]    YOB: 1970  Age: 57 y.o. Sex: female      Diagnosis:     1. Left breast carcinoma:  T1b N3a M0 (Stage IIIC) infiltrating ductal carcinoma, Tumor size 0.8 mm, LN +ve, grade 3, ER 0, IA 15%, Her 2 -ve     Treatment:     1. Neoadjuvant chemotherapy   Adriamycin + Cytoxan - s/p 4 cycles - completed 12/26/2108              Taxol #3 of 12     Subjective:      Bekah Pyle is a 57 y.o. female with a new diagnosis of left sided invasive breast carcinoma. She underwent a screening mammogram and was noted to have a density in the upper inner quadrant of the left breast. A biopsy of the lesion showed a diagnosis of invasive breast carcinoma ER -ve IA 15% and Her 2 negative. An MRI did not reveal any additional abnormalities. She has an enlarged supraclavicular LN on the same side which biopsy showed invasive breast cancer. She is receiving neoadjuvant chemotherapy and is tolerating it fairly well. She continues to have fatigue.        Review of Systems:    Constitutional: fatigue  Eyes: negative  Ears, Nose, Mouth, Throat, and Face: negative  Respiratory: negative  Cardiovascular: negative  Gastrointestinal: nausea  Genitourinary:negative  Integument/Breast: skin peeling on both hands  Hematologic/Lymphatic: palpable left neck mass  Musculoskeletal:negative  Neurological: negative        Past Medical History:   Diagnosis Date    Frequent headaches     Migraine     22 years     Past Surgical History:   Procedure Laterality Date    HX DILATION AND CURETTAGE      2010      Family History   Problem Relation Age of Onset    Headache Mother     Headache Maternal Aunt      Social History     Tobacco Use    Smoking status: Never Smoker    Smokeless tobacco: Never Used Substance Use Topics    Alcohol use: No     Comment: social      Prior to Admission medications    Medication Sig Start Date End Date Taking? Authorizing Provider   dexamethasone (DECADRON) 4 mg tablet Take 20 mg by mouth every seven (7) days. 12/26/18   Akosua Zamora NP   ondansetron (ZOFRAN ODT) 4 mg disintegrating tablet Take 1 Tab by mouth every eight (8) hours as needed for Nausea. 12/26/18   Nura Rubalcava MD   VIRTUSSIN AC  mg/5 mL solution TK 5ML TO 10ML PO Q 4 H PRF COUGH 12/7/18   Provider, Historical   oxyCODONE-acetaminophen (PERCOCET) 5-325 mg per tablet Take 1 Tab by mouth every four (4) hours as needed for Pain. Max Daily Amount: 6 Tabs. 11/8/18   Zoraida Ellis MD   prochlorperazine (COMPAZINE) 10 mg tablet Take 1 Tab by mouth every six (6) hours as needed for up to 30 doses. 11/2/18   Mahesh Waters NP   lidocaine-prilocaine (EMLA) topical cream Apply  to affected area as needed for Pain. 11/2/18   Mahesh Waters NP   MULTIVITAMIN PO Take  by mouth. Provider, Historical   ergocalciferol, vitamin D2, (VITAMIN D2 PO) Take  by mouth. Provider, Historical   SUMAtriptan succinate (ZEMBRACE SYMTOUCH) 3 mg/0.5 mL pnij 1 Syringe by SubCUTAneous route as needed. 1 at HA onset and repeat q 1 hour until WHELAN relieved or until used 4 in 24 hours 10/7/18   Eliu Etienne NP   cyclobenzaprine (FLEXERIL) 10 mg tablet TK 1 T PO TID PRN 3/13/18   Provider, Historical   SUMAtriptan (IMITREX) 100 mg tablet Take 1tab at the onset of HA Repeat in 2 hours. MAX 2 tabs  in 24hrs 3/22/18   Eliu Etienne NP          Allergies   Allergen Reactions    Latex Itching     Skin gets very dry         Objective:      Wt Readings from Last 3 Encounters:   01/25/19 149 lb 1.6 oz (67.6 kg)   01/25/19 149 lb 1.6 oz (67.6 kg)   01/18/19 149 lb (67.6 kg)     Temp Readings from Last 3 Encounters:   01/25/19 98.2 °F (36.8 °C) (Oral)   01/25/19 98.2 °F (36.8 °C)   01/18/19 98 °F (36.7 °C)     BP Readings from Last 3 Encounters:   01/25/19 119/76   01/25/19 97/60   01/18/19 125/75     Pulse Readings from Last 3 Encounters:   01/25/19 95   01/25/19 83   01/18/19 80           Physical Exam:    GENERAL: alert, cooperative, no distress, appears stated age  EYE: negative  LYMPHATIC: left supraclavicular/lower cervical node feels smaller in size  THROAT & NECK: normal and no erythema or exudates noted. LUNG: clear to auscultation bilaterally  HEART: regular rate and rhythm  ABDOMEN: soft, non-tender  EXTREMITIES:  no edema  SKIN: rash on palms of both hands  NEUROLOGIC: negative      Lab Results   Component Value Date/Time    WBC 1.2 (L) 01/25/2019 11:41 AM    HGB 9.7 (L) 01/25/2019 11:41 AM    HCT 29.0 (L) 01/25/2019 11:41 AM    PLATELET 714 04/92/9894 11:41 AM    MCV 95.1 01/25/2019 11:41 AM       Lab Results   Component Value Date/Time    Sodium 137 01/11/2019 11:31 AM    Potassium 3.5 01/11/2019 11:31 AM    Chloride 105 01/11/2019 11:31 AM    CO2 25 01/11/2019 11:31 AM    Anion gap 7 01/11/2019 11:31 AM    Glucose 126 (H) 01/11/2019 11:31 AM    BUN 9 01/11/2019 11:31 AM    Creatinine 0.48 (L) 01/11/2019 11:31 AM    BUN/Creatinine ratio 19 01/11/2019 11:31 AM    GFR est AA >60 01/11/2019 11:31 AM    GFR est non-AA >60 01/11/2019 11:31 AM    Calcium 8.6 01/11/2019 11:31 AM    Bilirubin, total 0.2 01/11/2019 11:31 AM    AST (SGOT) 16 01/11/2019 11:31 AM    Alk. phosphatase 115 01/11/2019 11:31 AM    Protein, total 7.1 01/11/2019 11:31 AM    Albumin 3.5 01/11/2019 11:31 AM    Globulin 3.6 01/11/2019 11:31 AM    A-G Ratio 1.0 (L) 01/11/2019 11:31 AM    ALT (SGPT) 20 01/11/2019 11:31 AM         Assessment:     1. Left breast carcinoma:  T1b N3a M0 (Stage IIIC) infiltrating ductal carcinoma, Tumor size 0.8 mm, LN +ve, grade 3, ER 0, [place] 15%, Her 2 -ve      ECOG PS 0  Intent of Treatment - curative  Prognosis- excellent    LVEF - 54% - 11/9/2018.  Repeat 1/4/2019 55%    PET - 11/7/2018 - mildly hypermetabolic left supraclavicular and left MICHAEL lymph nodes. Receiving neoadjuvant chemotherapy   Adriamycin/Cyclophosphamide completed 4 cycles - 12/26/2018             Taxol #3 of  #12    Tolerating treatment  A detailed system by system evaluation of side effect was performed to assess chemotherapy related toxicity. Some nausea - manageable   Fatigue - manageable  Blood counts are acceptable. Results reviewed with the patient. Symptom management form reviewed with patient. Responding to the treatment       2. Anemia related to chemotherapy     Observation      3. Chemotherapy induced nausea    Taking nausea meds  Reports nausea for 4-5 days after chemotherapy      4. Skin irritation    Bilaterally on hands  Increase lotion use with eucerin      Plan:       · Continue weekly taxol  · Moisturize hands liberally  · Dexamethasone 20 mg night before taxol  · Follow-up in 2 weeks      Signed by: Praveen Hernandez MD                     January 28, 2019        CC. Carol Mota MD  CC.  Nazia Gonsalez MD

## 2019-01-30 ENCOUNTER — APPOINTMENT (OUTPATIENT)
Dept: INFUSION THERAPY | Age: 49
End: 2019-01-30

## 2019-02-01 ENCOUNTER — HOSPITAL ENCOUNTER (OUTPATIENT)
Dept: INFUSION THERAPY | Age: 49
Discharge: HOME | End: 2019-02-01
Payer: MEDICAID

## 2019-02-01 VITALS
WEIGHT: 148.3 LBS | RESPIRATION RATE: 18 BRPM | HEART RATE: 89 BPM | SYSTOLIC BLOOD PRESSURE: 122 MMHG | BODY MASS INDEX: 24.71 KG/M2 | DIASTOLIC BLOOD PRESSURE: 72 MMHG | OXYGEN SATURATION: 100 % | HEIGHT: 65 IN | TEMPERATURE: 98 F

## 2019-02-01 DIAGNOSIS — Z17.1 MALIGNANT NEOPLASM OF UPPER-INNER QUADRANT OF LEFT BREAST IN FEMALE, ESTROGEN RECEPTOR NEGATIVE (HCC): Primary | ICD-10-CM

## 2019-02-01 DIAGNOSIS — C50.212 MALIGNANT NEOPLASM OF UPPER-INNER QUADRANT OF LEFT BREAST IN FEMALE, ESTROGEN RECEPTOR NEGATIVE (HCC): Primary | ICD-10-CM

## 2019-02-01 LAB
BASO+EOS+MONOS # BLD AUTO: 0.1 K/UL (ref 0.2–1.2)
BASO+EOS+MONOS NFR BLD AUTO: 3 % (ref 3.2–16.9)
DIFFERENTIAL METHOD BLD: ABNORMAL
ERYTHROCYTE [DISTWIDTH] IN BLOOD BY AUTOMATED COUNT: 18.1 % (ref 11.8–15.8)
HCT VFR BLD AUTO: 29.5 % (ref 35–47)
HGB BLD-MCNC: 9.9 G/DL (ref 11.5–16)
LYMPHOCYTES # BLD: 0.2 K/UL (ref 0.8–3.5)
LYMPHOCYTES NFR BLD: 6 % (ref 12–49)
MCH RBC QN AUTO: 32 PG (ref 26–34)
MCHC RBC AUTO-ENTMCNC: 33.6 G/DL (ref 30–36.5)
MCV RBC AUTO: 95.5 FL (ref 80–99)
NEUTS SEG # BLD: 2.9 K/UL (ref 1.8–8)
NEUTS SEG NFR BLD: 92 % (ref 32–75)
PLATELET # BLD AUTO: 229 K/UL (ref 150–400)
RBC # BLD AUTO: 3.09 M/UL (ref 3.8–5.2)
WBC # BLD AUTO: 3.2 K/UL (ref 3.6–11)

## 2019-02-01 PROCEDURE — 74011250636 HC RX REV CODE- 250/636: Performed by: INTERNAL MEDICINE

## 2019-02-01 PROCEDURE — 74011000250 HC RX REV CODE- 250: Performed by: INTERNAL MEDICINE

## 2019-02-01 PROCEDURE — 85025 COMPLETE CBC W/AUTO DIFF WBC: CPT

## 2019-02-01 PROCEDURE — 96375 TX/PRO/DX INJ NEW DRUG ADDON: CPT

## 2019-02-01 PROCEDURE — 36415 COLL VENOUS BLD VENIPUNCTURE: CPT

## 2019-02-01 PROCEDURE — 74011250637 HC RX REV CODE- 250/637: Performed by: INTERNAL MEDICINE

## 2019-02-01 PROCEDURE — 96413 CHEMO IV INFUSION 1 HR: CPT

## 2019-02-01 PROCEDURE — 77030012965 HC NDL HUBR BBMI -A

## 2019-02-01 RX ORDER — SODIUM CHLORIDE 0.9 % (FLUSH) 0.9 %
10 SYRINGE (ML) INJECTION AS NEEDED
Status: ACTIVE | OUTPATIENT
Start: 2019-02-01 | End: 2019-02-01

## 2019-02-01 RX ORDER — SODIUM CHLORIDE 9 MG/ML
25 INJECTION, SOLUTION INTRAVENOUS CONTINUOUS
Status: DISPENSED | OUTPATIENT
Start: 2019-02-01 | End: 2019-02-01

## 2019-02-01 RX ORDER — SODIUM CHLORIDE 9 MG/ML
10 INJECTION INTRAMUSCULAR; INTRAVENOUS; SUBCUTANEOUS AS NEEDED
Status: ACTIVE | OUTPATIENT
Start: 2019-02-01 | End: 2019-02-01

## 2019-02-01 RX ORDER — DIPHENHYDRAMINE HCL 25 MG
50 CAPSULE ORAL ONCE
Status: COMPLETED | OUTPATIENT
Start: 2019-02-01 | End: 2019-02-01

## 2019-02-01 RX ORDER — DEXAMETHASONE SODIUM PHOSPHATE 4 MG/ML
10 INJECTION, SOLUTION INTRA-ARTICULAR; INTRALESIONAL; INTRAMUSCULAR; INTRAVENOUS; SOFT TISSUE ONCE
Status: COMPLETED | OUTPATIENT
Start: 2019-02-01 | End: 2019-02-01

## 2019-02-01 RX ORDER — HEPARIN 100 UNIT/ML
300-500 SYRINGE INTRAVENOUS AS NEEDED
Status: ACTIVE | OUTPATIENT
Start: 2019-02-01 | End: 2019-02-01

## 2019-02-01 RX ORDER — DIPHENHYDRAMINE HYDROCHLORIDE 50 MG/ML
50 INJECTION, SOLUTION INTRAMUSCULAR; INTRAVENOUS ONCE
Status: DISCONTINUED | OUTPATIENT
Start: 2019-02-01 | End: 2019-02-01

## 2019-02-01 RX ADMIN — DIPHENHYDRAMINE HYDROCHLORIDE 50 MG: 25 CAPSULE ORAL at 12:15

## 2019-02-01 RX ADMIN — Medication 10 ML: at 11:39

## 2019-02-01 RX ADMIN — FAMOTIDINE 20 MG: 10 INJECTION, SOLUTION INTRAVENOUS at 12:16

## 2019-02-01 RX ADMIN — Medication 10 ML: at 14:46

## 2019-02-01 RX ADMIN — PACLITAXEL 144 MG: 6 INJECTION, SOLUTION INTRAVENOUS at 13:40

## 2019-02-01 RX ADMIN — DEXAMETHASONE SODIUM PHOSPHATE 10 MG: 4 INJECTION, SOLUTION INTRA-ARTICULAR; INTRALESIONAL; INTRAMUSCULAR; INTRAVENOUS; SOFT TISSUE at 12:18

## 2019-02-01 RX ADMIN — Medication 500 UNITS: at 14:46

## 2019-02-01 RX ADMIN — SODIUM CHLORIDE 25 ML/HR: 900 INJECTION, SOLUTION INTRAVENOUS at 12:15

## 2019-02-01 NOTE — PROGRESS NOTES
Pt arrived to [facility] ambulatory in no acute distress at 1130 for Taxol C6.  Assessment unremarkable. R chest port accessed without issue and positive blood return noted.  Labs obtained, CBCap. Visit Vitals /74 (BP 1 Location: Left arm, BP Patient Position: Sitting) Pulse 99 Temp 98 °F (36.7 °C) Resp 18 Ht 5' 5\" (1.651 m) Wt 67.3 kg (148 lb 4.8 oz) SpO2 100% BMI 24.68 kg/m² Recent Results (from the past 12 hour(s)) CBC WITH 3 PART DIFF Collection Time: 02/01/19 11:36 AM  
Result Value Ref Range WBC 3.2 (L) 3.6 - 11.0 K/uL  
 RBC 3.09 (L) 3.80 - 5.20 M/uL HGB 9.9 (L) 11.5 - 16.0 g/dL HCT 29.5 (L) 35.0 - 47.0 % MCV 95.5 80.0 - 99.0 FL  
 MCH 32.0 26.0 - 34.0 PG  
 MCHC 33.6 30.0 - 36.5 g/dL  
 RDW 18.1 (H) 11.8 - 15.8 % PLATELET 450 729 - 712 K/uL NEUTROPHILS 92 (H) 32 - 75 % MIXED CELLS 3 (L) 3.2 - 16.9 % LYMPHOCYTES 6 (L) 12 - 49 % ABS. NEUTROPHILS 2.9 1.8 - 8.0 K/UL  
 ABS. MIXED CELLS 0.1 (L) 0.2 - 1.2 K/uL  
 ABS. LYMPHOCYTES 0.2 (L) 0.8 - 3.5 K/UL  
 DF AUTOMATED The following medications administered: 
Benadryl 50mg PO 
[email or web address] Pepcid 20mg IVP Decadron 10mg IVP Taxol 144mg IV over 1 hour Visit Vitals /72 Pulse 89 Temp 98 °F (36.7 °C) Resp 18 Ht 5' 5\" (1.651 m) Wt 67.3 kg (148 lb 4.8 oz) SpO2 100% BMI 24.68 kg/m² Pt tolerated treatment well. Port flushed per policy and de-accessed, 2x2 and tape placed.  Pt discharged ambulatory in no acute distress at 1450, accompanied by family. Next appointment 2/8/19 at 1100.

## 2019-02-08 ENCOUNTER — OFFICE VISIT (OUTPATIENT)
Dept: ONCOLOGY | Age: 49
End: 2019-02-08

## 2019-02-08 ENCOUNTER — HOSPITAL ENCOUNTER (OUTPATIENT)
Dept: INFUSION THERAPY | Age: 49
Discharge: HOME | End: 2019-02-08
Payer: MEDICAID

## 2019-02-08 VITALS
TEMPERATURE: 98.1 F | HEIGHT: 65 IN | HEART RATE: 98 BPM | SYSTOLIC BLOOD PRESSURE: 118 MMHG | BODY MASS INDEX: 24.49 KG/M2 | RESPIRATION RATE: 18 BRPM | WEIGHT: 147 LBS | OXYGEN SATURATION: 99 % | DIASTOLIC BLOOD PRESSURE: 80 MMHG

## 2019-02-08 VITALS
TEMPERATURE: 97.8 F | SYSTOLIC BLOOD PRESSURE: 133 MMHG | BODY MASS INDEX: 24.59 KG/M2 | WEIGHT: 147.6 LBS | RESPIRATION RATE: 18 BRPM | HEIGHT: 65 IN | DIASTOLIC BLOOD PRESSURE: 74 MMHG | HEART RATE: 98 BPM

## 2019-02-08 DIAGNOSIS — C50.212 MALIGNANT NEOPLASM OF UPPER-INNER QUADRANT OF LEFT BREAST IN FEMALE, ESTROGEN RECEPTOR NEGATIVE (HCC): Primary | ICD-10-CM

## 2019-02-08 DIAGNOSIS — Z17.1 MALIGNANT NEOPLASM OF UPPER-INNER QUADRANT OF LEFT BREAST IN FEMALE, ESTROGEN RECEPTOR NEGATIVE (HCC): Primary | ICD-10-CM

## 2019-02-08 DIAGNOSIS — T45.1X5A ANEMIA ASSOCIATED WITH CHEMOTHERAPY: ICD-10-CM

## 2019-02-08 DIAGNOSIS — D64.81 ANEMIA ASSOCIATED WITH CHEMOTHERAPY: ICD-10-CM

## 2019-02-08 LAB
BASO+EOS+MONOS # BLD AUTO: 0 K/UL (ref 0.2–1.2)
BASO+EOS+MONOS NFR BLD AUTO: 1 % (ref 3.2–16.9)
DIFFERENTIAL METHOD BLD: ABNORMAL
ERYTHROCYTE [DISTWIDTH] IN BLOOD BY AUTOMATED COUNT: 17.1 % (ref 11.8–15.8)
HCT VFR BLD AUTO: 30.1 % (ref 35–47)
HGB BLD-MCNC: 9.9 G/DL (ref 11.5–16)
LYMPHOCYTES # BLD: 0.2 K/UL (ref 0.8–3.5)
LYMPHOCYTES NFR BLD: 8 % (ref 12–49)
MCH RBC QN AUTO: 31.8 PG (ref 26–34)
MCHC RBC AUTO-ENTMCNC: 32.9 G/DL (ref 30–36.5)
MCV RBC AUTO: 96.8 FL (ref 80–99)
NEUTS SEG # BLD: 2.3 K/UL (ref 1.8–8)
NEUTS SEG NFR BLD: 91 % (ref 32–75)
PLATELET # BLD AUTO: 245 K/UL (ref 150–400)
RBC # BLD AUTO: 3.11 M/UL (ref 3.8–5.2)
WBC # BLD AUTO: 2.5 K/UL (ref 3.6–11)

## 2019-02-08 PROCEDURE — 36415 COLL VENOUS BLD VENIPUNCTURE: CPT

## 2019-02-08 PROCEDURE — 85025 COMPLETE CBC W/AUTO DIFF WBC: CPT

## 2019-02-08 PROCEDURE — 36591 DRAW BLOOD OFF VENOUS DEVICE: CPT

## 2019-02-08 PROCEDURE — 77030012965 HC NDL HUBR BBMI -A

## 2019-02-08 PROCEDURE — 74011000250 HC RX REV CODE- 250: Performed by: INTERNAL MEDICINE

## 2019-02-08 PROCEDURE — 74011250636 HC RX REV CODE- 250/636: Performed by: INTERNAL MEDICINE

## 2019-02-08 RX ORDER — ALBUTEROL SULFATE 0.83 MG/ML
2.5 SOLUTION RESPIRATORY (INHALATION) AS NEEDED
Status: CANCELLED
Start: 2019-02-15

## 2019-02-08 RX ORDER — SODIUM CHLORIDE 9 MG/ML
25 INJECTION, SOLUTION INTRAVENOUS CONTINUOUS
Status: DISPENSED | OUTPATIENT
Start: 2019-02-08 | End: 2019-02-09

## 2019-02-08 RX ORDER — DIPHENHYDRAMINE HYDROCHLORIDE 50 MG/ML
50 INJECTION, SOLUTION INTRAMUSCULAR; INTRAVENOUS ONCE
Status: SHIPPED | OUTPATIENT
Start: 2019-02-08 | End: 2019-02-09

## 2019-02-08 RX ORDER — SODIUM CHLORIDE 9 MG/ML
10 INJECTION INTRAMUSCULAR; INTRAVENOUS; SUBCUTANEOUS AS NEEDED
Status: CANCELLED | OUTPATIENT
Start: 2019-02-15

## 2019-02-08 RX ORDER — DEXAMETHASONE SODIUM PHOSPHATE 4 MG/ML
10 INJECTION, SOLUTION INTRA-ARTICULAR; INTRALESIONAL; INTRAMUSCULAR; INTRAVENOUS; SOFT TISSUE ONCE
Status: SHIPPED | OUTPATIENT
Start: 2019-02-08 | End: 2019-02-09

## 2019-02-08 RX ORDER — DEXAMETHASONE SODIUM PHOSPHATE 4 MG/ML
10 INJECTION, SOLUTION INTRA-ARTICULAR; INTRALESIONAL; INTRAMUSCULAR; INTRAVENOUS; SOFT TISSUE ONCE
Status: CANCELLED | OUTPATIENT
Start: 2019-02-15 | End: 2019-02-15

## 2019-02-08 RX ORDER — SODIUM CHLORIDE 0.9 % (FLUSH) 0.9 %
10 SYRINGE (ML) INJECTION AS NEEDED
Status: ACTIVE | OUTPATIENT
Start: 2019-02-08 | End: 2019-02-08

## 2019-02-08 RX ORDER — SODIUM CHLORIDE 9 MG/ML
10 INJECTION INTRAMUSCULAR; INTRAVENOUS; SUBCUTANEOUS AS NEEDED
Status: ACTIVE | OUTPATIENT
Start: 2019-02-08 | End: 2019-02-08

## 2019-02-08 RX ORDER — DIPHENHYDRAMINE HYDROCHLORIDE 50 MG/ML
50 INJECTION, SOLUTION INTRAMUSCULAR; INTRAVENOUS AS NEEDED
Status: CANCELLED
Start: 2019-02-15

## 2019-02-08 RX ORDER — HEPARIN 100 UNIT/ML
300-500 SYRINGE INTRAVENOUS AS NEEDED
Status: ACTIVE | OUTPATIENT
Start: 2019-02-08 | End: 2019-02-08

## 2019-02-08 RX ORDER — SODIUM CHLORIDE 0.9 % (FLUSH) 0.9 %
10 SYRINGE (ML) INJECTION AS NEEDED
Status: CANCELLED | OUTPATIENT
Start: 2019-02-15

## 2019-02-08 RX ORDER — ONDANSETRON 2 MG/ML
8 INJECTION INTRAMUSCULAR; INTRAVENOUS AS NEEDED
Status: CANCELLED | OUTPATIENT
Start: 2019-02-15

## 2019-02-08 RX ORDER — HEPARIN 100 UNIT/ML
300-500 SYRINGE INTRAVENOUS AS NEEDED
Status: CANCELLED | OUTPATIENT
Start: 2019-02-15

## 2019-02-08 RX ORDER — DIPHENHYDRAMINE HYDROCHLORIDE 50 MG/ML
50 INJECTION, SOLUTION INTRAMUSCULAR; INTRAVENOUS ONCE
Status: CANCELLED | OUTPATIENT
Start: 2019-02-15 | End: 2019-02-15

## 2019-02-08 RX ORDER — ACETAMINOPHEN 325 MG/1
650 TABLET ORAL AS NEEDED
Status: CANCELLED
Start: 2019-02-15

## 2019-02-08 RX ORDER — EPINEPHRINE 1 MG/ML
0.3 INJECTION, SOLUTION, CONCENTRATE INTRAVENOUS AS NEEDED
Status: CANCELLED | OUTPATIENT
Start: 2019-02-15

## 2019-02-08 RX ORDER — SODIUM CHLORIDE 9 MG/ML
25 INJECTION, SOLUTION INTRAVENOUS CONTINUOUS
Status: CANCELLED | OUTPATIENT
Start: 2019-02-15 | End: 2019-02-15

## 2019-02-08 RX ORDER — HYDROCORTISONE SODIUM SUCCINATE 100 MG/2ML
100 INJECTION, POWDER, FOR SOLUTION INTRAMUSCULAR; INTRAVENOUS AS NEEDED
Status: CANCELLED | OUTPATIENT
Start: 2019-02-15

## 2019-02-08 RX ADMIN — Medication 10 ML: at 11:38

## 2019-02-08 RX ADMIN — Medication 10 ML: at 12:45

## 2019-02-08 RX ADMIN — Medication 10 ML: at 11:39

## 2019-02-08 RX ADMIN — SODIUM CHLORIDE 10 ML: 9 INJECTION, SOLUTION INTRAMUSCULAR; INTRAVENOUS; SUBCUTANEOUS at 11:38

## 2019-02-08 RX ADMIN — Medication 500 UNITS: at 12:45

## 2019-02-08 NOTE — PROGRESS NOTES
Pt is a 54 yr old Pt here for routine follow up, she is on Taxol cycle 5, she reports feeling well except some numbness and tingling in her feet and hands, she also reports some irregular bleeding and is following up with her GYN, Pt also c/o of migraines recently, but no pain now. She is here with her  today. Visit Vitals /80 Pulse 98 Temp 98.1 °F (36.7 °C) Resp 18 Ht 5' 5\" (1.651 m) Wt 147 lb (66.7 kg) SpO2 99% BMI 24.46 kg/m² Pain Scale: 0 - No pain/10 Pain Location: 1. Have you been to the ER, urgent care clinic since your last visit? Hospitalized since your last visit? no 
 
2. Have you seen or consulted any other health care providers outside of the [address] [place] [address] since your last visit? Include any pap smears or colon screening. Gyn Health Maintenance reviewed

## 2019-02-08 NOTE — PROGRESS NOTES
Outpatient Infusion Center Short Visit Progress Note [address] Pt admit to [facility] for C6D8 Taxol ambulatory in stable condition. Assessment completed. Pt reports increase in frequency of her migraine headaches. Pt also reports increasing neuropathy with each Taxol treatment, neuropathy currently covers soles of feet and palms of hands. Right chest port accessed with 20 gauge 0.75 inch ramirez needle with positive blood return. Labs drawn as ordered. Patient Vitals for the past 12 hrs: 
 Temp Pulse Resp BP  
02/08/19 1123 97.8 °F (36.6 °C) 98 18 133/74 Called and notified Amira Montes NP of pt's reports of increased neuropathy. NP to evaluate patient and decide if any changes will be made to today's treatment. Recent Results (from the past 12 hour(s)) CBC WITH 3 PART DIFF Collection Time: 02/08/19 11:43 AM  
Result Value Ref Range WBC 2.5 (L) 3.6 - 11.0 K/uL  
 RBC 3.11 (L) 3.80 - 5.20 M/uL HGB 9.9 (L) 11.5 - 16.0 g/dL HCT 30.1 (L) 35.0 - 47.0 % MCV 96.8 80.0 - 99.0 FL  
 MCH 31.8 26.0 - 34.0 PG  
 MCHC 32.9 30.0 - 36.5 g/dL  
 RDW 17.1 (H) 11.8 - 15.8 % PLATELET 916 048 - 795 K/uL NEUTROPHILS 91 (H) 32 - 75 % MIXED CELLS 1 (L) 3.2 - 16.9 % LYMPHOCYTES 8 (L) 12 - 49 % ABS. NEUTROPHILS 2.3 1.8 - 8.0 K/UL  
 ABS. MIXED CELLS 0.0 (L) 0.2 - 1.2 K/uL  
 ABS. LYMPHOCYTES 0.2 (L) 0.8 - 3.5 K/UL  
 DF AUTOMATED Orders received from EFRAIN Bah NP to hold chemotherapy today, plan to dose reduce taxol with next dose. [phone] Pt returned to [facility]. Port flushed and deaccessed per [email or web address]. D/c home ambulatory in no distress.  Pt aware of next appointment scheduled for 2/15/19 at 11 am.

## 2019-02-08 NOTE — PROGRESS NOTES
Problem: Chemotherapy Treatment Goal: *Chemotherapy regimen followed Outcome: Progressing Towards Goal 
Taxol held today due to increased neuropathy.

## 2019-02-08 NOTE — PROGRESS NOTES
2001 [facility] 
at [place] [address], [facility], [address] 
[phone] Follow-up Note Patient: Harsh Ness MRN: [id number]  SSN: [id number] YOB: 1970  Age: 54 y.o. Sex: female Diagnosis: 1. Left breast carcinoma: 
T1b N3a M0 (Stage IIIC) infiltrating ductal carcinoma, Tumor size 0.8 mm, LN +ve, grade 3, ER 0, SC 15%, Her 2 -ve Treatment: 1. Neoadjuvant chemotherapy Adriamycin + Cytoxan - s/p 4 cycles - completed 12/26/2108 Taxol S/P 4 of 12 Subjective:  
  
Harsh Ness is a 54 y.o. female with a new diagnosis of left sided invasive breast carcinoma. She underwent a screening mammogram and was noted to have a density in the upper inner quadrant of the left breast. A biopsy of the lesion showed a diagnosis of invasive breast carcinoma ER -ve SC 15% and Her 2 negative. An MRI did not reveal any additional abnormalities. She has an enlarged supraclavicular LN on the same side which biopsy showed invasive breast cancer. She is receiving neoadjuvant chemotherapy and is report increase neuropathy in her finger tips and toes. She continues to have fatigue. Review of Systems: 
 
Constitutional: fatigue Eyes: negative Ears, Nose, Mouth, Throat, and Face: negative Respiratory: negative Cardiovascular: negative Gastrointestinal: nausea Genitourinary:negative Integument/Breast: skin peeling on both hands Hematologic/Lymphatic: palpable left neck mass Musculoskeletal:negative Neurological: neuropathy of finger tips and feet Past Medical History:  
Diagnosis Date  Frequent headaches  Migraine 22 years Past Surgical History:  
Procedure Laterality Date  HX DILATION AND CURETTAGE    
 2010 Family History Problem Relation Age of Onset  Headache Mother  Headache Maternal Aunt Social History Tobacco Use  
  Smoking status: Never Smoker  Smokeless tobacco: Never Used Substance Use Topics  Alcohol use: No  
  Comment: social  
  
Prior to Admission medications Medication Sig Start Date End Date Taking? Authorizing Provider  
dexamethasone (DECADRON) 4 mg tablet Take 20 mg by mouth every seven (7) days. 12/26/18   Rafa Zamora NP  
ondansetron (ZOFRAN ODT) 4 mg disintegrating tablet Take 1 Tab by mouth every eight (8) hours as needed for Nausea. 12/26/18   Becki Pineda MD  
VIRTUSSIN AC  mg/5 mL solution TK 5ML TO 10ML PO Q 4 H PRF COUGH 12/7/18   Provider, Historical  
oxyCODONE-acetaminophen (PERCOCET) 5-325 mg per tablet Take 1 Tab by mouth every four (4) hours as needed for Pain. Max Daily Amount: 6 Tabs. 11/8/18   Caitie Hale MD  
prochlorperazine (COMPAZINE) 10 mg tablet Take 1 Tab by mouth every six (6) hours as needed for up to 30 doses. 11/2/18   Dave Preciado NP  
lidocaine-prilocaine (EMLA) topical cream Apply  to affected area as needed for Pain. 11/2/18   Dave Preciado NP  
MULTIVITAMIN PO Take  by mouth. Provider, Historical  
ergocalciferol, vitamin D2, (VITAMIN D2 PO) Take  by mouth. Provider, Historical  
SUMAtriptan succinate (ZEMBRACE SYMTOUCH) 3 mg/0.5 mL pnij 1 Syringe by SubCUTAneous route as needed. 1 at HA onset and repeat q 1 hour until WHELAN relieved or until used 4 in 24 hours 10/7/18   Silvestre Sahu NP  
cyclobenzaprine (FLEXERIL) 10 mg tablet TK 1 T PO TID PRN 3/13/18   Provider, Historical  
SUMAtriptan (IMITREX) 100 mg tablet Take 1tab at the onset of HA Repeat in 2 hours. MAX 2 tabs  in 24hrs 3/22/18   Silvestre Sahu NP Allergies Allergen Reactions  Latex Itching Skin gets very dry Objective: Wt Readings from Last 3 Encounters:  
02/08/19 147 lb 9.6 oz (67 kg) 02/01/19 148 lb 4.8 oz (67.3 kg) 01/25/19 149 lb 1.6 oz (67.6 kg) Temp Readings from Last 3 Encounters:  
02/08/19 97.8 °F (36.6 °C) 02/01/19 98 °F (36.7 °C)  
01/25/19 98.2 °F (36.8 °C) BP Readings from Last 3 Encounters:  
02/08/19 133/74  
02/01/19 122/72  
01/25/19 97/60 Pulse Readings from Last 3 Encounters:  
02/08/19 98  
02/01/19 89  
01/25/19 83 Physical Exam: 
 
GENERAL: alert, cooperative, no distress, appears stated age EYE: negative LYMPHATIC: left supraclavicular/lower cervical node feels smaller in size THROAT & NECK: normal and no erythema or exudates noted. LUNG: clear to auscultation bilaterally HEART: regular rate and rhythm ABDOMEN: soft, non-tender EXTREMITIES:  no edema SKIN: rash on palms of both hands NEUROLOGIC: negative Lab Results Component Value Date/Time WBC 3.2 (L) 02/01/2019 11:36 AM  
 HGB 9.9 (L) 02/01/2019 11:36 AM  
 HCT 29.5 (L) 02/01/2019 11:36 AM  
 PLATELET 701 54/28/7521 11:36 AM  
 MCV 95.5 02/01/2019 11:36 AM  
 
 
Lab Results Component Value Date/Time Sodium 137 01/11/2019 11:31 AM  
 Potassium 3.5 01/11/2019 11:31 AM  
 Chloride 105 01/11/2019 11:31 AM  
 CO2 25 01/11/2019 11:31 AM  
 Anion gap 7 01/11/2019 11:31 AM  
 Glucose 126 (H) 01/11/2019 11:31 AM  
 BUN 9 01/11/2019 11:31 AM  
 Creatinine 0.48 (L) 01/11/2019 11:31 AM  
 BUN/Creatinine ratio 19 01/11/2019 11:31 AM  
 GFR est AA >60 01/11/2019 11:31 AM  
 GFR est non-AA >60 01/11/2019 11:31 AM  
 Calcium 8.6 01/11/2019 11:31 AM  
 Bilirubin, total 0.2 01/11/2019 11:31 AM  
 AST (SGOT) 16 01/11/2019 11:31 AM  
 Alk. phosphatase 115 01/11/2019 11:31 AM  
 Protein, total 7.1 01/11/2019 11:31 AM  
 Albumin 3.5 01/11/2019 11:31 AM  
 Globulin 3.6 01/11/2019 11:31 AM  
 A-G Ratio 1.0 (L) 01/11/2019 11:31 AM  
 ALT (SGPT) 20 01/11/2019 11:31 AM  
 
 
 
Assessment: 1. Left breast carcinoma: 
T1b N3a M0 (Stage IIIC) infiltrating ductal carcinoma, Tumor size 0.8 mm, LN +ve, grade 3, ER 0, [place] 15%, Her 2 -ve ECOG PS 0 Intent of Treatment - curative Prognosis- excellent LVEF - 54% - 11/9/2018. Repeat 1/4/2019 55% PET - 11/7/2018 - mildly hypermetabolic left supraclavicular and left MICHAEL lymph nodes. Receiving neoadjuvant chemotherapy Adriamycin/Cyclophosphamide completed 4 cycles - 12/26/2018 Taxol s/p 4 og 15 Tolerating treatment A detailed system by system evaluation of side effect was performed to assess chemotherapy related toxicity. Some nausea - manageable Fatigue - manageable Blood counts are acceptable. Results reviewed with the patient. Symptom management form reviewed with patient. Responding to the treatment 2. Anemia related to chemotherapy Observation 3. Chemotherapy induced nausea Taking nausea meds Reports nausea for 4-5 days after chemotherapy 4. Skin irritation - improving Bilaterally on hands Increase lotion use with eucerin 5. Neuropathy Finger tips and feet Plan to hold this week. We will then dose reduce 20 % next week when we resume week 7 Plan:  
 
 
· Hold taxol this week. We will then dose reduce 20% · Moisturize hands liberally · Dexamethasone 20 mg night before taxol · Follow-up in 2 weeks Signed by: Mel Manning MD 
                   February 8, 2019 
 
 
 
 
CC. Edy Vela MD 
CC.  Minoo Maradiaga MD

## 2019-02-15 ENCOUNTER — HOSPITAL ENCOUNTER (OUTPATIENT)
Dept: INFUSION THERAPY | Age: 49
Discharge: HOME | End: 2019-02-15
Payer: MEDICAID

## 2019-02-15 VITALS
OXYGEN SATURATION: 100 % | DIASTOLIC BLOOD PRESSURE: 82 MMHG | TEMPERATURE: 97.2 F | WEIGHT: 148.1 LBS | HEART RATE: 95 BPM | RESPIRATION RATE: 18 BRPM | HEIGHT: 65 IN | SYSTOLIC BLOOD PRESSURE: 122 MMHG | BODY MASS INDEX: 24.68 KG/M2

## 2019-02-15 DIAGNOSIS — Z17.1 MALIGNANT NEOPLASM OF UPPER-INNER QUADRANT OF LEFT BREAST IN FEMALE, ESTROGEN RECEPTOR NEGATIVE (HCC): Primary | ICD-10-CM

## 2019-02-15 DIAGNOSIS — C50.212 MALIGNANT NEOPLASM OF UPPER-INNER QUADRANT OF LEFT BREAST IN FEMALE, ESTROGEN RECEPTOR NEGATIVE (HCC): Primary | ICD-10-CM

## 2019-02-15 LAB
BASOPHILS # BLD: 0 K/UL (ref 0–0.1)
BASOPHILS NFR BLD: 0 % (ref 0–1)
DIFFERENTIAL METHOD BLD: ABNORMAL
EOSINOPHIL # BLD: 0 K/UL (ref 0–0.4)
EOSINOPHIL NFR BLD: 0 % (ref 0–7)
ERYTHROCYTE [DISTWIDTH] IN BLOOD BY AUTOMATED COUNT: 15 % (ref 11.5–14.5)
HCT VFR BLD AUTO: 32.8 % (ref 35–47)
HGB BLD-MCNC: 10.9 G/DL (ref 11.5–16)
IMM GRANULOCYTES # BLD AUTO: 0 K/UL (ref 0–0.04)
IMM GRANULOCYTES NFR BLD AUTO: 2 % (ref 0–0.5)
LYMPHOCYTES # BLD: 0.1 K/UL (ref 0.8–3.5)
LYMPHOCYTES NFR BLD: 12 % (ref 12–49)
MCH RBC QN AUTO: 31.9 PG (ref 26–34)
MCHC RBC AUTO-ENTMCNC: 33.2 G/DL (ref 30–36.5)
MCV RBC AUTO: 95.9 FL (ref 80–99)
MONOCYTES # BLD: 0 K/UL (ref 0–1)
MONOCYTES NFR BLD: 4 % (ref 5–13)
NEUTS SEG # BLD: 1.1 K/UL (ref 1.8–8)
NEUTS SEG NFR BLD: 82 % (ref 32–75)
NRBC # BLD: 0 K/UL (ref 0–0.01)
NRBC BLD-RTO: 0 PER 100 WBC
PLATELET # BLD AUTO: 202 K/UL (ref 150–400)
PMV BLD AUTO: 10.3 FL (ref 8.9–12.9)
RBC # BLD AUTO: 3.42 M/UL (ref 3.8–5.2)
WBC # BLD AUTO: 1.2 K/UL (ref 3.6–11)

## 2019-02-15 PROCEDURE — 74011250636 HC RX REV CODE- 250/636: Performed by: INTERNAL MEDICINE

## 2019-02-15 PROCEDURE — 96413 CHEMO IV INFUSION 1 HR: CPT

## 2019-02-15 PROCEDURE — 96375 TX/PRO/DX INJ NEW DRUG ADDON: CPT

## 2019-02-15 PROCEDURE — 77030012965 HC NDL HUBR BBMI -A

## 2019-02-15 PROCEDURE — 36415 COLL VENOUS BLD VENIPUNCTURE: CPT

## 2019-02-15 PROCEDURE — 74011250637 HC RX REV CODE- 250/637: Performed by: INTERNAL MEDICINE

## 2019-02-15 PROCEDURE — 74011000250 HC RX REV CODE- 250: Performed by: INTERNAL MEDICINE

## 2019-02-15 PROCEDURE — 85025 COMPLETE CBC W/AUTO DIFF WBC: CPT

## 2019-02-15 RX ORDER — SODIUM CHLORIDE 0.9 % (FLUSH) 0.9 %
10 SYRINGE (ML) INJECTION AS NEEDED
Status: ACTIVE | OUTPATIENT
Start: 2019-02-15 | End: 2019-02-15

## 2019-02-15 RX ORDER — SODIUM CHLORIDE 9 MG/ML
25 INJECTION, SOLUTION INTRAVENOUS CONTINUOUS
Status: DISPENSED | OUTPATIENT
Start: 2019-02-15 | End: 2019-02-15

## 2019-02-15 RX ORDER — SODIUM CHLORIDE 9 MG/ML
10 INJECTION INTRAMUSCULAR; INTRAVENOUS; SUBCUTANEOUS AS NEEDED
Status: ACTIVE | OUTPATIENT
Start: 2019-02-15 | End: 2019-02-15

## 2019-02-15 RX ORDER — DIPHENHYDRAMINE HYDROCHLORIDE 50 MG/ML
50 INJECTION, SOLUTION INTRAMUSCULAR; INTRAVENOUS ONCE
Status: DISCONTINUED | OUTPATIENT
Start: 2019-02-15 | End: 2019-02-15

## 2019-02-15 RX ORDER — DEXAMETHASONE SODIUM PHOSPHATE 4 MG/ML
10 INJECTION, SOLUTION INTRA-ARTICULAR; INTRALESIONAL; INTRAMUSCULAR; INTRAVENOUS; SOFT TISSUE ONCE
Status: COMPLETED | OUTPATIENT
Start: 2019-02-15 | End: 2019-02-15

## 2019-02-15 RX ORDER — HEPARIN 100 UNIT/ML
300-500 SYRINGE INTRAVENOUS AS NEEDED
Status: ACTIVE | OUTPATIENT
Start: 2019-02-15 | End: 2019-02-15

## 2019-02-15 RX ORDER — DIPHENHYDRAMINE HCL 25 MG
50 CAPSULE ORAL ONCE
Status: COMPLETED | OUTPATIENT
Start: 2019-02-15 | End: 2019-02-15

## 2019-02-15 RX ADMIN — PACLITAXEL 115 MG: 6 INJECTION, SOLUTION INTRAVENOUS at 13:31

## 2019-02-15 RX ADMIN — Medication 500 UNITS: at 14:40

## 2019-02-15 RX ADMIN — DIPHENHYDRAMINE HYDROCHLORIDE 50 MG: 25 CAPSULE ORAL at 12:48

## 2019-02-15 RX ADMIN — FAMOTIDINE 20 MG: 10 INJECTION, SOLUTION INTRAVENOUS at 12:48

## 2019-02-15 RX ADMIN — Medication 10 ML: at 14:40

## 2019-02-15 RX ADMIN — Medication 10 ML: at 11:24

## 2019-02-15 RX ADMIN — DEXAMETHASONE SODIUM PHOSPHATE 10 MG: 4 INJECTION, SOLUTION INTRAMUSCULAR; INTRAVENOUS at 12:50

## 2019-02-15 RX ADMIN — SODIUM CHLORIDE 25 ML/HR: 900 INJECTION, SOLUTION INTRAVENOUS at 12:48

## 2019-02-22 ENCOUNTER — HOSPITAL ENCOUNTER (OUTPATIENT)
Dept: INFUSION THERAPY | Age: 49
Discharge: HOME | End: 2019-02-22
Payer: MEDICAID

## 2019-02-22 VITALS
HEART RATE: 97 BPM | BODY MASS INDEX: 24.32 KG/M2 | DIASTOLIC BLOOD PRESSURE: 77 MMHG | WEIGHT: 146 LBS | SYSTOLIC BLOOD PRESSURE: 124 MMHG | RESPIRATION RATE: 18 BRPM | TEMPERATURE: 98.3 F | HEIGHT: 65 IN

## 2019-02-22 DIAGNOSIS — C50.212 MALIGNANT NEOPLASM OF UPPER-INNER QUADRANT OF LEFT BREAST IN FEMALE, ESTROGEN RECEPTOR NEGATIVE (HCC): Primary | ICD-10-CM

## 2019-02-22 DIAGNOSIS — Z17.1 MALIGNANT NEOPLASM OF UPPER-INNER QUADRANT OF LEFT BREAST IN FEMALE, ESTROGEN RECEPTOR NEGATIVE (HCC): Primary | ICD-10-CM

## 2019-02-22 LAB
BASO+EOS+MONOS # BLD AUTO: 0.1 K/UL (ref 0.2–1.2)
BASO+EOS+MONOS NFR BLD AUTO: 2 % (ref 3.2–16.9)
DIFFERENTIAL METHOD BLD: ABNORMAL
ERYTHROCYTE [DISTWIDTH] IN BLOOD BY AUTOMATED COUNT: 13.9 % (ref 11.8–15.8)
HCT VFR BLD AUTO: 32.9 % (ref 35–47)
HGB BLD-MCNC: 11 G/DL (ref 11.5–16)
LYMPHOCYTES # BLD: 0.2 K/UL (ref 0.8–3.5)
LYMPHOCYTES NFR BLD: 5 % (ref 12–49)
MCH RBC QN AUTO: 31.6 PG (ref 26–34)
MCHC RBC AUTO-ENTMCNC: 33.4 G/DL (ref 30–36.5)
MCV RBC AUTO: 94.5 FL (ref 80–99)
NEUTS SEG # BLD: 3.2 K/UL (ref 1.8–8)
NEUTS SEG NFR BLD: 92 % (ref 32–75)
PLATELET # BLD AUTO: 217 K/UL (ref 150–400)
RBC # BLD AUTO: 3.48 M/UL (ref 3.8–5.2)
WBC # BLD AUTO: 3.5 K/UL (ref 3.6–11)

## 2019-02-22 PROCEDURE — 77030012965 HC NDL HUBR BBMI -A

## 2019-02-22 PROCEDURE — 85025 COMPLETE CBC W/AUTO DIFF WBC: CPT

## 2019-02-22 PROCEDURE — 74011250637 HC RX REV CODE- 250/637: Performed by: NURSE PRACTITIONER

## 2019-02-22 PROCEDURE — 74011250636 HC RX REV CODE- 250/636: Performed by: INTERNAL MEDICINE

## 2019-02-22 PROCEDURE — 36415 COLL VENOUS BLD VENIPUNCTURE: CPT

## 2019-02-22 PROCEDURE — 74011000250 HC RX REV CODE- 250

## 2019-02-22 PROCEDURE — 96375 TX/PRO/DX INJ NEW DRUG ADDON: CPT

## 2019-02-22 PROCEDURE — 96413 CHEMO IV INFUSION 1 HR: CPT

## 2019-02-22 PROCEDURE — 74011000250 HC RX REV CODE- 250: Performed by: INTERNAL MEDICINE

## 2019-02-22 RX ORDER — DIPHENHYDRAMINE HYDROCHLORIDE 50 MG/ML
50 INJECTION, SOLUTION INTRAMUSCULAR; INTRAVENOUS ONCE
Status: DISCONTINUED | OUTPATIENT
Start: 2019-02-22 | End: 2019-02-22 | Stop reason: CLARIF

## 2019-02-22 RX ORDER — SODIUM CHLORIDE 9 MG/ML
10 INJECTION INTRAMUSCULAR; INTRAVENOUS; SUBCUTANEOUS AS NEEDED
Status: ACTIVE | OUTPATIENT
Start: 2019-02-22 | End: 2019-02-22

## 2019-02-22 RX ORDER — SODIUM CHLORIDE 9 MG/ML
INJECTION INTRAMUSCULAR; INTRAVENOUS; SUBCUTANEOUS
Status: COMPLETED
Start: 2019-02-22 | End: 2019-02-22

## 2019-02-22 RX ORDER — SODIUM CHLORIDE 9 MG/ML
25 INJECTION, SOLUTION INTRAVENOUS CONTINUOUS
Status: DISPENSED | OUTPATIENT
Start: 2019-02-22 | End: 2019-02-22

## 2019-02-22 RX ORDER — SODIUM CHLORIDE 0.9 % (FLUSH) 0.9 %
10 SYRINGE (ML) INJECTION AS NEEDED
Status: ACTIVE | OUTPATIENT
Start: 2019-02-22 | End: 2019-02-22

## 2019-02-22 RX ORDER — DEXAMETHASONE SODIUM PHOSPHATE 4 MG/ML
10 INJECTION, SOLUTION INTRA-ARTICULAR; INTRALESIONAL; INTRAMUSCULAR; INTRAVENOUS; SOFT TISSUE ONCE
Status: COMPLETED | OUTPATIENT
Start: 2019-02-22 | End: 2019-02-22

## 2019-02-22 RX ORDER — HEPARIN 100 UNIT/ML
300-500 SYRINGE INTRAVENOUS AS NEEDED
Status: ACTIVE | OUTPATIENT
Start: 2019-02-22 | End: 2019-02-22

## 2019-02-22 RX ORDER — DIPHENHYDRAMINE HCL 25 MG
50 CAPSULE ORAL ONCE
Status: COMPLETED | OUTPATIENT
Start: 2019-02-22 | End: 2019-02-22

## 2019-02-22 RX ADMIN — FAMOTIDINE 20 MG: 10 INJECTION, SOLUTION INTRAVENOUS at 11:53

## 2019-02-22 RX ADMIN — SODIUM CHLORIDE 10 ML: 9 INJECTION, SOLUTION INTRAMUSCULAR; INTRAVENOUS; SUBCUTANEOUS at 11:35

## 2019-02-22 RX ADMIN — SODIUM CHLORIDE 25 ML/HR: 900 INJECTION, SOLUTION INTRAVENOUS at 11:43

## 2019-02-22 RX ADMIN — DIPHENHYDRAMINE HYDROCHLORIDE 50 MG: 25 CAPSULE ORAL at 11:52

## 2019-02-22 RX ADMIN — SODIUM CHLORIDE 10 ML: 9 INJECTION INTRAMUSCULAR; INTRAVENOUS; SUBCUTANEOUS at 11:35

## 2019-02-22 RX ADMIN — PACLITAXEL 115 MG: 6 INJECTION, SOLUTION INTRAVENOUS at 12:40

## 2019-02-22 RX ADMIN — Medication 10 ML: at 13:43

## 2019-02-22 RX ADMIN — DEXAMETHASONE SODIUM PHOSPHATE 10 MG: 4 INJECTION, SOLUTION INTRA-ARTICULAR; INTRALESIONAL; INTRAMUSCULAR; INTRAVENOUS; SOFT TISSUE at 11:56

## 2019-02-22 RX ADMIN — Medication 10 ML: at 11:36

## 2019-02-22 RX ADMIN — SODIUM CHLORIDE, PRESERVATIVE FREE 500 UNITS: 5 INJECTION INTRAVENOUS at 13:41

## 2019-02-22 NOTE — PROGRESS NOTES
Outpatient Infusion Center - Chemotherapy Progress Note 508 [facility] Pt admit to [facility] for C6D15 Taxol ambulatory in stable condition. Assessment completed. Pt reports improvement to her neuropathy and hormonal headaches. Right chest port accessed with 20 gauge 0.75 inch ramirez needle with positive blood return. Labs drawn per order and sent. Visit Vitals BP (P) 118/78 Pulse (P) 94 Temp (P) 98.1 °F (36.7 °C) Resp (P) 18 Ht (P) 5' 5\" (1.651 m) Wt (P) 66.2 kg (146 lb) BMI (P) 24.30 kg/m² Recent Results (from the past 12 hour(s)) CBC WITH 3 PART DIFF Collection Time: 02/22/19 11:34 AM  
Result Value Ref Range WBC 3.5 (L) 3.6 - 11.0 K/uL  
 RBC 3.48 (L) 3.80 - 5.20 M/uL  
 HGB 11.0 (L) 11.5 - 16.0 g/dL HCT 32.9 (L) 35.0 - 47.0 % MCV 94.5 80.0 - 99.0 FL  
 MCH 31.6 26.0 - 34.0 PG  
 MCHC 33.4 30.0 - 36.5 g/dL  
 RDW 13.9 11.8 - 15.8 % PLATELET 128 238 - 230 K/uL NEUTROPHILS 92 (H) 32 - 75 % MIXED CELLS 2 (L) 3.2 - 16.9 % LYMPHOCYTES 5 (L) 12 - 49 % ABS. NEUTROPHILS 3.2 1.8 - 8.0 K/UL  
 ABS. MIXED CELLS 0.1 (L) 0.2 - 1.2 K/uL  
 ABS. LYMPHOCYTES 0.2 (L) 0.8 - 3.5 K/UL  
 DF AUTOMATED Labs within treatment parameters. Medications: 
NS Benadryl PO Pepcid IV Decadron IV Taxol IV Blood pressure 124/77, pulse 97, temperature 98.3 °F (36.8 °C), resp. rate 18, height 5' 5\" (1.651 m), weight 66.2 kg (146 lb). [facility] Pt tolerated treatment well. Positive blood return pre and post infusion. Port flushed and deaccessed per [employer]. D/c home ambulatory in no distress.  Pt aware of next [facility] appointment scheduled for 3/1/19 at 11 am.

## 2019-02-22 NOTE — PROGRESS NOTES
Problem: Chemotherapy Treatment Goal: *Chemotherapy regimen followed Outcome: Progressing Towards Goal 
Pt receiving C6D15 Taxol as ordered, tolerating well.

## 2019-03-01 ENCOUNTER — HOSPITAL ENCOUNTER (OUTPATIENT)
Dept: INFUSION THERAPY | Age: 49
Discharge: HOME | End: 2019-03-01
Payer: MEDICAID

## 2019-03-01 ENCOUNTER — OFFICE VISIT (OUTPATIENT)
Dept: ONCOLOGY | Age: 49
End: 2019-03-01

## 2019-03-01 VITALS
TEMPERATURE: 98 F | OXYGEN SATURATION: 98 % | RESPIRATION RATE: 16 BRPM | HEART RATE: 87 BPM | HEIGHT: 65 IN | DIASTOLIC BLOOD PRESSURE: 75 MMHG | WEIGHT: 147.4 LBS | BODY MASS INDEX: 24.56 KG/M2 | SYSTOLIC BLOOD PRESSURE: 121 MMHG

## 2019-03-01 VITALS
TEMPERATURE: 97.4 F | RESPIRATION RATE: 18 BRPM | HEIGHT: 65 IN | HEART RATE: 111 BPM | WEIGHT: 147.1 LBS | BODY MASS INDEX: 24.51 KG/M2 | DIASTOLIC BLOOD PRESSURE: 77 MMHG | OXYGEN SATURATION: 100 % | SYSTOLIC BLOOD PRESSURE: 118 MMHG

## 2019-03-01 DIAGNOSIS — D64.81 ANEMIA ASSOCIATED WITH CHEMOTHERAPY: ICD-10-CM

## 2019-03-01 DIAGNOSIS — Z17.1 MALIGNANT NEOPLASM OF UPPER-INNER QUADRANT OF LEFT BREAST IN FEMALE, ESTROGEN RECEPTOR NEGATIVE (HCC): Primary | ICD-10-CM

## 2019-03-01 DIAGNOSIS — T45.1X5A ANEMIA ASSOCIATED WITH CHEMOTHERAPY: ICD-10-CM

## 2019-03-01 DIAGNOSIS — C50.212 MALIGNANT NEOPLASM OF UPPER-INNER QUADRANT OF LEFT BREAST IN FEMALE, ESTROGEN RECEPTOR NEGATIVE (HCC): Primary | ICD-10-CM

## 2019-03-01 LAB
ALBUMIN SERPL-MCNC: 3.5 G/DL (ref 3.5–5)
ALBUMIN/GLOB SERPL: 0.9 {RATIO} (ref 1.1–2.2)
ALP SERPL-CCNC: 95 U/L (ref 45–117)
ALT SERPL-CCNC: 85 U/L (ref 12–78)
ANION GAP SERPL CALC-SCNC: 6 MMOL/L (ref 5–15)
AST SERPL-CCNC: 41 U/L (ref 15–37)
BASO+EOS+MONOS # BLD AUTO: 0.1 K/UL (ref 0.2–1.2)
BASO+EOS+MONOS NFR BLD AUTO: 4 % (ref 3.2–16.9)
BILIRUB SERPL-MCNC: 0.4 MG/DL (ref 0.2–1)
BUN SERPL-MCNC: 11 MG/DL (ref 6–20)
BUN/CREAT SERPL: 22 (ref 12–20)
CALCIUM SERPL-MCNC: 8.7 MG/DL (ref 8.5–10.1)
CHLORIDE SERPL-SCNC: 106 MMOL/L (ref 97–108)
CO2 SERPL-SCNC: 26 MMOL/L (ref 21–32)
CREAT SERPL-MCNC: 0.51 MG/DL (ref 0.55–1.02)
DIFFERENTIAL METHOD BLD: ABNORMAL
ERYTHROCYTE [DISTWIDTH] IN BLOOD BY AUTOMATED COUNT: 13.8 % (ref 11.8–15.8)
GLOBULIN SER CALC-MCNC: 3.7 G/DL (ref 2–4)
GLUCOSE SERPL-MCNC: 132 MG/DL (ref 65–100)
HCT VFR BLD AUTO: 33.2 % (ref 35–47)
HGB BLD-MCNC: 11.3 G/DL (ref 11.5–16)
LYMPHOCYTES # BLD: 0.2 K/UL (ref 0.8–3.5)
LYMPHOCYTES NFR BLD: 11 % (ref 12–49)
MCH RBC QN AUTO: 32.4 PG (ref 26–34)
MCHC RBC AUTO-ENTMCNC: 34 G/DL (ref 30–36.5)
MCV RBC AUTO: 95.1 FL (ref 80–99)
NEUTS SEG # BLD: 1.4 K/UL (ref 1.8–8)
NEUTS SEG NFR BLD: 85 % (ref 32–75)
PLATELET # BLD AUTO: 223 K/UL (ref 150–400)
POTASSIUM SERPL-SCNC: 3.7 MMOL/L (ref 3.5–5.1)
PROT SERPL-MCNC: 7.2 G/DL (ref 6.4–8.2)
RBC # BLD AUTO: 3.49 M/UL (ref 3.8–5.2)
SODIUM SERPL-SCNC: 138 MMOL/L (ref 136–145)
WBC # BLD AUTO: 1.7 K/UL (ref 3.6–11)

## 2019-03-01 PROCEDURE — 80053 COMPREHEN METABOLIC PANEL: CPT

## 2019-03-01 PROCEDURE — 36415 COLL VENOUS BLD VENIPUNCTURE: CPT

## 2019-03-01 PROCEDURE — 74011000250 HC RX REV CODE- 250: Performed by: INTERNAL MEDICINE

## 2019-03-01 PROCEDURE — 96375 TX/PRO/DX INJ NEW DRUG ADDON: CPT

## 2019-03-01 PROCEDURE — 85025 COMPLETE CBC W/AUTO DIFF WBC: CPT

## 2019-03-01 PROCEDURE — 77030012965 HC NDL HUBR BBMI -A

## 2019-03-01 PROCEDURE — 74011250636 HC RX REV CODE- 250/636: Performed by: INTERNAL MEDICINE

## 2019-03-01 PROCEDURE — 74011250637 HC RX REV CODE- 250/637: Performed by: INTERNAL MEDICINE

## 2019-03-01 PROCEDURE — 96413 CHEMO IV INFUSION 1 HR: CPT

## 2019-03-01 RX ORDER — SODIUM CHLORIDE 9 MG/ML
25 INJECTION, SOLUTION INTRAVENOUS CONTINUOUS
Status: DISPENSED | OUTPATIENT
Start: 2019-03-01 | End: 2019-03-01

## 2019-03-01 RX ORDER — SODIUM CHLORIDE 0.9 % (FLUSH) 0.9 %
10 SYRINGE (ML) INJECTION AS NEEDED
Status: ACTIVE | OUTPATIENT
Start: 2019-03-01 | End: 2019-03-01

## 2019-03-01 RX ORDER — DIPHENHYDRAMINE HCL 25 MG
50 CAPSULE ORAL ONCE
Status: CANCELLED | OUTPATIENT
Start: 2019-03-15 | End: 2019-03-15

## 2019-03-01 RX ORDER — DIPHENHYDRAMINE HYDROCHLORIDE 50 MG/ML
50 INJECTION, SOLUTION INTRAMUSCULAR; INTRAVENOUS ONCE
Status: DISCONTINUED | OUTPATIENT
Start: 2019-03-01 | End: 2019-03-01

## 2019-03-01 RX ORDER — DIPHENHYDRAMINE HCL 25 MG
50 CAPSULE ORAL ONCE
Status: CANCELLED | OUTPATIENT
Start: 2019-03-08 | End: 2019-03-08

## 2019-03-01 RX ORDER — DIPHENHYDRAMINE HCL 25 MG
50 CAPSULE ORAL ONCE
Status: COMPLETED | OUTPATIENT
Start: 2019-03-01 | End: 2019-03-01

## 2019-03-01 RX ORDER — HEPARIN 100 UNIT/ML
300-500 SYRINGE INTRAVENOUS AS NEEDED
Status: ACTIVE | OUTPATIENT
Start: 2019-03-01 | End: 2019-03-01

## 2019-03-01 RX ORDER — SODIUM CHLORIDE 9 MG/ML
10 INJECTION INTRAMUSCULAR; INTRAVENOUS; SUBCUTANEOUS AS NEEDED
Status: ACTIVE | OUTPATIENT
Start: 2019-03-01 | End: 2019-03-01

## 2019-03-01 RX ORDER — DEXAMETHASONE SODIUM PHOSPHATE 4 MG/ML
10 INJECTION, SOLUTION INTRA-ARTICULAR; INTRALESIONAL; INTRAMUSCULAR; INTRAVENOUS; SOFT TISSUE ONCE
Status: COMPLETED | OUTPATIENT
Start: 2019-03-01 | End: 2019-03-01

## 2019-03-01 RX ADMIN — DEXAMETHASONE SODIUM PHOSPHATE 10 MG: 4 INJECTION, SOLUTION INTRAMUSCULAR; INTRAVENOUS at 12:25

## 2019-03-01 RX ADMIN — Medication 10 ML: at 13:50

## 2019-03-01 RX ADMIN — Medication 500 UNITS: at 13:50

## 2019-03-01 RX ADMIN — FAMOTIDINE 20 MG: 10 INJECTION, SOLUTION INTRAVENOUS at 12:22

## 2019-03-01 RX ADMIN — Medication 10 ML: at 11:28

## 2019-03-01 RX ADMIN — PACLITAXEL 115 MG: 6 INJECTION, SOLUTION INTRAVENOUS at 12:45

## 2019-03-01 RX ADMIN — DIPHENHYDRAMINE HYDROCHLORIDE 50 MG: 25 CAPSULE ORAL at 12:22

## 2019-03-01 RX ADMIN — SODIUM CHLORIDE 25 ML/HR: 900 INJECTION, SOLUTION INTRAVENOUS at 12:22

## 2019-03-01 NOTE — PROGRESS NOTES
Shmuel Rodriguez a 57 y.o. female here today for left sided breast cancer f/u. . S/P 4 cycles of AC. Pt. receiving Taxol; cycle 7. VS stable. Patient denies pain. Good appetite. Patient denies N/V/D and constipation. Patient reports the  numbness and tingling in her hands and the bottom of her feet has decreased. Patient denies mouth ulcers. Patient denies cough. Patient denies SOB. Patient denies falls. Visit Vitals  /75   Pulse 87   Temp 98 °F (36.7 °C) (Oral)   Resp 16   Ht 5' 5\" (1.651 m)   Wt 147 lb 6.4 oz (66.9 kg)   BMI 24.53 kg/m²       Pain Scale: 0 - No pain/10  Pain Location:     Health Maintenance Review: Patient reminded of \"due or due soon\" health maintenance. I have asked the patient to contact his/her primary care provider (PCP) for follow-up on his/her health maintenance.

## 2019-03-01 NOTE — PROGRESS NOTES
[address] [facility]   Social Work Navigator Encounter     Patient Name:  Brigitte Holm    Medical History: dx breast cancer    Advance Directives:    Narrative: pt stated everything is good. Pt requested to see Dr not NPs. pt made a comment 'if you make a deposit for me\". ..SW inquired if she had reduced her work hours. Pt stated her company had lost the contract and she was not working and did not plan on looking - she was enjoying being home. Pt also stated she was on 7th treatment and she was hoping not to have to do them all. .... Dr. Jeff Solares reduced the dose also. Pt talked about hot flashes. Barriers to Care:     Plan:   1. Continue to provide psychosocial support as need.

## 2019-03-01 NOTE — PROGRESS NOTES
Pt arrived to [facility] in no acute distress at 1120 for Taxol C7.  Assessment unremarkable. R chest port accessed without issue and positive blood return noted.  Labs obtained, CBCap, CMP. Pt iced hands/feet during chemo infusion. Visit Vitals  /77 (BP 1 Location: Left arm, BP Patient Position: Sitting)   Pulse 90   Temp 97.4 °F (36.3 °C)   Resp 18   Ht 5' 5\" (1.651 m)   Wt 66.7 kg (147 lb 1.6 oz)   SpO2 100%   BMI 24.48 kg/m²     Recent Results (from the past 12 hour(s))   METABOLIC PANEL, COMPREHENSIVE    Collection Time: 03/01/19 11:24 AM   Result Value Ref Range    Sodium 138 136 - 145 mmol/L    Potassium 3.7 3.5 - 5.1 mmol/L    Chloride 106 97 - 108 mmol/L    CO2 26 21 - 32 mmol/L    Anion gap 6 5 - 15 mmol/L    Glucose 132 (H) 65 - 100 mg/dL    BUN 11 6 - 20 MG/DL    Creatinine 0.51 (L) 0.55 - 1.02 MG/DL    BUN/Creatinine ratio 22 (H) 12 - 20      GFR est AA >60 >60 ml/min/1.73m2    GFR est non-AA >60 >60 ml/min/1.73m2    Calcium 8.7 8.5 - 10.1 MG/DL    Bilirubin, total 0.4 0.2 - 1.0 MG/DL    ALT (SGPT) 85 (H) 12 - 78 U/L    AST (SGOT) 41 (H) 15 - 37 U/L    Alk. phosphatase 95 45 - 117 U/L    Protein, total 7.2 6.4 - 8.2 g/dL    Albumin 3.5 3.5 - 5.0 g/dL    Globulin 3.7 2.0 - 4.0 g/dL    A-G Ratio 0.9 (L) 1.1 - 2.2     CBC WITH 3 PART DIFF    Collection Time: 03/01/19 11:24 AM   Result Value Ref Range    WBC 1.7 (L) 3.6 - 11.0 K/uL    RBC 3.49 (L) 3.80 - 5.20 M/uL    HGB 11.3 (L) 11.5 - 16.0 g/dL    HCT 33.2 (L) 35.0 - 47.0 %    MCV 95.1 80.0 - 99.0 FL    MCH 32.4 26.0 - 34.0 PG    MCHC 34.0 30.0 - 36.5 g/dL    RDW 13.8 11.8 - 15.8 %    PLATELET 316 199 - 863 K/uL    NEUTROPHILS 85 (H) 32 - 75 %    MIXED CELLS 4 3.2 - 16.9 %    LYMPHOCYTES 11 (L) 12 - 49 %    ABS. NEUTROPHILS 1.4 (L) 1.8 - 8.0 K/UL    ABS. MIXED CELLS 0.1 (L) 0.2 - 1.2 K/uL    ABS.  LYMPHOCYTES 0.2 (L) 0.8 - 3.5 K/UL    DF AUTOMATED       The following medications administered:  [email or web address]  Benadryl 50mg PO  Pepcid 20mg IVP  Decadron 10mg IVP  Taxol 115mg IV over 1 hour    Visit Vitals  /77   Pulse (!) 111   Temp 97.4 °F (36.3 °C)   Resp 18   Ht 5' 5\" (1.651 m)   Wt 66.7 kg (147 lb 1.6 oz)   SpO2 100%   BMI 24.48 kg/m²     Pt tolerated treatment well. Port flushed per policy and de-accessed, 2x2 and tape placed.  Pt discharged ambulatory in no acute distress at 1350, accompanied by spouse. Next appointment 3/8/19 at 1100.

## 2019-03-01 NOTE — PROGRESS NOTES
[address], [address] [place] [address] Be Vanceu, [address] [facility]  [phone]      Follow-up Note        Patient: Liz Tobar MRN: [id number]  SSN: [id number]    YOB: 1970  Age: 54 y.o. Sex: female      Diagnosis:     1. Left breast carcinoma: Dx: 9/17/2018  T1b N3a M0 (Stage IIIC) infiltrating ductal carcinoma, Tumor size 0.8 mm, LN +ve, grade 3, ER 0, MD 15%, Her 2 -ve     Treatment:     1. Neoadjuvant chemotherapy   Adriamycin + Cytoxan - s/p 4 cycles - completed 12/26/2108              Taxol week 7 of 12    Subjective:      Liz Tobar is a 54 y.o. female with a new diagnosis of left sided invasive breast carcinoma. She underwent a screening mammogram and was noted to have a density in the upper inner quadrant of the left breast. A biopsy of the lesion showed a diagnosis of invasive breast carcinoma ER -ve MD 15% and Her 2 negative. An MRI did not reveal any additional abnormalities. She has an enlarged supraclavicular LN on the same side which biopsy showed invasive breast cancer. She is receiving neoadjuvant chemotherapy and is report increase neuropathy in her finger tips and toes. Taxol was dose reduced 20 %. She continues to have fatigue, but no other side effects.        Review of Systems:    Constitutional: fatigue  Eyes: negative  Ears, Nose, Mouth, Throat, and Face: negative  Respiratory: negative  Cardiovascular: negative  Gastrointestinal: nausea  Genitourinary:negative  Integument/Breast: negative  Hematologic/Lymphatic: palpable left neck mass  Musculoskeletal:negative  Neurological: neuropathy of finger tips and feet        Past Medical History:   Diagnosis Date    Frequent headaches     Migraine     22 years     Past Surgical History:   Procedure Laterality Date    HX DILATION AND CURETTAGE      2010      Family History   Problem Relation Age of Onset    Headache Mother     Headache Maternal Aunt      Social History     Tobacco Use    Smoking status: Never Smoker    Smokeless tobacco: Never Used   Substance Use Topics    Alcohol use: No     Comment: social      Prior to Admission medications    Medication Sig Start Date End Date Taking? Authorizing Provider   dexamethasone (DECADRON) 4 mg tablet Take 20 mg by mouth every seven (7) days. 12/26/18  Yes Mel Zamora NP   ondansetron (ZOFRAN ODT) 4 mg disintegrating tablet Take 1 Tab by mouth every eight (8) hours as needed for Nausea. 12/26/18  Yes Nicholas Maurice MD   VIRTUSSIN AC  mg/5 mL solution TK 5ML TO 10ML PO Q 4 H PRF COUGH 12/7/18  Yes Provider, Historical   oxyCODONE-acetaminophen (PERCOCET) 5-325 mg per tablet Take 1 Tab by mouth every four (4) hours as needed for Pain. Max Daily Amount: 6 Tabs. 11/8/18  Yes Maryann De La Vega MD   prochlorperazine (COMPAZINE) 10 mg tablet Take 1 Tab by mouth every six (6) hours as needed for up to 30 doses. 11/2/18  Yes Mel Zamora NP   lidocaine-prilocaine (EMLA) topical cream Apply  to affected area as needed for Pain. 11/2/18  Yes Enid YOUNG NP   MULTIVITAMIN PO Take  by mouth. Yes Provider, Historical   ergocalciferol, vitamin D2, (VITAMIN D2 PO) Take  by mouth. Yes Provider, Historical   SUMAtriptan succinate (ZEMBRACE SYMTOUCH) 3 mg/0.5 mL pnij 1 Syringe by SubCUTAneous route as needed. 1 at HA onset and repeat q 1 hour until WHELAN relieved or until used 4 in 24 hours 10/7/18  Yes John Turpin NP   cyclobenzaprine (FLEXERIL) 10 mg tablet TK 1 T PO TID PRN 3/13/18  Yes Provider, Historical   SUMAtriptan (IMITREX) 100 mg tablet Take 1tab at the onset of HA Repeat in 2 hours. MAX 2 tabs  in 24hrs 3/22/18  Yes John Turpin NP          Allergies   Allergen Reactions    Latex Itching     Skin gets very dry         Objective:      Wt Readings from Last 3 Encounters:   03/01/19 147 lb 6.4 oz (66.9 kg)   03/01/19 147 lb 1.6 oz (66.7 kg)   02/22/19 146 lb (66.2 kg)     Temp Readings from Last 3 Encounters:   03/01/19 98 °F (36.7 °C) (Oral)   03/01/19 97.4 °F (36.3 °C)   02/22/19 98.3 °F (36.8 °C)     BP Readings from Last 3 Encounters:   03/01/19 121/75   03/01/19 121/77   02/22/19 124/77     Pulse Readings from Last 3 Encounters:   03/01/19 87   03/01/19 90   02/22/19 97         Physical Exam:    GENERAL: alert, cooperative, no distress, appears stated age  EYE: negative  LYMPHATIC: left supraclavicular/lower cervical node feels smaller in size  THROAT & NECK: normal and no erythema or exudates noted. LUNG: clear to auscultation bilaterally  HEART: regular rate and rhythm  ABDOMEN: soft, non-tender  EXTREMITIES:  no edema  SKIN: no rash  NEUROLOGIC: negative      Lab Results   Component Value Date/Time    WBC 1.7 (L) 03/01/2019 11:24 AM    HGB 11.3 (L) 03/01/2019 11:24 AM    HCT 33.2 (L) 03/01/2019 11:24 AM    PLATELET 214 38/[birth year]/1956 11:24 AM    MCV 95.1 03/01/2019 11:24 AM       Lab Results   Component Value Date/Time    Sodium 138 03/01/2019 11:24 AM    Potassium 3.7 03/01/2019 11:24 AM    Chloride 106 03/01/2019 11:24 AM    CO2 26 03/01/2019 11:24 AM    Anion gap 6 03/01/2019 11:24 AM    Glucose 132 (H) 03/01/2019 11:24 AM    BUN 11 03/01/2019 11:24 AM    Creatinine 0.51 (L) 03/01/2019 11:24 AM    BUN/Creatinine ratio 22 (H) 03/01/2019 11:24 AM    GFR est AA >60 03/01/2019 11:24 AM    GFR est non-AA >60 03/01/2019 11:24 AM    Calcium 8.7 03/01/2019 11:24 AM    Bilirubin, total 0.4 03/01/2019 11:24 AM    AST (SGOT) 41 (H) 03/01/2019 11:24 AM    Alk. phosphatase 95 03/01/2019 11:24 AM    Protein, total 7.2 03/01/2019 11:24 AM    Albumin 3.5 03/01/2019 11:24 AM    Globulin 3.7 03/01/2019 11:24 AM    A-G Ratio 0.9 (L) 03/01/2019 11:24 AM    ALT (SGPT) 85 (H) 03/01/2019 11:24 AM         Assessment:     1.  Left breast carcinoma:  T1b N3a M0 (Stage IIIC) infiltrating ductal carcinoma, Tumor size 0.8 mm, LN +ve, grade 3, ER 0, MD 15%, Her 2 -ve      ECOG PS 0  Intent of Treatment - curative  Prognosis- excellent    LVEF - 54% - 11/9/2018. Repeat 1/4/2019 55%    PET - 11/7/2018 - mildly hypermetabolic left supraclavicular and left MICHAEL lymph nodes. Receiving neoadjuvant chemotherapy   Adriamycin/Cyclophosphamide completed 4 cycles - 12/26/2018             Taxol week 7 of 12    Tolerating treatment  A detailed system by system evaluation of side effect was performed to assess chemotherapy related toxicity. Some nausea - manageable   Fatigue - manageable  Blood counts are acceptable. Results reviewed with the patient. Symptom management form reviewed with patient. Responding to the treatment     The patient received IV Vit C infusion in the recent past. The  is looking for her to receive some OTC 'immunotherapy'. I cautioned then against receiving anything other than ongoing treatments. 2. Anemia related to chemotherapy     Observation      3. Chemotherapy induced nausea    Taking nausea meds  Reports nausea for 4-5 days after chemotherapy      4. Skin irritation - improved    Bilaterally on hands  Increase lotion use with eucerin      5. Neuropathy    Finger tips and feet  Dose reduce 20 % starting week 7      Plan:       · Continue with week 7 of Taxol today  · Moisturize hands liberally  · Dexamethasone 20 mg night before taxol  · Follow-up in 2 weeks      Signed by: Beth Faria MD                     March 1, 2019            Elda Allison MD  CC.  Sandra Quintanilla MD

## 2019-03-08 ENCOUNTER — HOSPITAL ENCOUNTER (OUTPATIENT)
Dept: INFUSION THERAPY | Age: 49
Discharge: HOME | End: 2019-03-08
Payer: MEDICAID

## 2019-03-08 VITALS
WEIGHT: 152.5 LBS | DIASTOLIC BLOOD PRESSURE: 73 MMHG | RESPIRATION RATE: 18 BRPM | TEMPERATURE: 98.2 F | BODY MASS INDEX: 25.41 KG/M2 | HEIGHT: 65 IN | SYSTOLIC BLOOD PRESSURE: 118 MMHG | OXYGEN SATURATION: 100 % | HEART RATE: 92 BPM

## 2019-03-08 DIAGNOSIS — C50.212 MALIGNANT NEOPLASM OF UPPER-INNER QUADRANT OF LEFT BREAST IN FEMALE, ESTROGEN RECEPTOR NEGATIVE (HCC): Primary | ICD-10-CM

## 2019-03-08 DIAGNOSIS — Z17.1 MALIGNANT NEOPLASM OF UPPER-INNER QUADRANT OF LEFT BREAST IN FEMALE, ESTROGEN RECEPTOR NEGATIVE (HCC): Primary | ICD-10-CM

## 2019-03-08 LAB
BASO+EOS+MONOS # BLD AUTO: 0 K/UL (ref 0.2–1.2)
BASO+EOS+MONOS NFR BLD AUTO: 3 % (ref 3.2–16.9)
DIFFERENTIAL METHOD BLD: ABNORMAL
ERYTHROCYTE [DISTWIDTH] IN BLOOD BY AUTOMATED COUNT: 13.7 % (ref 11.8–15.8)
HCT VFR BLD AUTO: 32.3 % (ref 35–47)
HGB BLD-MCNC: 10.9 G/DL (ref 11.5–16)
LYMPHOCYTES # BLD: 0.2 K/UL (ref 0.8–3.5)
LYMPHOCYTES NFR BLD: 14 % (ref 12–49)
MCH RBC QN AUTO: 32.3 PG (ref 26–34)
MCHC RBC AUTO-ENTMCNC: 33.7 G/DL (ref 30–36.5)
MCV RBC AUTO: 95.8 FL (ref 80–99)
NEUTS SEG # BLD: 1.3 K/UL (ref 1.8–8)
NEUTS SEG NFR BLD: 83 % (ref 32–75)
PLATELET # BLD AUTO: 210 K/UL (ref 150–400)
RBC # BLD AUTO: 3.37 M/UL (ref 3.8–5.2)
WBC # BLD AUTO: 1.5 K/UL (ref 3.6–11)

## 2019-03-08 PROCEDURE — 74011000250 HC RX REV CODE- 250: Performed by: INTERNAL MEDICINE

## 2019-03-08 PROCEDURE — 85025 COMPLETE CBC W/AUTO DIFF WBC: CPT

## 2019-03-08 PROCEDURE — 36415 COLL VENOUS BLD VENIPUNCTURE: CPT

## 2019-03-08 PROCEDURE — 74011250636 HC RX REV CODE- 250/636: Performed by: INTERNAL MEDICINE

## 2019-03-08 PROCEDURE — 96375 TX/PRO/DX INJ NEW DRUG ADDON: CPT

## 2019-03-08 PROCEDURE — 96413 CHEMO IV INFUSION 1 HR: CPT

## 2019-03-08 PROCEDURE — 77030012965 HC NDL HUBR BBMI -A

## 2019-03-08 PROCEDURE — 74011250637 HC RX REV CODE- 250/637: Performed by: INTERNAL MEDICINE

## 2019-03-08 RX ORDER — SODIUM CHLORIDE 9 MG/ML
10 INJECTION INTRAMUSCULAR; INTRAVENOUS; SUBCUTANEOUS AS NEEDED
Status: ACTIVE | OUTPATIENT
Start: 2019-03-08 | End: 2019-03-08

## 2019-03-08 RX ORDER — SODIUM CHLORIDE 9 MG/ML
25 INJECTION, SOLUTION INTRAVENOUS CONTINUOUS
Status: DISPENSED | OUTPATIENT
Start: 2019-03-08 | End: 2019-03-08

## 2019-03-08 RX ORDER — DEXAMETHASONE SODIUM PHOSPHATE 4 MG/ML
10 INJECTION, SOLUTION INTRA-ARTICULAR; INTRALESIONAL; INTRAMUSCULAR; INTRAVENOUS; SOFT TISSUE ONCE
Status: COMPLETED | OUTPATIENT
Start: 2019-03-08 | End: 2019-03-08

## 2019-03-08 RX ORDER — SODIUM CHLORIDE 0.9 % (FLUSH) 0.9 %
10 SYRINGE (ML) INJECTION AS NEEDED
Status: ACTIVE | OUTPATIENT
Start: 2019-03-08 | End: 2019-03-08

## 2019-03-08 RX ORDER — DIPHENHYDRAMINE HCL 25 MG
50 CAPSULE ORAL ONCE
Status: COMPLETED | OUTPATIENT
Start: 2019-03-08 | End: 2019-03-08

## 2019-03-08 RX ORDER — HEPARIN 100 UNIT/ML
300-500 SYRINGE INTRAVENOUS AS NEEDED
Status: ACTIVE | OUTPATIENT
Start: 2019-03-08 | End: 2019-03-08

## 2019-03-08 RX ADMIN — Medication 500 UNITS: at 14:22

## 2019-03-08 RX ADMIN — PACLITAXEL 115 MG: 6 INJECTION, SOLUTION INTRAVENOUS at 13:07

## 2019-03-08 RX ADMIN — SODIUM CHLORIDE 25 ML/HR: 900 INJECTION, SOLUTION INTRAVENOUS at 11:45

## 2019-03-08 RX ADMIN — Medication 10 ML: at 14:22

## 2019-03-08 RX ADMIN — Medication 10 ML: at 11:32

## 2019-03-08 RX ADMIN — FAMOTIDINE 20 MG: 10 INJECTION, SOLUTION INTRAVENOUS at 11:59

## 2019-03-08 RX ADMIN — SODIUM CHLORIDE 10 ML: 9 INJECTION, SOLUTION INTRAMUSCULAR; INTRAVENOUS; SUBCUTANEOUS at 11:27

## 2019-03-08 RX ADMIN — DIPHENHYDRAMINE HYDROCHLORIDE 50 MG: 25 CAPSULE ORAL at 11:59

## 2019-03-08 RX ADMIN — DEXAMETHASONE SODIUM PHOSPHATE 10 MG: 4 INJECTION, SOLUTION INTRAMUSCULAR; INTRAVENOUS at 11:59

## 2019-03-08 NOTE — PROGRESS NOTES
Problem: Chemotherapy Treatment  Goal: *Chemotherapy regimen followed  Outcome: Progressing Towards Goal  PT received C7D8 Taxol as ordered, tolerated well.

## 2019-03-08 NOTE — PROGRESS NOTES
[facility] - Chemotherapy Progress Note    1110  Pt admit to [facility] for C7D8 Taxol ambulatory in stable condition. Assessment completed. No new concerns voiced, numbness continues to be better. Right chest port accessed with 20 gauge 0.75 inch ramirez needle with positive blood return. Labs drawn per order and sent. Visit Vitals  /74   Pulse 90   Temp 98 °F (36.7 °C)   Resp 18   Ht 5' 5\" (1.651 m)   Wt 69.2 kg (152 lb 8 oz)   SpO2 100%   BMI 25.38 kg/m²       Recent Results (from the past 12 hour(s))   CBC WITH 3 PART DIFF    Collection Time: 03/08/19 11:26 AM   Result Value Ref Range    WBC 1.5 (L) 3.6 - 11.0 K/uL    RBC 3.37 (L) 3.80 - 5.20 M/uL    HGB 10.9 (L) 11.5 - 16.0 g/dL    HCT 32.3 (L) 35.0 - 47.0 %    MCV 95.8 80.0 - 99.0 FL    MCH 32.3 26.0 - 34.0 PG    MCHC 33.7 30.0 - 36.5 g/dL    RDW 13.7 11.8 - 15.8 %    PLATELET 358 061 - 736 K/uL    NEUTROPHILS 83 (H) 32 - 75 %    MIXED CELLS 3 (L) 3.2 - 16.9 %    LYMPHOCYTES 14 12 - 49 %    ABS. NEUTROPHILS 1.3 (L) 1.8 - 8.0 K/UL    ABS. MIXED CELLS 0.0 (L) 0.2 - 1.2 K/uL    ABS. LYMPHOCYTES 0.2 (L) 0.8 - 3.5 K/UL    DF AUTOMATED         Labs within treatment parameters. Medications:  NS  Benadryl PO  Pepcid IV  Decadron IV  Taxol IV    Blood pressure 118/73, pulse 92, temperature 98.2 °F (36.8 °C), resp. rate 18, height 5' 5\" (1.651 m), weight 69.2 kg (152 lb 8 oz), SpO2 100 %. 1430  Pt tolerated treatment well. Positive blood return pre and post infusion. Port flushed and deaccessed per [employer]. D/c home ambulatory in no distress. Pt aware of next [facility] appointment scheduled for 3/15/19 at 0900.

## 2019-03-15 ENCOUNTER — HOSPITAL ENCOUNTER (OUTPATIENT)
Dept: INFUSION THERAPY | Age: 49
Discharge: HOME | End: 2019-03-15
Payer: MEDICAID

## 2019-03-15 VITALS
SYSTOLIC BLOOD PRESSURE: 109 MMHG | BODY MASS INDEX: 25.16 KG/M2 | TEMPERATURE: 99 F | RESPIRATION RATE: 18 BRPM | DIASTOLIC BLOOD PRESSURE: 72 MMHG | WEIGHT: 151 LBS | HEIGHT: 65 IN | HEART RATE: 91 BPM | OXYGEN SATURATION: 100 %

## 2019-03-15 DIAGNOSIS — C50.212 MALIGNANT NEOPLASM OF UPPER-INNER QUADRANT OF LEFT BREAST IN FEMALE, ESTROGEN RECEPTOR NEGATIVE (HCC): Primary | ICD-10-CM

## 2019-03-15 DIAGNOSIS — Z17.1 MALIGNANT NEOPLASM OF UPPER-INNER QUADRANT OF LEFT BREAST IN FEMALE, ESTROGEN RECEPTOR NEGATIVE (HCC): Primary | ICD-10-CM

## 2019-03-15 LAB
BASO+EOS+MONOS # BLD AUTO: 0.1 K/UL (ref 0.2–1.2)
BASO+EOS+MONOS NFR BLD AUTO: 7 % (ref 3.2–16.9)
DIFFERENTIAL METHOD BLD: ABNORMAL
ERYTHROCYTE [DISTWIDTH] IN BLOOD BY AUTOMATED COUNT: 13.2 % (ref 11.8–15.8)
HCT VFR BLD AUTO: 33.8 % (ref 35–47)
HGB BLD-MCNC: 11.4 G/DL (ref 11.5–16)
LYMPHOCYTES # BLD: 0.2 K/UL (ref 0.8–3.5)
LYMPHOCYTES NFR BLD: 12 % (ref 12–49)
MCH RBC QN AUTO: 32.2 PG (ref 26–34)
MCHC RBC AUTO-ENTMCNC: 33.7 G/DL (ref 30–36.5)
MCV RBC AUTO: 95.5 FL (ref 80–99)
NEUTS SEG # BLD: 1.6 K/UL (ref 1.8–8)
NEUTS SEG NFR BLD: 81 % (ref 32–75)
PLATELET # BLD AUTO: 200 K/UL (ref 150–400)
RBC # BLD AUTO: 3.54 M/UL (ref 3.8–5.2)
WBC # BLD AUTO: 1.9 K/UL (ref 3.6–11)

## 2019-03-15 PROCEDURE — 74011250636 HC RX REV CODE- 250/636: Performed by: INTERNAL MEDICINE

## 2019-03-15 PROCEDURE — 96413 CHEMO IV INFUSION 1 HR: CPT

## 2019-03-15 PROCEDURE — 77030012965 HC NDL HUBR BBMI -A

## 2019-03-15 PROCEDURE — 74011000250 HC RX REV CODE- 250: Performed by: INTERNAL MEDICINE

## 2019-03-15 PROCEDURE — 85025 COMPLETE CBC W/AUTO DIFF WBC: CPT

## 2019-03-15 PROCEDURE — 96375 TX/PRO/DX INJ NEW DRUG ADDON: CPT

## 2019-03-15 PROCEDURE — 36415 COLL VENOUS BLD VENIPUNCTURE: CPT

## 2019-03-15 PROCEDURE — 74011250637 HC RX REV CODE- 250/637: Performed by: INTERNAL MEDICINE

## 2019-03-15 RX ORDER — DIPHENHYDRAMINE HCL 25 MG
50 CAPSULE ORAL ONCE
Status: COMPLETED | OUTPATIENT
Start: 2019-03-15 | End: 2019-03-15

## 2019-03-15 RX ORDER — DEXAMETHASONE SODIUM PHOSPHATE 4 MG/ML
10 INJECTION, SOLUTION INTRA-ARTICULAR; INTRALESIONAL; INTRAMUSCULAR; INTRAVENOUS; SOFT TISSUE ONCE
Status: COMPLETED | OUTPATIENT
Start: 2019-03-15 | End: 2019-03-15

## 2019-03-15 RX ORDER — SODIUM CHLORIDE 9 MG/ML
10 INJECTION INTRAMUSCULAR; INTRAVENOUS; SUBCUTANEOUS AS NEEDED
Status: ACTIVE | OUTPATIENT
Start: 2019-03-15 | End: 2019-03-15

## 2019-03-15 RX ORDER — SODIUM CHLORIDE 0.9 % (FLUSH) 0.9 %
10 SYRINGE (ML) INJECTION AS NEEDED
Status: ACTIVE | OUTPATIENT
Start: 2019-03-15 | End: 2019-03-15

## 2019-03-15 RX ORDER — HEPARIN 100 UNIT/ML
300-500 SYRINGE INTRAVENOUS AS NEEDED
Status: ACTIVE | OUTPATIENT
Start: 2019-03-15 | End: 2019-03-15

## 2019-03-15 RX ORDER — SODIUM CHLORIDE 9 MG/ML
25 INJECTION, SOLUTION INTRAVENOUS CONTINUOUS
Status: DISPENSED | OUTPATIENT
Start: 2019-03-15 | End: 2019-03-15

## 2019-03-15 RX ADMIN — Medication 10 ML: at 14:30

## 2019-03-15 RX ADMIN — DIPHENHYDRAMINE HYDROCHLORIDE 50 MG: 25 CAPSULE ORAL at 12:09

## 2019-03-15 RX ADMIN — SODIUM CHLORIDE 25 ML/HR: 900 INJECTION, SOLUTION INTRAVENOUS at 12:09

## 2019-03-15 RX ADMIN — Medication 10 ML: at 11:15

## 2019-03-15 RX ADMIN — FAMOTIDINE 20 MG: 10 INJECTION, SOLUTION INTRAVENOUS at 12:09

## 2019-03-15 RX ADMIN — PACLITAXEL 115 MG: 6 INJECTION, SOLUTION INTRAVENOUS at 13:30

## 2019-03-15 RX ADMIN — DEXAMETHASONE SODIUM PHOSPHATE 10 MG: 4 INJECTION, SOLUTION INTRAMUSCULAR; INTRAVENOUS at 12:11

## 2019-03-15 RX ADMIN — Medication 500 UNITS: at 14:30

## 2019-03-15 NOTE — PROGRESS NOTES
Pt arrived to [facility] ambulatory in no acute distress at 1105 for Taxol C9.  Assessment unremarkable. R chest port accessed without issue and positive blood return noted.  Labs obtained, CBCap. Visit Vitals  /72 (BP 1 Location: Left arm, BP Patient Position: Sitting)   Pulse 92   Temp 99 °F (37.2 °C)   Resp 18   Ht 5' 5\" (1.651 m)   Wt 68.5 kg (151 lb)   SpO2 100%   BMI 25.13 kg/m²     Recent Results (from the past 12 hour(s))   CBC WITH 3 PART DIFF    Collection Time: 03/15/19 11:17 AM   Result Value Ref Range    WBC 1.9 (L) 3.6 - 11.0 K/uL    RBC 3.54 (L) 3.80 - 5.20 M/uL    HGB 11.4 (L) 11.5 - 16.0 g/dL    HCT 33.8 (L) 35.0 - 47.0 %    MCV 95.5 80.0 - 99.0 FL    MCH 32.2 26.0 - 34.0 PG    MCHC 33.7 30.0 - 36.5 g/dL    RDW 13.2 11.8 - 15.8 %    PLATELET 164 112 - 758 K/uL    NEUTROPHILS 81 (H) 32 - 75 %    MIXED CELLS 7 3.2 - 16.9 %    LYMPHOCYTES 12 12 - 49 %    ABS. NEUTROPHILS 1.6 (L) 1.8 - 8.0 K/UL    ABS. MIXED CELLS 0.1 (L) 0.2 - 1.2 K/uL    ABS. LYMPHOCYTES 0.2 (L) 0.8 - 3.5 K/UL    DF AUTOMATED       The following medications administered:  [email or web address]  Benadryl 50mg PO  Pepcid 20mg IVP  Decadron 10mg IVP  Taxol 115mg IV over 1 hour    Visit Vitals  /72   Pulse 91   Temp 99 °F (37.2 °C)   Resp 18   Ht 5' 5\" (1.651 m)   Wt 68.5 kg (151 lb)   SpO2 100%   BMI 25.13 kg/m²     Pt tolerated treatment well. Port flushed per policy and de-accessed, 2x2 and tape placed.  Pt discharged ambulatory in no acute distress at 1430, accompanied by family. Next appointment 3/22/19 at 1100.

## 2019-03-19 RX ORDER — DIPHENHYDRAMINE HCL 25 MG
50 CAPSULE ORAL ONCE
Status: CANCELLED | OUTPATIENT
Start: 2019-03-22 | End: 2019-03-22

## 2019-03-19 RX ORDER — DIPHENHYDRAMINE HCL 25 MG
50 CAPSULE ORAL ONCE
Status: CANCELLED | OUTPATIENT
Start: 2019-03-29 | End: 2019-03-29

## 2019-03-19 RX ORDER — DIPHENHYDRAMINE HCL 25 MG
50 CAPSULE ORAL ONCE
Status: CANCELLED | OUTPATIENT
Start: 2019-04-05 | End: 2019-04-05

## 2019-03-20 ENCOUNTER — OFFICE VISIT (OUTPATIENT)
Dept: SURGERY | Age: 49
End: 2019-03-20

## 2019-03-20 VITALS
HEART RATE: 87 BPM | SYSTOLIC BLOOD PRESSURE: 117 MMHG | WEIGHT: 151 LBS | BODY MASS INDEX: 25.16 KG/M2 | HEIGHT: 65 IN | DIASTOLIC BLOOD PRESSURE: 66 MMHG

## 2019-03-20 DIAGNOSIS — C50.212 MALIGNANT NEOPLASM OF UPPER-INNER QUADRANT OF LEFT FEMALE BREAST, UNSPECIFIED ESTROGEN RECEPTOR STATUS (HCC): Primary | ICD-10-CM

## 2019-03-20 NOTE — PROGRESS NOTES
HISTORY OF PRESENT ILLNESS  Bere Shahid is a 54 y.o. female. HPI ESTABLISHED patient here for follow up during neoadjuvant chemotherapy for LEFT breast cancer. Not able to feel enlarged lymph node. She has 3 more treatments of Taxol left and due to complete chemo the first week of April. Left breast carcinoma:  Stage 3 infiltrating ductal carcinoma, Tumor size 0.8 mm, LN -v3, grade 3, ER 0, CT 15%, Her 2 -ve. High risk mammaprint. + supraclav node  Enlarged supraclavicular LN on the same side which biopsy showed invasive breast cancer. Pet avid supraclav and IM nodes on left  11/8/18- port-a-cath insertion  11/14/2018 - neoadjuvant chemotherapy started - Dr. Mouna Mas    Breast imaging-  LEFT breast MRI 10/2018  Bilateral breast MRI 9/2018  LEFT breast diagnostic mammogram 9/2018  Screening mammogram 8/2018    Review of Systems   All other systems reviewed and are negative. Physical Exam   Pulmonary/Chest:   No palpable mass left breast  No palpable adenopathy   Nursing note and vitals reviewed.       ASSESSMENT and PLAN    ICD-10-CM ICD-9-CM    1. Malignant neoplasm of upper-inner quadrant of left female breast, unspecified estrogen receptor status ([facility] [address] C50.212 174.2 MRI BREAST BI W WO CONT     - 55 yo female with Stage 3 left breast IDC   Pet avid im and supraclav nodes on left  So far everything responding to chemo  Will check post merly mri  Surgical plan is left us guided lumpectomy, sln biopsy  Will need post surgery xrt  Refer to dr. Rima Jones

## 2019-03-20 NOTE — PATIENT INSTRUCTIONS
Lumpectomy: Before Your Surgery  What is a lumpectomy? A lumpectomy is surgery to remove cancer from the breast. Your doctor will make a small cut (incision) and take out the cancer. The whole breast will not be removed. The doctor will try to also take a small amount of normal tissue around the cancer. This is known as \"getting clear margins. \" Some people will need another surgery to be sure the margins are clear. The doctor may also check the nearby lymph nodes during the surgery. After a lumpectomy, you will probably go home the same day. Most people can go back to work or their normal routine in 1 to 3 weeks. This depends on how you feel. It also depends on the type of work you do and whether you need more treatment. This may include radiation or chemotherapy. Most people who have a lumpectomy for cancer also get radiation treatment. The surgery will leave scars. Sometimes it leaves a dent in the breast too. Most women will look normal in a bra. But your breasts may not match in size or shape after surgery. This depends on the size of your breasts. It also depends on how much tissue was removed. When you find out that you have cancer, you may feel many emotions and may need some help coping. Seek out family, friends, and counselors for support. You also can do things at home to make yourself feel better while you go through treatment. Call the [facility] ([phone]) or visit its website at [email or web address] [employer] for more information. Follow-up care is a key part of your treatment and safety. Be sure to make and go to all appointments, and call your doctor if you are having problems. It's also a good idea to know your test results and keep a list of the medicines you take. What happens before surgery?   Surgery can be stressful. This information will help you understand what you can expect.  And it will help you safely prepare for surgery.   Preparing for surgery    · Understand exactly what surgery is planned, along with the risks, benefits, and other options. · Tell your doctors ALL the medicines, vitamins, supplements, and herbal remedies you take. Some of these can increase the risk of bleeding or interact with anesthesia.     · If you take blood thinners, such as warfarin (Coumadin), clopidogrel (Plavix), or aspirin, be sure to talk to your doctor. He or she will tell you if you should stop taking these medicines before your surgery. Make sure that you understand exactly what your doctor wants you to do.     · Your doctor will tell you which medicines to take or stop before your surgery. You may need to stop taking certain medicines a week or more before surgery. So talk to your doctor as soon as you can.     · If you have an advance directive, let your doctor know. It may include a living will and a durable power of  for health care. Bring a copy to the hospital. If you don't have one, you may want to prepare one. It lets your doctor and loved ones know your health care wishes. Doctors advise that everyone prepare these papers before any type of surgery or procedure. What happens on the day of surgery? · Follow the instructions exactly about when to stop eating and drinking. If you don't, your surgery may be canceled. If your doctor told you to take your medicines on the day of surgery, take them with only a sip of water.     · Take a bath or shower before you come in for your surgery. Do not apply lotions, perfumes, deodorants, or nail polish.     · Do not shave the surgical site yourself.     · Take off all jewelry and piercings. And take out contact lenses, if you wear them.     · Bring a comfortable, supportive bra with you. You will need to wear this all the time, even during the night, for the first week after surgery.    At the hospital or surgery center   · Bring a picture ID.     · The area for surgery is often marked to make sure there are no errors.  If your doctor can't feel the lump, a needle can be put in the suspicious area. This may be done during a mammogram just before surgery. The needle will guide your doctor.     · You will be kept comfortable and safe by your anesthesia provider. The anesthesia may make you sleep. Or it may just numb the area being worked on.     · The surgery will take about 1 hour or longer, depending on the size of the lump. Going home   · Be sure you have someone to drive you home. Anesthesia and pain medicine make it unsafe for you to drive.     · You will be given more specific instructions about recovering from your surgery. They will cover things like diet, wound care, follow-up care, driving, and getting back to your normal routine. When should you call your doctor? · You have questions or concerns.     · You don't understand how to prepare for your surgery.     · You become ill before the surgery (such as fever, flu, or a cold).     · You need to reschedule or have changed your mind about having the surgery. Where can you learn more? Go to http://mark-monique.info/. Enter ([phone] [phone] in the search box to learn more about \"Lumpectomy: Before Your Surgery. \"  Current as of: March 27, 2018  Content Version: 11.9  © 4924-1674 [employer]. Care instructions adapted under license by [employer] (which disclaims liability or warranty for this information). If you have questions about a medical condition or this instruction, always ask your healthcare professional. [place] [address] any warranty or liability for your use of this information.

## 2019-03-21 DIAGNOSIS — C50.212 MALIGNANT NEOPLASM OF UPPER-INNER QUADRANT OF LEFT FEMALE BREAST, UNSPECIFIED ESTROGEN RECEPTOR STATUS (HCC): Primary | ICD-10-CM

## 2019-03-22 ENCOUNTER — HOSPITAL ENCOUNTER (OUTPATIENT)
Dept: INFUSION THERAPY | Age: 49
Discharge: HOME | End: 2019-03-22
Payer: MEDICAID

## 2019-03-22 VITALS
SYSTOLIC BLOOD PRESSURE: 113 MMHG | WEIGHT: 151.9 LBS | BODY MASS INDEX: 25.31 KG/M2 | TEMPERATURE: 97 F | HEIGHT: 65 IN | DIASTOLIC BLOOD PRESSURE: 70 MMHG | RESPIRATION RATE: 18 BRPM | OXYGEN SATURATION: 99 % | HEART RATE: 108 BPM

## 2019-03-22 DIAGNOSIS — C50.212 MALIGNANT NEOPLASM OF UPPER-INNER QUADRANT OF LEFT BREAST IN FEMALE, ESTROGEN RECEPTOR NEGATIVE (HCC): Primary | ICD-10-CM

## 2019-03-22 DIAGNOSIS — Z17.1 MALIGNANT NEOPLASM OF UPPER-INNER QUADRANT OF LEFT BREAST IN FEMALE, ESTROGEN RECEPTOR NEGATIVE (HCC): Primary | ICD-10-CM

## 2019-03-22 LAB
ALBUMIN SERPL-MCNC: 3.4 G/DL (ref 3.5–5)
ALBUMIN/GLOB SERPL: 0.9 {RATIO} (ref 1.1–2.2)
ALP SERPL-CCNC: 116 U/L (ref 45–117)
ALT SERPL-CCNC: 77 U/L (ref 12–78)
ANION GAP SERPL CALC-SCNC: 5 MMOL/L (ref 5–15)
AST SERPL-CCNC: 42 U/L (ref 15–37)
BASOPHILS # BLD: 0 K/UL (ref 0–0.1)
BASOPHILS NFR BLD: 0 % (ref 0–1)
BILIRUB SERPL-MCNC: 0.3 MG/DL (ref 0.2–1)
BUN SERPL-MCNC: 17 MG/DL (ref 6–20)
BUN/CREAT SERPL: 23 (ref 12–20)
CALCIUM SERPL-MCNC: 9 MG/DL (ref 8.5–10.1)
CHLORIDE SERPL-SCNC: 106 MMOL/L (ref 97–108)
CO2 SERPL-SCNC: 29 MMOL/L (ref 21–32)
CREAT SERPL-MCNC: 0.73 MG/DL (ref 0.55–1.02)
DIFFERENTIAL METHOD BLD: ABNORMAL
EOSINOPHIL # BLD: 0 K/UL (ref 0–0.4)
EOSINOPHIL NFR BLD: 0 % (ref 0–7)
ERYTHROCYTE [DISTWIDTH] IN BLOOD BY AUTOMATED COUNT: 13 % (ref 11.5–14.5)
GLOBULIN SER CALC-MCNC: 3.6 G/DL (ref 2–4)
GLUCOSE SERPL-MCNC: 105 MG/DL (ref 65–100)
HCT VFR BLD AUTO: 36.2 % (ref 35–47)
HGB BLD-MCNC: 12.2 G/DL (ref 11.5–16)
IMM GRANULOCYTES # BLD AUTO: 0 K/UL (ref 0–0.04)
IMM GRANULOCYTES NFR BLD AUTO: 1 % (ref 0–0.5)
LYMPHOCYTES # BLD: 0.2 K/UL (ref 0.8–3.5)
LYMPHOCYTES NFR BLD: 8 % (ref 12–49)
MCH RBC QN AUTO: 32.1 PG (ref 26–34)
MCHC RBC AUTO-ENTMCNC: 33.7 G/DL (ref 30–36.5)
MCV RBC AUTO: 95.3 FL (ref 80–99)
MONOCYTES # BLD: 0.1 K/UL (ref 0–1)
MONOCYTES NFR BLD: 4 % (ref 5–13)
NEUTS SEG # BLD: 2.8 K/UL (ref 1.8–8)
NEUTS SEG NFR BLD: 87 % (ref 32–75)
NRBC # BLD: 0 K/UL (ref 0–0.01)
NRBC BLD-RTO: 0 PER 100 WBC
PLATELET # BLD AUTO: 208 K/UL (ref 150–400)
PMV BLD AUTO: 10.5 FL (ref 8.9–12.9)
POTASSIUM SERPL-SCNC: 4 MMOL/L (ref 3.5–5.1)
PROT SERPL-MCNC: 7 G/DL (ref 6.4–8.2)
RBC # BLD AUTO: 3.8 M/UL (ref 3.8–5.2)
RBC MORPH BLD: ABNORMAL
SODIUM SERPL-SCNC: 140 MMOL/L (ref 136–145)
WBC # BLD AUTO: 3.1 K/UL (ref 3.6–11)

## 2019-03-22 PROCEDURE — 77030012965 HC NDL HUBR BBMI -A

## 2019-03-22 PROCEDURE — 74011000250 HC RX REV CODE- 250: Performed by: INTERNAL MEDICINE

## 2019-03-22 PROCEDURE — 96375 TX/PRO/DX INJ NEW DRUG ADDON: CPT

## 2019-03-22 PROCEDURE — 74011250636 HC RX REV CODE- 250/636: Performed by: INTERNAL MEDICINE

## 2019-03-22 PROCEDURE — 74011250637 HC RX REV CODE- 250/637: Performed by: INTERNAL MEDICINE

## 2019-03-22 PROCEDURE — 96413 CHEMO IV INFUSION 1 HR: CPT

## 2019-03-22 PROCEDURE — 80053 COMPREHEN METABOLIC PANEL: CPT

## 2019-03-22 PROCEDURE — 36415 COLL VENOUS BLD VENIPUNCTURE: CPT

## 2019-03-22 PROCEDURE — 85025 COMPLETE CBC W/AUTO DIFF WBC: CPT

## 2019-03-22 RX ORDER — HEPARIN 100 UNIT/ML
300-500 SYRINGE INTRAVENOUS AS NEEDED
Status: ACTIVE | OUTPATIENT
Start: 2019-03-22 | End: 2019-03-22

## 2019-03-22 RX ORDER — DIPHENHYDRAMINE HCL 25 MG
50 CAPSULE ORAL ONCE
Status: COMPLETED | OUTPATIENT
Start: 2019-03-22 | End: 2019-03-22

## 2019-03-22 RX ORDER — DEXAMETHASONE SODIUM PHOSPHATE 4 MG/ML
10 INJECTION, SOLUTION INTRA-ARTICULAR; INTRALESIONAL; INTRAMUSCULAR; INTRAVENOUS; SOFT TISSUE ONCE
Status: COMPLETED | OUTPATIENT
Start: 2019-03-22 | End: 2019-03-22

## 2019-03-22 RX ORDER — SODIUM CHLORIDE 9 MG/ML
10 INJECTION INTRAMUSCULAR; INTRAVENOUS; SUBCUTANEOUS AS NEEDED
Status: DISPENSED | OUTPATIENT
Start: 2019-03-22 | End: 2019-03-22

## 2019-03-22 RX ORDER — SODIUM CHLORIDE 9 MG/ML
25 INJECTION, SOLUTION INTRAVENOUS CONTINUOUS
Status: DISCONTINUED | OUTPATIENT
Start: 2019-03-22 | End: 2019-03-23 | Stop reason: HOSPADM

## 2019-03-22 RX ORDER — SODIUM CHLORIDE 0.9 % (FLUSH) 0.9 %
10 SYRINGE (ML) INJECTION AS NEEDED
Status: ACTIVE | OUTPATIENT
Start: 2019-03-22 | End: 2019-03-22

## 2019-03-22 RX ADMIN — SODIUM CHLORIDE 25 ML/HR: 900 INJECTION, SOLUTION INTRAVENOUS at 13:15

## 2019-03-22 RX ADMIN — FAMOTIDINE 20 MG: 10 INJECTION, SOLUTION INTRAVENOUS at 13:17

## 2019-03-22 RX ADMIN — PACLITAXEL 115 MG: 6 INJECTION, SOLUTION INTRAVENOUS at 13:57

## 2019-03-22 RX ADMIN — Medication 10 ML: at 15:13

## 2019-03-22 RX ADMIN — Medication 500 UNITS: at 15:13

## 2019-03-22 RX ADMIN — DEXAMETHASONE SODIUM PHOSPHATE 10 MG: 4 INJECTION, SOLUTION INTRA-ARTICULAR; INTRALESIONAL; INTRAMUSCULAR; INTRAVENOUS; SOFT TISSUE at 13:16

## 2019-03-22 RX ADMIN — DIPHENHYDRAMINE HYDROCHLORIDE 50 MG: 25 CAPSULE ORAL at 13:16

## 2019-03-22 NOTE — PROGRESS NOTES
[facility] Progress Note    Date: 2019    Name: Primo Rajan    MRN: [id number]         : 1970    Ms. Anupama Webster Arrived ambulatory and in no distress for cycle 8 day 1 of Taxol regimen. Assessment was completed, no acute issues at this time, no new complaints voiced. R chest PAC  accessed without difficulty, labs drawn and in process. Chemotherapy Flowsheet 3/22/2019   Cycle C8D1   Date 3/22/2019   Drug / Regimen Taxol   Dosage -   Time Up -   Time Down -   Pre Hydration -   Post Hydration -   Pre Meds given   Notes given       Ms. Garcia's vitals were reviewed. Visit Vitals  /70   Pulse (!) 108   Temp 97 °F (36.1 °C)   Resp 18   Ht 5' 5\" (1.651 m)   Wt 68.9 kg (151 lb 14.4 oz)   SpO2 99%   BMI 25.28 kg/m²       Lab results were obtained and reviewed. Recent Results (from the past 12 hour(s))   CBC WITH AUTOMATED DIFF    Collection Time: 19 11:33 AM   Result Value Ref Range    WBC 3.1 (L) 3.6 - 11.0 K/uL    RBC 3.80 3.80 - 5.20 M/uL    HGB 12.2 11.5 - 16.0 g/dL    HCT 36.2 35.0 - 47.0 %    MCV 95.3 80.0 - 99.0 FL    MCH 32.1 26.0 - 34.0 PG    MCHC 33.7 30.0 - 36.5 g/dL    RDW 13.0 11.5 - 14.5 %    PLATELET 633 286 - 891 K/uL    MPV 10.5 8.9 - 12.9 FL    NRBC 0.0 0  WBC    ABSOLUTE NRBC 0.00 0.00 - 0.01 K/uL    NEUTROPHILS 87 (H) 32 - 75 %    LYMPHOCYTES 8 (L) 12 - 49 %    MONOCYTES 4 (L) 5 - 13 %    EOSINOPHILS 0 0 - 7 %    BASOPHILS 0 0 - 1 %    IMMATURE GRANULOCYTES 1 (H) 0.0 - 0.5 %    ABS. NEUTROPHILS 2.8 1.8 - 8.0 K/UL    ABS. LYMPHOCYTES 0.2 (L) 0.8 - 3.5 K/UL    ABS. MONOCYTES 0.1 0.0 - 1.0 K/UL    ABS. EOSINOPHILS 0.0 0.0 - 0.4 K/UL    ABS. BASOPHILS 0.0 0.0 - 0.1 K/UL    ABS. IMM.  GRANS. 0.0 0.00 - 0.04 K/UL    DF AUTOMATED      RBC COMMENTS NORMOCYTIC, NORMOCHROMIC     METABOLIC PANEL, COMPREHENSIVE    Collection Time: 19 11:33 AM   Result Value Ref Range    Sodium 140 136 - 145 mmol/L    Potassium 4.0 3.5 - 5.1 mmol/L    Chloride 106 97 - 108 mmol/L    CO2 29 21 - 32 mmol/L    Anion gap 5 5 - 15 mmol/L    Glucose 105 (H) 65 - 100 mg/dL    BUN 17 6 - 20 MG/DL    Creatinine 0.73 0.55 - 1.02 MG/DL    BUN/Creatinine ratio 23 (H) 12 - 20      GFR est AA >60 >60 ml/min/1.73m2    GFR est non-AA >60 >60 ml/min/1.73m2    Calcium 9.0 8.5 - 10.1 MG/DL    Bilirubin, total 0.3 0.2 - 1.0 MG/DL    ALT (SGPT) 77 12 - 78 U/L    AST (SGOT) 42 (H) 15 - 37 U/L    Alk. phosphatase 116 45 - 117 U/L    Protein, total 7.0 6.4 - 8.2 g/dL    Albumin 3.4 (L) 3.5 - 5.0 g/dL    Globulin 3.6 2.0 - 4.0 g/dL    A-G Ratio 0.9 (L) 1.1 - 2.2         Pre-medications  were administered as ordered and chemotherapy was initiated. NS 25cc/hr  Decadron 10mg IV  Benadryl 50mg PO  Pepcid 20mg IV  Taxol 115mg IV infusion over 1 hr      Ms. Sandra Keating tolerated treatment well and was discharged from [place] [address] in stable condition at 1515. She is to return on 3/29/19 at 1100 for her next appointment.     Alfredo Hugo  March 22, 2019

## 2019-03-29 ENCOUNTER — OFFICE VISIT (OUTPATIENT)
Dept: ONCOLOGY | Age: 49
End: 2019-03-29

## 2019-03-29 ENCOUNTER — HOSPITAL ENCOUNTER (OUTPATIENT)
Dept: INFUSION THERAPY | Age: 49
Discharge: HOME | End: 2019-03-29
Payer: MEDICAID

## 2019-03-29 VITALS
SYSTOLIC BLOOD PRESSURE: 124 MMHG | HEART RATE: 97 BPM | DIASTOLIC BLOOD PRESSURE: 81 MMHG | WEIGHT: 156 LBS | HEIGHT: 65 IN | OXYGEN SATURATION: 98 % | BODY MASS INDEX: 25.99 KG/M2 | TEMPERATURE: 97.5 F | RESPIRATION RATE: 18 BRPM

## 2019-03-29 VITALS
BODY MASS INDEX: 26.01 KG/M2 | HEART RATE: 99 BPM | WEIGHT: 156.1 LBS | DIASTOLIC BLOOD PRESSURE: 88 MMHG | HEIGHT: 65 IN | TEMPERATURE: 98 F | OXYGEN SATURATION: 100 % | RESPIRATION RATE: 16 BRPM | SYSTOLIC BLOOD PRESSURE: 134 MMHG

## 2019-03-29 DIAGNOSIS — C50.212 MALIGNANT NEOPLASM OF UPPER-INNER QUADRANT OF LEFT BREAST IN FEMALE, ESTROGEN RECEPTOR NEGATIVE (HCC): Primary | ICD-10-CM

## 2019-03-29 DIAGNOSIS — Z17.1 MALIGNANT NEOPLASM OF UPPER-INNER QUADRANT OF LEFT BREAST IN FEMALE, ESTROGEN RECEPTOR NEGATIVE (HCC): Primary | ICD-10-CM

## 2019-03-29 DIAGNOSIS — D64.81 ANEMIA ASSOCIATED WITH CHEMOTHERAPY: ICD-10-CM

## 2019-03-29 DIAGNOSIS — T45.1X5A ANEMIA ASSOCIATED WITH CHEMOTHERAPY: ICD-10-CM

## 2019-03-29 LAB
BASO+EOS+MONOS # BLD AUTO: 0 K/UL (ref 0.2–1.2)
BASO+EOS+MONOS NFR BLD AUTO: 2 % (ref 3.2–16.9)
DIFFERENTIAL METHOD BLD: ABNORMAL
ERYTHROCYTE [DISTWIDTH] IN BLOOD BY AUTOMATED COUNT: 13.1 % (ref 11.8–15.8)
HCT VFR BLD AUTO: 35.7 % (ref 35–47)
HGB BLD-MCNC: 11.9 G/DL (ref 11.5–16)
LYMPHOCYTES # BLD: 0.3 K/UL (ref 0.8–3.5)
LYMPHOCYTES NFR BLD: 11 % (ref 12–49)
MCH RBC QN AUTO: 31.4 PG (ref 26–34)
MCHC RBC AUTO-ENTMCNC: 33.3 G/DL (ref 30–36.5)
MCV RBC AUTO: 94.2 FL (ref 80–99)
NEUTS SEG # BLD: 2.1 K/UL (ref 1.8–8)
NEUTS SEG NFR BLD: 87 % (ref 32–75)
PLATELET # BLD AUTO: 220 K/UL (ref 150–400)
RBC # BLD AUTO: 3.79 M/UL (ref 3.8–5.2)
WBC # BLD AUTO: 2.4 K/UL (ref 3.6–11)

## 2019-03-29 PROCEDURE — 74011250636 HC RX REV CODE- 250/636: Performed by: INTERNAL MEDICINE

## 2019-03-29 PROCEDURE — 74011000250 HC RX REV CODE- 250: Performed by: INTERNAL MEDICINE

## 2019-03-29 PROCEDURE — 96375 TX/PRO/DX INJ NEW DRUG ADDON: CPT

## 2019-03-29 PROCEDURE — 96413 CHEMO IV INFUSION 1 HR: CPT

## 2019-03-29 PROCEDURE — 74011250637 HC RX REV CODE- 250/637: Performed by: INTERNAL MEDICINE

## 2019-03-29 PROCEDURE — 85025 COMPLETE CBC W/AUTO DIFF WBC: CPT

## 2019-03-29 PROCEDURE — 36415 COLL VENOUS BLD VENIPUNCTURE: CPT

## 2019-03-29 PROCEDURE — 77030012965 HC NDL HUBR BBMI -A

## 2019-03-29 RX ORDER — DEXAMETHASONE SODIUM PHOSPHATE 4 MG/ML
10 INJECTION, SOLUTION INTRA-ARTICULAR; INTRALESIONAL; INTRAMUSCULAR; INTRAVENOUS; SOFT TISSUE ONCE
Status: COMPLETED | OUTPATIENT
Start: 2019-03-29 | End: 2019-03-29

## 2019-03-29 RX ORDER — SODIUM CHLORIDE 9 MG/ML
10 INJECTION INTRAMUSCULAR; INTRAVENOUS; SUBCUTANEOUS AS NEEDED
Status: DISPENSED | OUTPATIENT
Start: 2019-03-29 | End: 2019-03-29

## 2019-03-29 RX ORDER — DIPHENHYDRAMINE HCL 25 MG
50 CAPSULE ORAL ONCE
Status: COMPLETED | OUTPATIENT
Start: 2019-03-29 | End: 2019-03-29

## 2019-03-29 RX ORDER — SODIUM CHLORIDE 9 MG/ML
25 INJECTION, SOLUTION INTRAVENOUS CONTINUOUS
Status: DISPENSED | OUTPATIENT
Start: 2019-03-29 | End: 2019-03-29

## 2019-03-29 RX ORDER — SODIUM CHLORIDE 0.9 % (FLUSH) 0.9 %
10 SYRINGE (ML) INJECTION AS NEEDED
Status: ACTIVE | OUTPATIENT
Start: 2019-03-29 | End: 2019-03-29

## 2019-03-29 RX ORDER — HEPARIN 100 UNIT/ML
300-500 SYRINGE INTRAVENOUS AS NEEDED
Status: ACTIVE | OUTPATIENT
Start: 2019-03-29 | End: 2019-03-29

## 2019-03-29 RX ADMIN — SODIUM CHLORIDE 25 ML/HR: 900 INJECTION, SOLUTION INTRAVENOUS at 12:26

## 2019-03-29 RX ADMIN — Medication 10 ML: at 13:55

## 2019-03-29 RX ADMIN — Medication 10 ML: at 11:50

## 2019-03-29 RX ADMIN — Medication 500 UNITS: at 13:55

## 2019-03-29 RX ADMIN — DEXAMETHASONE SODIUM PHOSPHATE 10 MG: 4 INJECTION, SOLUTION INTRA-ARTICULAR; INTRALESIONAL; INTRAMUSCULAR; INTRAVENOUS; SOFT TISSUE at 12:30

## 2019-03-29 RX ADMIN — DIPHENHYDRAMINE HYDROCHLORIDE 50 MG: 25 CAPSULE ORAL at 12:25

## 2019-03-29 RX ADMIN — SODIUM CHLORIDE 10 ML: 9 INJECTION INTRAMUSCULAR; INTRAVENOUS; SUBCUTANEOUS at 11:50

## 2019-03-29 RX ADMIN — FAMOTIDINE 20 MG: 10 INJECTION, SOLUTION INTRAVENOUS at 12:27

## 2019-03-29 RX ADMIN — PACLITAXEL 115 MG: 6 INJECTION, SOLUTION INTRAVENOUS at 12:50

## 2019-03-29 NOTE — PROGRESS NOTES
[address], [address] [place] [address] Be Vanceu, [address] [facility]  [phone]      Follow-up Note        Patient: Bere Shahid MRN: [id number]  SSN: [id number]    YOB: 1970  Age: 54 y.o. Sex: female      Diagnosis:     1. Left breast carcinoma: Dx: 9/17/2018  T1b N3a M0 (Stage IIIC) infiltrating ductal carcinoma, Tumor size 0.8 mm, LN +ve, grade 3, ER 0, AL 15%, Her 2 -ve     Treatment:     1. Neoadjuvant chemotherapy   Adriamycin + Cytoxan - s/p 4 cycles - completed 12/26/2108              Taxol week 11 of 12    Subjective:      Bere Shahid is a 54 y.o. female with a new diagnosis of left sided invasive breast carcinoma. She underwent a screening mammogram and was noted to have a density in the upper inner quadrant of the left breast. A biopsy of the lesion showed a diagnosis of invasive breast carcinoma ER -ve AL 15% and Her 2 negative. An MRI did not reveal any additional abnormalities. She has an enlarged supraclavicular LN on the same side which biopsy showed invasive breast cancer. She is receiving neoadjuvant chemotherapy. Taxol was dose reduced 20% d/t neuropathy. She is feeling well with no complaints.        Review of Systems:    Constitutional: fatigue  Eyes: negative  Ears, Nose, Mouth, Throat, and Face: negative  Respiratory: negative  Cardiovascular: negative  Gastrointestinal: nausea  Genitourinary:negative  Integument/Breast: negative  Hematologic/Lymphatic: palpable left neck mass  Musculoskeletal:negative  Neurological: neuropathy of finger tips and feet        Past Medical History:   Diagnosis Date    Frequent headaches     Migraine     22 years     Past Surgical History:   Procedure Laterality Date    HX DILATION AND CURETTAGE      2010      Family History   Problem Relation Age of Onset    Headache Mother     Headache Maternal Aunt      Social History     Tobacco Use    Smoking status: Never Smoker    Smokeless tobacco: Never Used   Substance Use Topics    Alcohol use: No     Comment: social      Prior to Admission medications    Medication Sig Start Date End Date Taking? Authorizing Provider   dexamethasone (DECADRON) 4 mg tablet Take 20 mg by mouth every seven (7) days. 12/26/18  Yes Mel Zamora NP   ondansetron (ZOFRAN ODT) 4 mg disintegrating tablet Take 1 Tab by mouth every eight (8) hours as needed for Nausea. 12/26/18  Yes Mayo Noland MD   VIRTUSSIN AC  mg/5 mL solution TK 5ML TO 10ML PO Q 4 H PRF COUGH 12/7/18  Yes Provider, Historical   oxyCODONE-acetaminophen (PERCOCET) 5-325 mg per tablet Take 1 Tab by mouth every four (4) hours as needed for Pain. Max Daily Amount: 6 Tabs. 11/8/18  Yes Maryann De La Vega MD   prochlorperazine (COMPAZINE) 10 mg tablet Take 1 Tab by mouth every six (6) hours as needed for up to 30 doses. 11/2/18  Yes Mel Zamora NP   lidocaine-prilocaine (EMLA) topical cream Apply  to affected area as needed for Pain. 11/2/18  Yes Myron YOUNG NP   MULTIVITAMIN PO Take  by mouth. Yes Provider, Historical   ergocalciferol, vitamin D2, (VITAMIN D2 PO) Take  by mouth. Yes Provider, Historical   SUMAtriptan succinate (ZEMBRACE SYMTOUCH) 3 mg/0.5 mL pnij 1 Syringe by SubCUTAneous route as needed. 1 at HA onset and repeat q 1 hour until WHELAN relieved or until used 4 in 24 hours 10/7/18  Yes Sharlene Ruano NP   cyclobenzaprine (FLEXERIL) 10 mg tablet TK 1 T PO TID PRN 3/13/18  Yes Provider, Historical   SUMAtriptan (IMITREX) 100 mg tablet Take 1tab at the onset of HA Repeat in 2 hours.  MAX 2 tabs  in 24hrs 3/22/18  Yes Sharlene Ruano NP          Allergies   Allergen Reactions    Latex Itching     Skin gets very dry         Objective:     Visit Vitals  /81 (BP 1 Location: Left arm, BP Patient Position: Sitting)   Pulse 97   Temp 97.5 °F (36.4 °C) (Oral)   Resp 18   Ht 5' 5\" (1.651 m)   Wt 156 lb (70.8 kg)   SpO2 98%   BMI 25.96 kg/m²       Pain Scale: 0 - No pain/10  Pain Location:       Physical Exam:    GENERAL: alert, cooperative, no distress, appears stated age  EYE: negative  LYMPHATIC: left supraclavicular/lower cervical node feels smaller in size  THROAT & NECK: normal and no erythema or exudates noted. LUNG: clear to auscultation bilaterally  HEART: regular rate and rhythm  ABDOMEN: soft, non-tender  EXTREMITIES:  no edema  SKIN: no rash  NEUROLOGIC: negative      Lab Results   Component Value Date/Time    WBC 2.4 (L) 03/29/2019 11:43 AM    HGB 11.9 03/29/2019 11:43 AM    HCT 35.7 03/29/2019 11:43 AM    PLATELET 835 34/23/7494 11:43 AM    MCV 94.2 03/29/2019 11:43 AM       Lab Results   Component Value Date/Time    Sodium 140 03/22/2019 11:33 AM    Potassium 4.0 03/22/2019 11:33 AM    Chloride 106 03/22/2019 11:33 AM    CO2 29 03/22/2019 11:33 AM    Anion gap 5 03/22/2019 11:33 AM    Glucose 105 (H) 03/22/2019 11:33 AM    BUN 17 03/22/2019 11:33 AM    Creatinine 0.73 03/22/2019 11:33 AM    BUN/Creatinine ratio 23 (H) 03/22/2019 11:33 AM    GFR est AA >60 03/22/2019 11:33 AM    GFR est non-AA >60 03/22/2019 11:33 AM    Calcium 9.0 03/22/2019 11:33 AM    Bilirubin, total 0.3 03/22/2019 11:33 AM    AST (SGOT) 42 (H) 03/22/2019 11:33 AM    Alk. phosphatase 116 03/22/2019 11:33 AM    Protein, total 7.0 03/22/2019 11:33 AM    Albumin 3.4 (L) 03/22/2019 11:33 AM    Globulin 3.6 03/22/2019 11:33 AM    A-G Ratio 0.9 (L) 03/22/2019 11:33 AM    ALT (SGPT) 77 03/22/2019 11:33 AM         Assessment:     1. Left breast carcinoma:  T1b N3a M0 (Stage IIIC) infiltrating ductal carcinoma, Tumor size 0.8 mm, LN +ve, grade 3, ER 0, AR 15%, Her 2 -ve      ECOG PS 0  Intent of Treatment - curative  Prognosis- excellent    LVEF - 54% - 11/9/2018. Repeat 1/4/2019 55%    PET - 11/7/2018 - mildly hypermetabolic left supraclavicular and left MICHAEL lymph nodes.     Receiving neoadjuvant chemotherapy   Adriamycin/Cyclophosphamide completed 4 cycles - 12/26/2018 Taxol week 11 of 12    Tolerating treatment  A detailed system by system evaluation of side effect was performed to assess chemotherapy related toxicity. Blood counts are acceptable. Results reviewed with the patient. Symptom management form reviewed with patient. Responding to the treatment     Today she is completing neoadjuvant chemotherapy. She will undergo breast surgery in the neat future. 2. Anemia related to chemotherapy     Observation      3. Chemotherapy induced nausea    Taking nausea meds  Reports nausea for 4-5 days after chemotherapy      4. Skin irritation - improved    Bilaterally on hands  Increase lotion use with eucerin      5. Neuropathy, mild/minimal    Finger tips and feet  Dose reduce Paclitaxel 20% starting week #7      Plan:       · Continue with last dose of Taxol   · Moisturize hands liberally  · Proceed with breast surgery   · Follow-up in 9 weeks      Signed by: Vicki Peters MD                     March 30, 2019        CC. Jorge Torres MD  CC.  Laurine Skiff, MD

## 2019-03-29 NOTE — PROGRESS NOTES
Vaqzuez Hoyt a 57 y.o. female here today for left sided breast cancer f/u. . S/P 4 cycles of AC. Pt. receiving Taxol; cycle 8. VS stable. Patient taking ABT for URI. Patient denies pain. Good appetite. Patient denies N/V/D and constipation. Patient reports  numbness and tingling in her hands and the bottom of her feet; numbness and tingling in hands has decreased. Patient denies mouth ulcers.      Visit Vitals  /81 (BP 1 Location: Left arm, BP Patient Position: Sitting)   Pulse 97   Temp 97.5 °F (36.4 °C) (Oral)   Resp 18   Ht 5' 5\" (1.651 m)   Wt 156 lb (70.8 kg)   SpO2 98%   BMI 25.96 kg/m²       Pain Scale: 0 - No pain/10  Pain Location:

## 2019-03-29 NOTE — PROGRESS NOTES
[facility] - Chemotherapy Progress Note    1140- Pt admit to [facility] for C8D8 Taxol ambulatory in stable condition. Assessment completed. No new concerns voiced other than pt reports sinus infection and is taking abx. Right chest port accessed without issue with positive blood return. Labs drawn per order and sent. Line flushed, clamped, Curos Cap applied to end clave. Pt over to MD office. Patient Vitals for the past 12 hrs:   Temp Pulse Resp BP SpO2   03/29/19 1351 -- 99 -- 134/88 --   03/29/19 1142 98 °F (36.7 °C) 94 16 124/79 100 %       Recent Results (from the past 12 hour(s))   CBC WITH 3 PART DIFF    Collection Time: 03/29/19 11:43 AM   Result Value Ref Range    WBC 2.4 (L) 3.6 - 11.0 K/uL    RBC 3.79 (L) 3.80 - 5.20 M/uL    HGB 11.9 11.5 - 16.0 g/dL    HCT 35.7 35.0 - 47.0 %    MCV 94.2 80.0 - 99.0 FL    MCH 31.4 26.0 - 34.0 PG    MCHC 33.3 30.0 - 36.5 g/dL    RDW 13.1 11.8 - 15.8 %    PLATELET 858 504 - 574 K/uL    NEUTROPHILS 87 (H) 32 - 75 %    MIXED CELLS 2 (L) 3.2 - 16.9 %    LYMPHOCYTES 11 (L) 12 - 49 %    ABS. NEUTROPHILS 2.1 1.8 - 8.0 K/UL    ABS. MIXED CELLS 0.0 (L) 0.2 - 1.2 K/uL    ABS. LYMPHOCYTES 0.3 (L) 0.8 - 3.5 K/UL    DF AUTOMATED       Medications:  NS KVO  Benadryl PO  Pepcid IVP  Decadron IVP  Taxol IV    1355- Pt tolerated treatment well. Port maintained positive blood return throughout treatment, flushed with positive blood return at conclusion, and de-accessed. D/c home ambulatory in no distress.  Pt aware of next [facility] appointment scheduled for 4/5 at 11 AM.

## 2019-04-04 RX ORDER — DEXAMETHASONE 4 MG/1
TABLET ORAL
Qty: 35 TAB | Refills: 0 | Status: SHIPPED | OUTPATIENT
Start: 2019-04-04 | End: 2019-04-25 | Stop reason: ALTCHOICE

## 2019-04-04 NOTE — TELEPHONE ENCOUNTER
marie Ferris, refill Decadron 4 mg tabs, take 20 mg the night before chemotherapy weekly, disp 35, no refills

## 2019-04-05 ENCOUNTER — HOSPITAL ENCOUNTER (OUTPATIENT)
Dept: INFUSION THERAPY | Age: 49
Discharge: HOME | End: 2019-04-05
Payer: MEDICAID

## 2019-04-05 VITALS
HEART RATE: 109 BPM | WEIGHT: 152.5 LBS | OXYGEN SATURATION: 100 % | BODY MASS INDEX: 25.41 KG/M2 | RESPIRATION RATE: 16 BRPM | DIASTOLIC BLOOD PRESSURE: 81 MMHG | SYSTOLIC BLOOD PRESSURE: 125 MMHG | HEIGHT: 65 IN | TEMPERATURE: 97.5 F

## 2019-04-05 DIAGNOSIS — Z17.1 MALIGNANT NEOPLASM OF UPPER-INNER QUADRANT OF LEFT BREAST IN FEMALE, ESTROGEN RECEPTOR NEGATIVE (HCC): Primary | ICD-10-CM

## 2019-04-05 DIAGNOSIS — C50.212 MALIGNANT NEOPLASM OF UPPER-INNER QUADRANT OF LEFT BREAST IN FEMALE, ESTROGEN RECEPTOR NEGATIVE (HCC): Primary | ICD-10-CM

## 2019-04-05 LAB
BASO+EOS+MONOS # BLD AUTO: 0.1 K/UL (ref 0.2–1.2)
BASO+EOS+MONOS NFR BLD AUTO: 4 % (ref 3.2–16.9)
DIFFERENTIAL METHOD BLD: ABNORMAL
ERYTHROCYTE [DISTWIDTH] IN BLOOD BY AUTOMATED COUNT: 13 % (ref 11.8–15.8)
HCT VFR BLD AUTO: 36.9 % (ref 35–47)
HGB BLD-MCNC: 12.4 G/DL (ref 11.5–16)
LYMPHOCYTES # BLD: 0.3 K/UL (ref 0.8–3.5)
LYMPHOCYTES NFR BLD: 11 % (ref 12–49)
MCH RBC QN AUTO: 30.8 PG (ref 26–34)
MCHC RBC AUTO-ENTMCNC: 33.6 G/DL (ref 30–36.5)
MCV RBC AUTO: 91.6 FL (ref 80–99)
NEUTS SEG # BLD: 2.3 K/UL (ref 1.8–8)
NEUTS SEG NFR BLD: 86 % (ref 32–75)
PLATELET # BLD AUTO: 215 K/UL (ref 150–400)
RBC # BLD AUTO: 4.03 M/UL (ref 3.8–5.2)
WBC # BLD AUTO: 2.7 K/UL (ref 3.6–11)

## 2019-04-05 PROCEDURE — 36415 COLL VENOUS BLD VENIPUNCTURE: CPT

## 2019-04-05 PROCEDURE — 96413 CHEMO IV INFUSION 1 HR: CPT

## 2019-04-05 PROCEDURE — 77030012965 HC NDL HUBR BBMI -A

## 2019-04-05 PROCEDURE — 74011000250 HC RX REV CODE- 250: Performed by: INTERNAL MEDICINE

## 2019-04-05 PROCEDURE — 74011250636 HC RX REV CODE- 250/636: Performed by: INTERNAL MEDICINE

## 2019-04-05 PROCEDURE — 85025 COMPLETE CBC W/AUTO DIFF WBC: CPT

## 2019-04-05 PROCEDURE — 96375 TX/PRO/DX INJ NEW DRUG ADDON: CPT

## 2019-04-05 PROCEDURE — 74011250637 HC RX REV CODE- 250/637: Performed by: INTERNAL MEDICINE

## 2019-04-05 RX ORDER — SODIUM CHLORIDE 0.9 % (FLUSH) 0.9 %
10 SYRINGE (ML) INJECTION AS NEEDED
Status: ACTIVE | OUTPATIENT
Start: 2019-04-05 | End: 2019-04-05

## 2019-04-05 RX ORDER — HEPARIN 100 UNIT/ML
300-500 SYRINGE INTRAVENOUS AS NEEDED
Status: ACTIVE | OUTPATIENT
Start: 2019-04-05 | End: 2019-04-05

## 2019-04-05 RX ORDER — DEXAMETHASONE SODIUM PHOSPHATE 4 MG/ML
10 INJECTION, SOLUTION INTRA-ARTICULAR; INTRALESIONAL; INTRAMUSCULAR; INTRAVENOUS; SOFT TISSUE ONCE
Status: COMPLETED | OUTPATIENT
Start: 2019-04-05 | End: 2019-04-05

## 2019-04-05 RX ORDER — SODIUM CHLORIDE 9 MG/ML
25 INJECTION, SOLUTION INTRAVENOUS CONTINUOUS
Status: DISPENSED | OUTPATIENT
Start: 2019-04-05 | End: 2019-04-05

## 2019-04-05 RX ORDER — DIPHENHYDRAMINE HCL 25 MG
50 CAPSULE ORAL ONCE
Status: COMPLETED | OUTPATIENT
Start: 2019-04-05 | End: 2019-04-05

## 2019-04-05 RX ORDER — SODIUM CHLORIDE 9 MG/ML
10 INJECTION INTRAMUSCULAR; INTRAVENOUS; SUBCUTANEOUS AS NEEDED
Status: ACTIVE | OUTPATIENT
Start: 2019-04-05 | End: 2019-04-05

## 2019-04-05 RX ADMIN — DIPHENHYDRAMINE HYDROCHLORIDE 50 MG: 25 CAPSULE ORAL at 11:55

## 2019-04-05 RX ADMIN — FAMOTIDINE 20 MG: 10 INJECTION, SOLUTION INTRAVENOUS at 12:09

## 2019-04-05 RX ADMIN — SODIUM CHLORIDE 10 ML: 9 INJECTION, SOLUTION INTRAMUSCULAR; INTRAVENOUS; SUBCUTANEOUS at 11:56

## 2019-04-05 RX ADMIN — Medication 10 ML: at 11:56

## 2019-04-05 RX ADMIN — Medication 500 UNITS: at 13:49

## 2019-04-05 RX ADMIN — DEXAMETHASONE SODIUM PHOSPHATE 10 MG: 4 INJECTION, SOLUTION INTRA-ARTICULAR; INTRALESIONAL; INTRAMUSCULAR; INTRAVENOUS; SOFT TISSUE at 11:57

## 2019-04-05 RX ADMIN — PACLITAXEL 115 MG: 6 INJECTION, SOLUTION INTRAVENOUS at 12:39

## 2019-04-05 RX ADMIN — SODIUM CHLORIDE 25 ML/HR: 900 INJECTION, SOLUTION INTRAVENOUS at 11:56

## 2019-04-05 RX ADMIN — Medication 10 ML: at 13:49

## 2019-04-05 NOTE — PROGRESS NOTES
Pt arrived to [facility] ambulatory for Taxol C8D15 in no acute distress at 1130.  Assessment unremarkable except clearing throat, fatigue, numbness in feet, hot flashes, and darkened fingernails. R chest port accessed without issue and positive blood return noted.  Labs obtained- CBCap. Visit Vitals  /72 (BP 1 Location: Left arm, BP Patient Position: Sitting)   Pulse 99   Temp 97.5 °F (36.4 °C)   Resp 16   Ht 5' 5\" (1.651 m)   Wt 69.2 kg (152 lb 8 oz)   SpO2 100%   BMI 25.38 kg/m²     Recent Results (from the past 12 hour(s))   CBC WITH 3 PART DIFF    Collection Time: 04/05/19 11:41 AM   Result Value Ref Range    WBC 2.7 (L) 3.6 - 11.0 K/uL    RBC 4.03 3.80 - 5.20 M/uL    HGB 12.4 11.5 - 16.0 g/dL    HCT 36.9 35.0 - 47.0 %    MCV 91.6 80.0 - 99.0 FL    MCH 30.8 26.0 - 34.0 PG    MCHC 33.6 30.0 - 36.5 g/dL    RDW 13.0 11.8 - 15.8 %    PLATELET 241 238 - 477 K/uL    NEUTROPHILS 86 (H) 32 - 75 %    MIXED CELLS 4 3.2 - 16.9 %    LYMPHOCYTES 11 (L) 12 - 49 %    ABS. NEUTROPHILS 2.3 1.8 - 8.0 K/UL    ABS. MIXED CELLS 0.1 (L) 0.2 - 1.2 K/uL    ABS. LYMPHOCYTES 0.3 (L) 0.8 - 3.5 K/UL    DF AUTOMATED         The following medications administered:  NS @ KVO  Benadryl 50 mg PO  Decadron 10 mg IVP  Pepcid 20 mg IVP  Taxol 115 mg IV over 1 hour    Visit Vitals  /81 (BP 1 Location: Left arm, BP Patient Position: Sitting)   Pulse (!) 109   Temp 97.5 °F (36.4 °C)   Resp 16   Ht 5' 5\" (1.651 m)   Wt 69.2 kg (152 lb 8 oz)   SpO2 100%   BMI 25.38 kg/m²       Pt tolerated treatment well.  No adverse reaction noted. Port flushed per policy and needle removed, 2x2 and paper tape placed.  Pt discharged ambulatory in no acute distress at 1355, accompanied by mother. Treatments completed, no further appointments scheduled at this time.

## 2019-04-11 ENCOUNTER — HOSPITAL ENCOUNTER (OUTPATIENT)
Dept: MRI IMAGING | Age: 49
Discharge: HOME | End: 2019-04-11
Attending: SURGERY
Payer: MEDICAID

## 2019-04-11 DIAGNOSIS — C50.212 MALIGNANT NEOPLASM OF UPPER-INNER QUADRANT OF LEFT FEMALE BREAST, UNSPECIFIED ESTROGEN RECEPTOR STATUS (HCC): ICD-10-CM

## 2019-04-11 PROCEDURE — 77049 MRI BREAST C-+ W/CAD BI: CPT

## 2019-04-11 PROCEDURE — A9585 GADOBUTROL INJECTION: HCPCS | Performed by: SURGERY

## 2019-04-11 PROCEDURE — 74011250636 HC RX REV CODE- 250/636: Performed by: SURGERY

## 2019-04-11 RX ADMIN — GADOBUTROL 8 ML: 604.72 INJECTION INTRAVENOUS at 13:00

## 2019-04-16 ENCOUNTER — TELEPHONE (OUTPATIENT)
Dept: SURGERY | Age: 49
End: 2019-04-16

## 2019-04-16 NOTE — TELEPHONE ENCOUNTER
The patient had called and asked for her MRI results. Dr. Kim Leon made aware and states it was ok to call the patient. I left a message on her machine. The response she had to treatment was good and her surgery has already been discussed. The patient was instructed to call if she has any further questions.

## 2019-04-24 ENCOUNTER — TELEPHONE (OUTPATIENT)
Dept: SURGERY | Age: 49
End: 2019-04-24

## 2019-04-24 NOTE — TELEPHONE ENCOUNTER
Appt made for tomorrow 4/25/19 at [id number] to see Yo Hartman NP, at 3 pm. Patient is appreciative.

## 2019-04-24 NOTE — TELEPHONE ENCOUNTER
----- Message from Rae Putnam MD sent at 4/24/2019  1:34 PM EDT -----  Regarding: RE: swelling LEFT breast and LEFT arm  Me or aniket  Maybe she can come see aniket tomorrow see if she needs duplex  ----- Message -----  From: Derrick Pérez RN  Sent: 4/24/2019   1:21 PM  To: Rae Putnam MD  Subject: swelling LEFT breast and LEFT arm                Maryann,  Patient left a message saying that she just started to notice swelling of LEFT breast. Also experiencing swelling in LEFT arm. Scheduled for LEFT breast lumpectomy SLNB 5/16/19. Do you need to see her?   Thanks,  Anne Campos

## 2019-04-25 ENCOUNTER — OFFICE VISIT (OUTPATIENT)
Dept: SURGERY | Age: 49
End: 2019-04-25

## 2019-04-25 VITALS
DIASTOLIC BLOOD PRESSURE: 74 MMHG | HEIGHT: 61 IN | WEIGHT: 152 LBS | BODY MASS INDEX: 28.7 KG/M2 | SYSTOLIC BLOOD PRESSURE: 110 MMHG | HEART RATE: 87 BPM

## 2019-04-25 DIAGNOSIS — I80.8 MONDOR'S DISEASE: ICD-10-CM

## 2019-04-25 DIAGNOSIS — C50.212 MALIGNANT NEOPLASM OF UPPER-INNER QUADRANT OF LEFT BREAST IN FEMALE, ESTROGEN RECEPTOR NEGATIVE (HCC): Primary | ICD-10-CM

## 2019-04-25 DIAGNOSIS — Z17.1 MALIGNANT NEOPLASM OF UPPER-INNER QUADRANT OF LEFT BREAST IN FEMALE, ESTROGEN RECEPTOR NEGATIVE (HCC): Primary | ICD-10-CM

## 2019-04-25 NOTE — PATIENT INSTRUCTIONS
Lumpectomy: Before Your Surgery  What is a lumpectomy? A lumpectomy is surgery to remove cancer from the breast. Your doctor will make a small cut (incision) and take out the cancer. The whole breast will not be removed. The doctor will try to also take a small amount of normal tissue around the cancer. This is known as \"getting clear margins. \" Some people will need another surgery to be sure the margins are clear. The doctor may also check the nearby lymph nodes during the surgery. After a lumpectomy, you will probably go home the same day. Most people can go back to work or their normal routine in 1 to 3 weeks. This depends on how you feel. It also depends on the type of work you do and whether you need more treatment. This may include radiation or chemotherapy. Most people who have a lumpectomy for cancer also get radiation treatment. The surgery will leave scars. Sometimes it leaves a dent in the breast too. Most women will look normal in a bra. But your breasts may not match in size or shape after surgery. This depends on the size of your breasts. It also depends on how much tissue was removed. When you find out that you have cancer, you may feel many emotions and may need some help coping. Seek out family, friends, and counselors for support. You also can do things at home to make yourself feel better while you go through treatment. Call the [facility] (9-608.373.2627) or visit its website at [address]it for more information. Follow-up care is a key part of your treatment and safety. Be sure to make and go to all appointments, and call your doctor if you are having problems. It's also a good idea to know your test results and keep a list of the medicines you take. What happens before surgery?   Surgery can be stressful. This information will help you understand what you can expect.  And it will help you safely prepare for surgery.   Preparing for surgery    · Understand exactly what surgery is planned, along with the risks, benefits, and other options. · Tell your doctors ALL the medicines, vitamins, supplements, and herbal remedies you take. Some of these can increase the risk of bleeding or interact with anesthesia.     · If you take blood thinners, such as warfarin (Coumadin), clopidogrel (Plavix), or aspirin, be sure to talk to your doctor. He or she will tell you if you should stop taking these medicines before your surgery. Make sure that you understand exactly what your doctor wants you to do.     · Your doctor will tell you which medicines to take or stop before your surgery. You may need to stop taking certain medicines a week or more before surgery. So talk to your doctor as soon as you can.     · If you have an advance directive, let your doctor know. It may include a living will and a durable power of  for health care. Bring a copy to the hospital. If you don't have one, you may want to prepare one. It lets your doctor and loved ones know your health care wishes. Doctors advise that everyone prepare these papers before any type of surgery or procedure. What happens on the day of surgery? · Follow the instructions exactly about when to stop eating and drinking. If you don't, your surgery may be canceled. If your doctor told you to take your medicines on the day of surgery, take them with only a sip of water.     · Take a bath or shower before you come in for your surgery. Do not apply lotions, perfumes, deodorants, or nail polish.     · Do not shave the surgical site yourself.     · Take off all jewelry and piercings. And take out contact lenses, if you wear them.     · Bring a comfortable, supportive bra with you. You will need to wear this all the time, even during the night, for the first week after surgery.    At the hospital or surgery center   · Bring a picture ID.     · The area for surgery is often marked to make sure there are no errors.  If your doctor can't feel the lump, a needle can be put in the suspicious area. This may be done during a mammogram just before surgery. The needle will guide your doctor.     · You will be kept comfortable and safe by your anesthesia provider. The anesthesia may make you sleep. Or it may just numb the area being worked on.     · The surgery will take about 1 hour or longer, depending on the size of the lump. Going home   · Be sure you have someone to drive you home. Anesthesia and pain medicine make it unsafe for you to drive.     · You will be given more specific instructions about recovering from your surgery. They will cover things like diet, wound care, follow-up care, driving, and getting back to your normal routine. When should you call your doctor? · You have questions or concerns.     · You don't understand how to prepare for your surgery.     · You become ill before the surgery (such as fever, flu, or a cold).     · You need to reschedule or have changed your mind about having the surgery. Where can you learn more? Go to http://mark-monique.info/. Enter (31) 529-033 in the search box to learn more about \"Lumpectomy: Before Your Surgery. \"  Current as of: March 27, 2018  Content Version: 11.9  © 2121-0907 [employer]. Care instructions adapted under license by [employer] (which disclaims liability or warranty for this information). If you have questions about a medical condition or this instruction, always ask your healthcare professional. [place] [address] any warranty or liability for your use of this information.

## 2019-04-25 NOTE — PROGRESS NOTES
HISTORY OF PRESENT ILLNESS  Chioma Adler is a 54 y.o. female. HPI ESTABLISHED patient here for swelling of LEFT lower breast x 2 weeks. Pain upon palpation. At first thought she pulled a muscle, but pain and swelling haven't resolved. Describes a \"vein-like\" feeling to her LEFT breast.   Completed neoadjuvant chemotherapy and is scheduled for LEFT breast lumpectomy and LEFT SLNB on 5/16/19     Breast cancer-  Stage 3 infiltrating ductal carcinoma, Tumor size 0.8 mm, LN -v3, grade 3, ER 0, MO 15%, Her 2 -ve. High risk mammaprint. + supraclav node  Enlarged supraclavicular LN on the same side which biopsy showed invasive breast cancer. Pet avid supraclav and IM nodes on left  11/8/18- port-a-cath insertion  11/14/2018 - neoadjuvant chemotherapy started - Dr. Romana Bosch  5/16/19 - pending surgery - LEFT breast lumpectomy and LEFT SLNB - Dr. Marveen Homans    OB History    None      Obstetric Comments   Menarche:  17. LMP: 9/26/18. # of Children:  ?. Age at Delivery of First Child:  26.   Hysterectomy/oophorectomy:  NO/NO. Breast Bx:  yes. Hx of Breast Feeding:  no. BCP:  ? . Hormone therapy:  ? Leeanna Angela Past Surgical History:   Procedure Laterality Date    HX DILATION AND CURETTAGE      2010     Breast imaging-  LEFT breast MRI 10/2018  Bilateral breast MRI 9/2018  LEFT breast diagnostic mammogram 9/2018  Screening mammogram 8/2018    ROS    Physical Exam   Constitutional: She appears well-developed and well-nourished. Pulmonary/Chest: Left breast exhibits tenderness. Left breast exhibits no inverted nipple, no mass, no nipple discharge and no skin change. Musculoskeletal: Normal range of motion. UE x 2   Skin: Skin is warm, dry and intact. Chest and breasts examined   Psychiatric: She has a normal mood and affect.  Her speech is normal and behavior is normal.     Visit Vitals  /74   Pulse 87   Ht 5' 1\" (1.549 m)   Wt 152 lb (68.9 kg)   BMI 28.72 kg/m²     ASSESSMENT and PLAN  Encounter Diagnoses   Name Primary?  Malignant neoplasm of upper-inner quadrant of left breast in female, estrogen receptor negative (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 Explained benign pathology and sx management with warm compresses and NSAIDs. Will call back if has not resolved by 5/10/2019 to see if anything needs to be done before surgery. Breast MRI post neoadjuvant on 4/11/19 - no residual enhancement seen. Surgery scheduled for 5/16/2019 with Dr. Sid Dominguez. Follow-up PRN before then. She is comfortable with this plan. All questions answered and she stated understanding.

## 2019-05-03 DIAGNOSIS — C50.212 MALIGNANT NEOPLASM OF UPPER-INNER QUADRANT OF LEFT FEMALE BREAST, UNSPECIFIED ESTROGEN RECEPTOR STATUS (HCC): Primary | ICD-10-CM

## 2019-05-08 ENCOUNTER — DOCUMENTATION ONLY (OUTPATIENT)
Dept: SURGERY | Age: 49
End: 2019-05-08

## 2019-05-09 ENCOUNTER — TELEPHONE (OUTPATIENT)
Dept: SURGERY | Age: 49
End: 2019-05-09

## 2019-05-09 NOTE — PERIOP NOTES
[facility] Ambulatory Surgery Unit Pre-operative Instructions Surgery/Procedure Date  Thursday, May 16, 2019            Tentative Arrival Time 0900 1. On the day of your surgery/procedure, please report to the Ambulatory Surgery Unit Registration Desk and sign in at your designated time. The Ambulatory Surgery Unit is located in [facility] on the [facility] side of the [facility] across from the [address] [place] [address]. Please have all of your health insurance cards and a photo ID. 2. You must have someone with you to drive you home, as you should not drive a car for 24 hours following anesthesia. Please make arrangements for a responsible adult friend or family member to stay with you for at least the first 24 hours after your surgery. 3. Do not have anything to eat or drink (including water, gum, mints, coffee, juice) after 11:59 PM, Wednesday. This may not apply to medications prescribed by your physician. (Please note below the special instructions with medications to take the morning of surgery, if applicable.) 4. We recommend you do not drink any alcoholic beverages for 24 hours before and after your surgery. 5. Contact your surgeons office for instructions on the following medications: non-steroidal anti-inflammatory drugs (i.e. Advil, Aleve), vitamins, and supplements. (Some surgeons will want you to stop these medications prior to surgery and others may allow you to take them) **If you are currently taking Plavix, Coumadin, Aspirin and/or other blood-thinning agents, contact your surgeon for instructions. ** Your surgeon will partner with the physician prescribing these medications to determine if it is safe to stop or if you need to continue taking. Please do not stop taking these medications without instructions from your surgeon.  
 
6. In an effort to help prevent surgical site infection, we ask that you shower with an anti-bacterial soap (i.e. Dial/Safeguard, or the soap provided to you at your preadmission testing appointment) for 3 days prior to and on the morning of surgery, using a fresh towel after each shower. (Please begin this process with fresh bed linens.) Do not apply any lotions, powders, or deodorants after the shower on the day of your procedure. If applicable, please do not shave the operative site for 48 hours prior to surgery. 7. Wear comfortable clothes. Wear glasses instead of contacts. Do not bring any jewelry or money (other than copays or fees as instructed). Do not wear make-up, particularly mascara, the morning of your surgery. Do not wear nail polish, particularly if you are having foot /hand surgery. Wear your hair loose or down, no ponytails, buns, deanna pins or clips. All body piercings must be removed. 8. You should understand that if you do not follow these instructions your surgery may be cancelled. If your physical condition changes (i.e. fever, cold or flu) please contact your surgeon as soon as possible. 9. It is important that you be on time. If a situation occurs where you may be late, or if you have any questions or problems, please call [phone]. 
 
10. Your surgery time may be subject to change. You will receive a phone call the day prior to surgery to confirm your arrival time. 11. Pediatric patients: please bring a change of clothes, diapers, bottle/sippy cup, pacifier, etc. 
 
 
Special Instructions: Take all medications and inhalers, as prescribed, on the morning of surgery with a sip of water. I understand a pre-operative phone call will be made to verify my surgery time. In the event that I am not available, I give permission for a message to be left on my answering service and/or with another person? yes Preop instructions reviewed  Pt verbalized understanding.  
 
 ___________________      ___________________      ________________ (Signature of Patient)          (Witness)                   (Date and Time)

## 2019-05-09 NOTE — TELEPHONE ENCOUNTER
Patient called and wanted to know if she can get her port out on the day of her lumpectomy surgery on 5/16/19. She stated she was done with chemotherapy. I let patient know that I would send Dr. Jc Pop a note asking her this question and will call her back tomorrow. She was appreciative of return phone call.

## 2019-05-10 NOTE — TELEPHONE ENCOUNTER
Dr. Niharika Phillips wants to see the final pathology from surgery before the port can be removed. I called patient back to let her know this. She was appreciative of return phone call.

## 2019-05-13 NOTE — PERIOP NOTES
Pt stated that she thought port was being removed along with this procedure and she would call office. I called and left message for Paolo Staley as a follow up.

## 2019-05-15 ENCOUNTER — ANESTHESIA EVENT (OUTPATIENT)
Dept: SURGERY | Age: 49
End: 2019-05-15
Payer: COMMERCIAL

## 2019-05-16 ENCOUNTER — ANESTHESIA (OUTPATIENT)
Dept: SURGERY | Age: 49
End: 2019-05-16
Payer: COMMERCIAL

## 2019-05-16 ENCOUNTER — HOSPITAL ENCOUNTER (OUTPATIENT)
Age: 49
Setting detail: OUTPATIENT SURGERY
Discharge: HOME | End: 2019-05-16
Attending: SURGERY | Admitting: SURGERY
Payer: COMMERCIAL

## 2019-05-16 ENCOUNTER — APPOINTMENT (OUTPATIENT)
Dept: NUCLEAR MEDICINE | Age: 49
End: 2019-05-16
Attending: SURGERY
Payer: COMMERCIAL

## 2019-05-16 VITALS
WEIGHT: 156 LBS | SYSTOLIC BLOOD PRESSURE: 122 MMHG | HEIGHT: 65 IN | DIASTOLIC BLOOD PRESSURE: 83 MMHG | RESPIRATION RATE: 15 BRPM | BODY MASS INDEX: 25.99 KG/M2 | HEART RATE: 85 BPM | OXYGEN SATURATION: 100 % | TEMPERATURE: 97.8 F

## 2019-05-16 DIAGNOSIS — C50.212 MALIGNANT NEOPLASM OF UPPER-INNER QUADRANT OF LEFT FEMALE BREAST, UNSPECIFIED ESTROGEN RECEPTOR STATUS (HCC): ICD-10-CM

## 2019-05-16 DIAGNOSIS — C50.212 MALIGNANT NEOPLASM OF UPPER-INNER QUADRANT OF LEFT FEMALE BREAST (HCC): ICD-10-CM

## 2019-05-16 LAB — HCG UR QL: NEGATIVE

## 2019-05-16 PROCEDURE — 88307 TISSUE EXAM BY PATHOLOGIST: CPT

## 2019-05-16 PROCEDURE — 77030018836 HC SOL IRR NACL ICUM -A: Performed by: SURGERY

## 2019-05-16 PROCEDURE — 74011250636 HC RX REV CODE- 250/636

## 2019-05-16 PROCEDURE — 88331 PATH CONSLTJ SURG 1 BLK 1SPC: CPT

## 2019-05-16 PROCEDURE — 77030010509 HC AIRWY LMA MSK TELE -A: Performed by: ANESTHESIOLOGY

## 2019-05-16 PROCEDURE — 77030002933 HC SUT MCRYL J&J -A: Performed by: SURGERY

## 2019-05-16 PROCEDURE — 74011000250 HC RX REV CODE- 250: Performed by: SURGERY

## 2019-05-16 PROCEDURE — 74011250636 HC RX REV CODE- 250/636: Performed by: SURGERY

## 2019-05-16 PROCEDURE — 76030000001 HC AMB SURG OR TIME 1 TO 1.5: Performed by: SURGERY

## 2019-05-16 PROCEDURE — 77030021352 HC CBL LD SYS DISP COVD -B: Performed by: SURGERY

## 2019-05-16 PROCEDURE — 88304 TISSUE EXAM BY PATHOLOGIST: CPT

## 2019-05-16 PROCEDURE — 81025 URINE PREGNANCY TEST: CPT

## 2019-05-16 PROCEDURE — 74011250636 HC RX REV CODE- 250/636: Performed by: ANESTHESIOLOGY

## 2019-05-16 PROCEDURE — 77030034626 HC LIGASURE SM JAW SEAL OPN SURG COVD -E: Performed by: SURGERY

## 2019-05-16 PROCEDURE — 74011250637 HC RX REV CODE- 250/637

## 2019-05-16 PROCEDURE — 77030002996 HC SUT SLK J&J -A: Performed by: SURGERY

## 2019-05-16 PROCEDURE — 76210000034 HC AMBSU PH I REC 0.5 TO 1 HR: Performed by: SURGERY

## 2019-05-16 PROCEDURE — 77030011640 HC PAD GRND REM COVD -A: Performed by: SURGERY

## 2019-05-16 PROCEDURE — 77030011825 HC SUPP SURG PSTOP S2SG -B: Performed by: SURGERY

## 2019-05-16 PROCEDURE — 77030011267 HC ELECTRD BLD COVD -A: Performed by: SURGERY

## 2019-05-16 PROCEDURE — 77030031139 HC SUT VCRL2 J&J -A: Performed by: SURGERY

## 2019-05-16 PROCEDURE — 77030018673: Performed by: SURGERY

## 2019-05-16 PROCEDURE — 76210000046 HC AMBSU PH II REC FIRST 0.5 HR: Performed by: SURGERY

## 2019-05-16 PROCEDURE — 88342 IMHCHEM/IMCYTCHM 1ST ANTB: CPT

## 2019-05-16 PROCEDURE — 76060000062 HC AMB SURG ANES 1 TO 1.5 HR: Performed by: SURGERY

## 2019-05-16 PROCEDURE — 77030034556 HC PRB MARGN DETECT LUMPECTMY DISP DUNE -G: Performed by: SURGERY

## 2019-05-16 PROCEDURE — A9520 TC99 TILMANOCEPT DIAG 0.5MCI: HCPCS

## 2019-05-16 RX ORDER — MIDAZOLAM HYDROCHLORIDE 1 MG/ML
INJECTION, SOLUTION INTRAMUSCULAR; INTRAVENOUS AS NEEDED
Status: DISCONTINUED | OUTPATIENT
Start: 2019-05-16 | End: 2019-05-16 | Stop reason: HOSPADM

## 2019-05-16 RX ORDER — DEXAMETHASONE SODIUM PHOSPHATE 100 MG/10ML
INJECTION INTRAMUSCULAR; INTRAVENOUS AS NEEDED
Status: DISCONTINUED | OUTPATIENT
Start: 2019-05-16 | End: 2019-05-16 | Stop reason: HOSPADM

## 2019-05-16 RX ORDER — HYDROMORPHONE HYDROCHLORIDE 1 MG/ML
.2-.5 INJECTION, SOLUTION INTRAMUSCULAR; INTRAVENOUS; SUBCUTANEOUS ONCE
Status: DISCONTINUED | OUTPATIENT
Start: 2019-05-16 | End: 2019-05-16 | Stop reason: HOSPADM

## 2019-05-16 RX ORDER — FENTANYL CITRATE 50 UG/ML
INJECTION, SOLUTION INTRAMUSCULAR; INTRAVENOUS AS NEEDED
Status: DISCONTINUED | OUTPATIENT
Start: 2019-05-16 | End: 2019-05-16 | Stop reason: HOSPADM

## 2019-05-16 RX ORDER — SODIUM CHLORIDE, SODIUM LACTATE, POTASSIUM CHLORIDE, CALCIUM CHLORIDE 600; 310; 30; 20 MG/100ML; MG/100ML; MG/100ML; MG/100ML
25 INJECTION, SOLUTION INTRAVENOUS CONTINUOUS
Status: DISCONTINUED | OUTPATIENT
Start: 2019-05-16 | End: 2019-05-16 | Stop reason: HOSPADM

## 2019-05-16 RX ORDER — CEFAZOLIN SODIUM/WATER 2 G/20 ML
2 SYRINGE (ML) INTRAVENOUS ONCE
Status: COMPLETED | OUTPATIENT
Start: 2019-05-16 | End: 2019-05-16

## 2019-05-16 RX ORDER — SCOLOPAMINE TRANSDERMAL SYSTEM 1 MG/1
PATCH, EXTENDED RELEASE TRANSDERMAL
Status: DISCONTINUED
Start: 2019-05-16 | End: 2019-05-16 | Stop reason: HOSPADM

## 2019-05-16 RX ORDER — SODIUM CHLORIDE 0.9 % (FLUSH) 0.9 %
5-40 SYRINGE (ML) INJECTION AS NEEDED
Status: DISCONTINUED | OUTPATIENT
Start: 2019-05-16 | End: 2019-05-16 | Stop reason: HOSPADM

## 2019-05-16 RX ORDER — LIDOCAINE HYDROCHLORIDE 10 MG/ML
0.1 INJECTION, SOLUTION EPIDURAL; INFILTRATION; INTRACAUDAL; PERINEURAL AS NEEDED
Status: DISCONTINUED | OUTPATIENT
Start: 2019-05-16 | End: 2019-05-16 | Stop reason: HOSPADM

## 2019-05-16 RX ORDER — OXYCODONE AND ACETAMINOPHEN 5; 325 MG/1; MG/1
1 TABLET ORAL
Qty: 30 TAB | Refills: 0 | Status: SHIPPED | OUTPATIENT
Start: 2019-05-16 | End: 2019-05-19

## 2019-05-16 RX ORDER — DIPHENHYDRAMINE HYDROCHLORIDE 50 MG/ML
12.5 INJECTION, SOLUTION INTRAMUSCULAR; INTRAVENOUS AS NEEDED
Status: DISCONTINUED | OUTPATIENT
Start: 2019-05-16 | End: 2019-05-16 | Stop reason: HOSPADM

## 2019-05-16 RX ORDER — SCOLOPAMINE TRANSDERMAL SYSTEM 1 MG/1
1 PATCH, EXTENDED RELEASE TRANSDERMAL ONCE
Status: DISCONTINUED | OUTPATIENT
Start: 2019-05-16 | End: 2019-05-16 | Stop reason: HOSPADM

## 2019-05-16 RX ORDER — ONDANSETRON 4 MG/1
4 TABLET, ORALLY DISINTEGRATING ORAL
Qty: 5 TAB | Refills: 1 | Status: SHIPPED | OUTPATIENT
Start: 2019-05-16 | End: 2019-05-29 | Stop reason: ALTCHOICE

## 2019-05-16 RX ORDER — LIDOCAINE HYDROCHLORIDE 20 MG/ML
INJECTION, SOLUTION EPIDURAL; INFILTRATION; INTRACAUDAL; PERINEURAL AS NEEDED
Status: DISCONTINUED | OUTPATIENT
Start: 2019-05-16 | End: 2019-05-16 | Stop reason: HOSPADM

## 2019-05-16 RX ORDER — ONDANSETRON 2 MG/ML
INJECTION INTRAMUSCULAR; INTRAVENOUS AS NEEDED
Status: DISCONTINUED | OUTPATIENT
Start: 2019-05-16 | End: 2019-05-16 | Stop reason: HOSPADM

## 2019-05-16 RX ORDER — PROPOFOL 10 MG/ML
INJECTION, EMULSION INTRAVENOUS AS NEEDED
Status: DISCONTINUED | OUTPATIENT
Start: 2019-05-16 | End: 2019-05-16 | Stop reason: HOSPADM

## 2019-05-16 RX ORDER — SODIUM CHLORIDE 0.9 % (FLUSH) 0.9 %
5-40 SYRINGE (ML) INJECTION EVERY 8 HOURS
Status: DISCONTINUED | OUTPATIENT
Start: 2019-05-16 | End: 2019-05-16 | Stop reason: HOSPADM

## 2019-05-16 RX ORDER — MORPHINE SULFATE 10 MG/ML
2 INJECTION, SOLUTION INTRAMUSCULAR; INTRAVENOUS
Status: DISCONTINUED | OUTPATIENT
Start: 2019-05-16 | End: 2019-05-16 | Stop reason: HOSPADM

## 2019-05-16 RX ORDER — OXYCODONE AND ACETAMINOPHEN 5; 325 MG/1; MG/1
1 TABLET ORAL
Status: DISCONTINUED | OUTPATIENT
Start: 2019-05-16 | End: 2019-05-16 | Stop reason: HOSPADM

## 2019-05-16 RX ORDER — FENTANYL CITRATE 50 UG/ML
25 INJECTION, SOLUTION INTRAMUSCULAR; INTRAVENOUS
Status: DISCONTINUED | OUTPATIENT
Start: 2019-05-16 | End: 2019-05-16 | Stop reason: HOSPADM

## 2019-05-16 RX ADMIN — FENTANYL CITRATE 25 MCG: 50 INJECTION, SOLUTION INTRAMUSCULAR; INTRAVENOUS at 13:37

## 2019-05-16 RX ADMIN — SODIUM CHLORIDE, SODIUM LACTATE, POTASSIUM CHLORIDE, AND CALCIUM CHLORIDE 25 ML/HR: 600; 310; 30; 20 INJECTION, SOLUTION INTRAVENOUS at 14:10

## 2019-05-16 RX ADMIN — FENTANYL CITRATE 25 MCG: 50 INJECTION, SOLUTION INTRAMUSCULAR; INTRAVENOUS at 12:46

## 2019-05-16 RX ADMIN — MIDAZOLAM HYDROCHLORIDE 2 MG: 1 INJECTION, SOLUTION INTRAMUSCULAR; INTRAVENOUS at 12:35

## 2019-05-16 RX ADMIN — MEPERIDINE HYDROCHLORIDE 12.5 MG: 25 INJECTION, SOLUTION INTRAMUSCULAR; INTRAVENOUS; SUBCUTANEOUS at 13:55

## 2019-05-16 RX ADMIN — SODIUM CHLORIDE, SODIUM LACTATE, POTASSIUM CHLORIDE, AND CALCIUM CHLORIDE 25 ML/HR: 600; 310; 30; 20 INJECTION, SOLUTION INTRAVENOUS at 11:01

## 2019-05-16 RX ADMIN — LIDOCAINE HYDROCHLORIDE 40 MG: 20 INJECTION, SOLUTION EPIDURAL; INFILTRATION; INTRACAUDAL; PERINEURAL at 12:40

## 2019-05-16 RX ADMIN — FENTANYL CITRATE 25 MCG: 50 INJECTION, SOLUTION INTRAMUSCULAR; INTRAVENOUS at 12:52

## 2019-05-16 RX ADMIN — PROPOFOL 200 MG: 10 INJECTION, EMULSION INTRAVENOUS at 12:40

## 2019-05-16 RX ADMIN — Medication 2 G: at 12:47

## 2019-05-16 RX ADMIN — DEXAMETHASONE SODIUM PHOSPHATE 4 MG: 100 INJECTION INTRAMUSCULAR; INTRAVENOUS at 12:51

## 2019-05-16 RX ADMIN — FENTANYL CITRATE 25 MCG: 50 INJECTION, SOLUTION INTRAMUSCULAR; INTRAVENOUS at 13:18

## 2019-05-16 RX ADMIN — ONDANSETRON 4 MG: 2 INJECTION INTRAMUSCULAR; INTRAVENOUS at 13:19

## 2019-05-16 NOTE — PERIOP NOTES
[facility] 1970 
[id number] Situation: 
Verbal report given from: Briana Patel CRNA Procedure: Procedure(s): LEFT BREAST LUMPECTOMY/LEFT SENTINEL NODE BIOPSY SENTINEL NODE BIOPSY Background: 
 
Preoperative diagnosis: LEFT BREAST CANCER Postoperative diagnosis: LEFT BREAST CANCER :  Dr. Rakesh Estevez Assistant(s): Circ-1: Kristine Guerrero RN Scrub Tech-1: Mc Kyle, [address] [place] [address] Surg Asst-1: [facility] Specimens:  
ID Type Source Tests Collected by Time Destination 1 : Left Axillary [place] Node # 1 Frozen Section Luis Hui MD 5/16/2019 1253 Pathology 2 : Left Axillary [place] Node # 2 Frozen Section Luis Hui MD 5/16/2019 1253 Pathology 3 : Left Axillary [place] Node # 3 Frozen Section Luis Hui MD 5/16/2019 1305 Pathology 4 : Left Breast Lumpectomy (Short Superior/Long Lateral) Preservative Breast  Carol Hui MD 5/16/2019 1314 Pathology Assessment: 
Intra-procedure medications Anesthesia gave intra-procedure sedation and medications, see anesthesia flow sheet Intravenous fluids: Daphane Buckle Vital signs stable Recommendation: 
 
Permission to share finding with friend Reji : yes

## 2019-05-16 NOTE — BRIEF OP NOTE
BRIEF OPERATIVE NOTE Date of Procedure: 5/16/2019 Preoperative Diagnosis: LEFT BREAST CANCER UPPER INNER QUADRANT Postoperative Diagnosis: LEFT BREAST CANCER   
UPPER INNER QUADRANT Procedure(s): LEFT BREAST LUMPECTOMY/LEFT SENTINEL NODE BIOPSY SENTINEL NODE BIOPSY INTRAOP MARGIN ASSESSMENT USING RF SPECTROSCOPY Surgeon(s) and Role: Tam Laureano MD - Primary Surgical Assistant: Desmond Ribeiro Surgical Staff: 
Circ-1: Bryan Horn RN Scrub Tech-1: Lori Johnson, [address] Surg Asst-1: Masha Seo Event Time In Time Out Incision Start [phone] Incision Close 1335 Anesthesia: General  
Estimated Blood Loss: MINIMAL Specimens:  
ID Type Source Tests Collected by Time Destination 1 : Left Axillary [place] Node # 1 Frozen Section Luis Maharaj MD 5/16/2019 1253 Pathology 2 : Left Axillary [place] Node # 2 Frozen Section Luis Maharaj MD 5/16/2019 1253 Pathology 3 : Left Axillary [place] Node # 3 Frozen Section Luis Maharaj MD 5/16/2019 1305 Pathology 4 : Left Breast Lumpectomy (Short Superior/Long Lateral) Preservative Breast  Alexei Maharaj MD 5/16/2019 1314 Pathology Findings: SLN NEGATIVE Complications: NONE Implants: * No implants in log * DICTATED STAT

## 2019-05-16 NOTE — H&P
HISTORY OF PRESENT ILLNESS Tato Olivo is a 54 y.o. female. HPI ESTABLISHED patient here for follow up during neoadjuvant chemotherapy for LEFT breast cancer. Not able to feel enlarged lymph node. She has 3 more treatments of Taxol left and due to complete chemo the first week of April. 
  
Left breast carcinoma: 
Stage 3 infiltrating ductal carcinoma, Tumor size 0.8 mm, LN -v3, grade 3, ER 0, LA 15%, Her 2 -ve. High risk mammaprint. + supraclav node Enlarged supraclavicular LN on the same side which biopsy showed invasive breast cancer. Pet avid supraclav and IM nodes on left 11/8/18- port-a-cath insertion 11/14/2018 - neoadjuvant chemotherapy started - Dr. London Morgan 
  
Breast imaging- 
LEFT breast MRI 10/2018 Bilateral breast MRI 9/2018 LEFT breast diagnostic mammogram 9/2018 Screening mammogram 8/2018 
  
Review of Systems All other systems reviewed and are negative. 
  
  
Physical Exam  
Pulmonary/Chest:  
No palpable mass left breast 
No palpable adenopathy Nursing note and vitals reviewed. 
  
  
ASSESSMENT and PLAN 
    ICD-10-CM ICD-9-CM    
1. Malignant neoplasm of upper-inner quadrant of left female breast, unspecified estrogen receptor status ([facility] [address] C50.212 174.2 MRI BREAST BI W WO CONT  
  
- 47 yo female with Stage 3 left breast IDC Surgical plan is left us guided lumpectomy, sln biopsy

## 2019-05-16 NOTE — ANESTHESIA POSTPROCEDURE EVALUATION
Procedure(s): LEFT BREAST LUMPECTOMY/LEFT SENTINEL NODE BIOPSY SENTINEL NODE BIOPSY. general 
 
Anesthesia Post Evaluation Multimodal analgesia: multimodal analgesia used between 6 hours prior to anesthesia start to PACU discharge Patient location during evaluation: bedside Patient participation: complete - patient participated Level of consciousness: awake and alert Pain score: 0 Airway patency: patent Anesthetic complications: no 
Cardiovascular status: acceptable Respiratory status: acceptable Hydration status: acceptable Post anesthesia nausea and vomiting:  controlled Vitals Value Taken Time /75 5/16/2019  2:30 PM  
Temp 36.6 °C (97.8 °F) 5/16/2019  2:00 PM  
Pulse 75 5/16/2019  2:30 PM  
Resp 13 5/16/2019  2:30 PM  
SpO2 99 % 5/16/2019  2:30 PM

## 2019-05-16 NOTE — PERIOP NOTES
Permission received to review discharge instructions and discuss private health information with Theron Mendez, son and Marietta Francis, friend.

## 2019-05-16 NOTE — DISCHARGE INSTRUCTIONS
Discharge Instructions from Dr. Lr How    · I will call you with the pathology results, typically within 1 week from today. · You may shower, but no hot tubs, swimming pools, or baths until your incision is healed. · No heavy lifting with the affected extremity (nothing greater than 5 pounds), and limit its use for the next 4-5 days. · You may use an ice pack for comfort for the next couple of days, but do not place ice directly on the skin. Rather, use a towel or clothing to serve as a barrier between skin and ice to prevent injury. · If I placed a drain, follow the drain instructions provided, especially as you keep a record of the drain output. · Follow medication instructions carefully. No aspirin, ibuprofen or aleve x 1 week  · May take tylenol instead of narcotic. · Wear surgical bra and dressing for 24 hours, then remove. Wear supportive bra at all times. · You will have bruising and swelling  · Watch for signs of infection as listed below. · Redness  · Swelling  · Drainage from the incision or from your nipple that appears infected  · Fever over 101.5 degrees for consecutive readings, or over 99.5 if you are currently undergoing chemotherapy. · Call our office (number is below) for a follow-up appointment. · If you have any problems, our phone number is [phone]    DO NOT TAKE TYLENOL/ACETAMINOPHEN WITH PERCOCET, [address], [address] TAKE NARCOTIC PAIN MEDICATIONS WITH FOOD     Narcotics tend to be constipating, we suggest taking a stool softener such as Colace or Miralax (follow package instructions). DO NOT DRIVE WHILE TAKING NARCOTIC PAIN MEDICATIONS. DO NOT TAKE SLEEPING MEDICATIONS OR ANTIANXIETY MEDICATIONS WHILE TAKING NARCOTIC PAIN MEDICATIONS,  ESPECIALLY THE NIGHT OF ANESTHESIA! CPAP PATIENTS BE SURE TO WEAR MACHINE WHENEVER NAPPING OR SLEEPING!     DISCHARGE SUMMARY from Nurse    The following personal items collected during your admission are returned to you:   Dental Appliance: Dental Appliances: None  Vision: Visual Aid: Glasses(son)  Hearing Aid:    Jewelry: Jewelry: None  Clothing: Clothing: With patient  Other Valuables: Other Valuables: Purse(son)  Valuables sent to safe:        PATIENT INSTRUCTIONS:    After General Anesthesia or Intravenous Sedation, for 24 hours or while taking prescription Narcotics:        Someone should be with you for the next 24 hours. For your own safety, a responsible adult must drive you home. · Limit your activities  · Recommended activity: Rest today, up with assistance today. Do not climb stairs or shower unattended for the next 24 hours. · Please start with a soft bland diet and advance as tolerated (no nausea) to regular diet. · If you have a sore throat you should try the following: fluids, warm salt water gargles, or throat lozenges. If it does not improve after several days please follow up with your primary physician. · Do not drive and operate hazardous machinery  · Do not make important personal or business decisions  · Do  not drink alcoholic beverages  · If you have not urinated within 8 hours after discharge, please contact your surgeon on call. Report the following to your surgeon:  · Excessive pain, swelling, redness or odor of or around the surgical area  · Temperature over 100.5  · Nausea and vomiting lasting longer than 4 hours or if unable to take medications  · Any signs of decreased circulation or nerve impairment to extremity: change in color, persistent  numbness, tingling, coldness or increase pain      · You will receive a Post Operative Call from one of the Recovery Room Nurses on the day after your surgery to check on you. It is very important for us to know how you are recovering after your surgery. If you have an issue or need to speak with someone, please call your surgeon, do not wait for the post operative call.     · You may receive an e-mail or letter in the mail from [address] [place] [address] regarding your experience with us in the Ambulatory Surgery Unit. Your feedback is valuable to us and we appreciate your participation in the survey. · If the above instructions are not adequate or you are having problems after your surgery, call the physician at their office number. · We wish you a speedy recovery ? What to do at Home:      *  Please give a list of your current medications to your Primary Care Provider. *  Please update this list whenever your medications are discontinued, doses are      changed, or new medications (including over-the-counter products) are added. *  Please carry medication information at all times in case of emergency situations. These are general instructions for a healthy lifestyle:    No smoking/ No tobacco products/ Avoid exposure to second hand smoke    Surgeon General's Warning:  Quitting smoking now greatly reduces serious risk to your health. Obesity, smoking, and sedentary lifestyle greatly increases your risk for illness    A healthy diet, regular physical exercise & weight monitoring are important for maintaining a healthy lifestyle    You may be retaining fluid if you have a history of heart failure or if you experience any of the following symptoms:  Weight gain of 3 pounds or more overnight or 5 pounds in a week, increased swelling in our hands or feet or shortness of breath while lying flat in bed. Please call your doctor as soon as you notice any of these symptoms; do not wait until your next office visit. Recognize signs and symptoms of STROKE:    B - Balance  E - Eyes    F-  Face looks uneven  A-  Arms unable to move or move even  S-  Speech slurred or non-existent  T-  Time-call 911 as soon as signs and symptoms begin-DO NOT go       Back to bed or wait to see if you get better-TIME IS BRAIN. If you have not received your influenza and/or pneumococcal vaccine, please follow up with your primary care physician.     The discharge information has been reviewed with the patient and caregiver. The patient and caregiver verbalized understanding. TO PREVENT AN INFECTION      1. [address] YOUR HANDS     To prevent infection, good handwashing is the most important thing you or your caregiver can do.  Wash your hands with soap and water or use the hand  we gave you before you touch any wounds. 2. SHOWER     Use the antibacterial soap we gave you when you take a shower.  Shower with this soap until your wounds are healed.  To reach all areas of your body, you may need someone to help you.  Dont forget to clean your belly button with every shower. 3.  USE CLEAN SHEETS     Use freshly cleaned sheets on your bed after surgery.  To keep the surgery site clean, do not allow pets to sleep with you while your wound is still healing. 4. STOP SMOKING     Stop smoking, or at least cut back on smoking     Smoking slows your healing. 5.  CONTROL YOUR BLOOD SUGAR     High blood sugars slow wound healing.  If you are diabetic, control your blood sugar levels before and after your surgery. SCOPOLAMINE TRANSDERMAL PATCH      Scopolamine (Absorbed through the skin)  Scopolamine ([id number])    Prevents nausea and vomiting caused by motion sickness or anesthesia and surgery in adults. Remove patch in 24 hours. Brand Name(s):Transderm Scop  There may be other brand names for this medicine. When This Medicine Should Not Be Used: You should not use this medicine if you have had an allergic reaction to scopolamine, or if you have narrow angle glaucoma. After you take off the patch, wash the place where the patch was and your hands thoroughly. How to Dispose of This Medicine: Fold the used patch in half with the sticky sides together. Throw any used patch away so that children or pets cannot get to it.    Keep all medicine away from children and never share your medicine with anyone. Drugs and Foods to Avoid:  Ask your doctor or pharmacist before using any other medicine, including over-the-counter medicines, vitamins, and herbal products. Tell your doctor if you are using any medicines that make you sleepy. These include sleeping pills, cold and allergy medicine, narcotic pain relievers, and sedatives. Do not drink alcohol while you are using this medicine. Warnings While Using This Medicine:  Make sure your doctor knows if you are pregnant or breastfeeding, or if you have glaucoma, prostate problems, trouble urinating, blocked bowels, liver disease, kidney disease, or a history of seizures or mental illness. This medicine can cause blurring of vision and other vision problems if it comes in contact with the eyes. This medicine may also cause problems with urination. If any of these reactions occur, remove the patch and call your doctor right away. This medicine may make you dizzy or drowsy. Avoid driving, using machines, or doing anything else that could be dangerous if you are not alert. If you plan to participate in underwater sports, this medicine may cause disorienting effects. If this is a concern for you, talk with your doctor. Possible Side Effects While Using This Medicine:  Call your doctor right away if you notice any of these side effects: Allergic reaction: Itching or hives, swelling in your face or hands, swelling or tingling in your mouth or throat, chest tightness, trouble breathing. Blurred vision. Confusion or memory loss. Fast, slow, or uneven heartbeat. Lightheadedness, dizziness, drowsiness, or fainting. Seeing, hearing, or feeling things that are not there. Severe eye pain. Trouble urinating. If you notice these less serious side effects, talk with your doctor:  Dry mouth. Dry, itchy, or red eyes. Restlessness. Skin rash or redness.   If you notice other side effects that you think are caused by this medicine, tell your doctor. Call your doctor for medical advice about side effects. You may report side effects to FDA at 0-355-FDA-5118  © 6865-6972 [employer] All rights reserved.   Scopolamine (Transdermal) (Patch, Extended Release) - DrugNote, English  Generated on Thursday, September 15, 2011 1:38:05 PM

## 2019-05-16 NOTE — ANESTHESIA PREPROCEDURE EVALUATION
Anesthetic History No history of anesthetic complications Review of Systems / Medical History Patient summary reviewed, nursing notes reviewed and pertinent labs reviewed Pulmonary Within defined limits Neuro/Psych Headaches (migraines) Cardiovascular Within defined limits Exercise tolerance: >4 METS Comments: Post-chemo ECHO normal  
GI/Hepatic/Renal 
Within defined limits Endo/Other Cancer (breast, left s/p chemo) Other Findings Physical Exam 
 
Airway Mallampati: I 
TM Distance: > 6 cm Neck ROM: normal range of motion Mouth opening: Normal 
 
 Cardiovascular Rhythm: regular Rate: normal 
 
 
 
 Dental 
No notable dental hx Pulmonary Breath sounds clear to auscultation Abdominal 
GI exam deferred Other Findings Anesthetic Plan ASA: 2 Anesthesia type: general 
 
 
 
 
Induction: Intravenous Anesthetic plan and risks discussed with: Patient PONV prophylaxis/ scopolamine patch

## 2019-05-16 NOTE — OP NOTES
[facility]  OPERATIVE REPORT    Name:  Chichi Cruz  MR#:  [id number]  :  1970  ACCOUNT #:  [de-identified]  DATE OF SERVICE:  2019      PREOPERATIVE DIAGNOSIS:  Left breast cancer, upper outer quadrant. POSTOPERATIVE DIAGNOSIS:  Left breast cancer, upper outer quadrant. PROCEDURE PERFORMED:  Left breast ultrasound-guided lumpectomy, left sentinel lymph node biopsy, intraoperative margin assessment using Arc spectroscopy. SURGEON:  French Chandler MD    ASSISTANT:  Elliot Charles MD    ANESTHESIA:  General.    COMPLICATIONS:  None. SPECIMENS REMOVED:  1. Left axillary sentinel node #1.  2.  Left axillary sentinel node #2. 3.  Left axillary sentinel node #3. 4.  Left breast lumpectomy. IMPLANTS:  No implants. ESTIMATED BLOOD LOSS:  Minimal.    FINDINGS:  [place] lymph nodes negative. INDICATION FOR PROCEDURE:  This is a 70-year-old female with left breast cancer in upper outer quadrant. She has stage III metastasis the internal mammary nodes and supraclavicular nodes. She had neoadjuvant chemotherapy, which responded well to her treatment and had a complete response on MRI and was scheduled for lumpectomy, left axillary sentinel node biopsy. PROCEDURE IN DETAIL:  The patient initially went to Nuclear Medicine where technetium-99 was injected in the left breast, she tolerated this well. She went to the preop holding area where surgical site was marked by surgeon, informed consent was obtained. She was taken to the operating room, laid supine position where general endotracheal anesthesia was induced. Left breast was prepped and draped in the usual fashion and time-out was performed. Attention was turned to the left axilla and inferior axillary hairline incision was made with a #10 blade. Bovie cautery was used to dissect through the axillary fascia.   The sentinel lymph nodes were identified using Neoprobe guidance and excised using LigaSure and found to be negative on frozen section. The cavity was irrigated, 20 mL of local anesthetic was injected in the axilla. Axillary fascia was closed with interrupted 3-0 Vicryl and the skin with 4-0 subcuticular Monocryl. Next, attention was turned to 10-11 o'clock in the left breast.  The clip in cavity was identified using ultrasound guidance. Curvilinear incision was made with a #10 blade. Bovie cautery was used to dissect around lesion all the way to the chest wall. This was marked short superior and long stitch lateral.  The MarginProbe device was plugged in and calibrated. All 6 margins were assessed. No additional margins were resected. Total intraoperative time was 2 minutes. Breast cavity was irrigated. The tissues were anesthetized with 20 mL of local anesthetic. The deeper tissues were closed with interrupted 3-0 Vicryl and the skin with interrupted 3-0 Vicryl and 4-0 subcuticular Monocryl. Skin glue was placed on the incision as well as pressure dressing and a bra. All sponge and instrument counts were count. The patient went to Recovery in stable condition.       Chantelle Rodriguez MD      [id number]  D:  05/16/2019 13:46  T:  05/16/2019 14:50  JOB #:  [id number]

## 2019-05-16 NOTE — PERIOP NOTES
1347-Pt. To pacu, vss.  dermabond to left breast intact. Pt. C/o headache and nausea. LR infusing. quease ease placed near pt. 
 
1355-Pt. C/o headache, soreness to left breast level 3/10. Pt. Shivering. Warm blankets placed on pt. Medicated w/demerol 12.5mg iv for shivering. 1410-Pt. Denies nausea and pain. Shivering has stopped. C/o feeling dizzy. Will monitor. 1430-Pt. Lying in bed resting w/eyes closed. 1445-Discharge instructions reviewed w/pt. And friend. They verbalized understanding. Prescriptions given. 1507-Pt. And friend stated ready for discharge. Vss. Denies pain. No nausea. dermabond to left breast intact. Lungs clear. Pt. Assisted to bathroom to void. Discharged to car via wheelchair w/all belongings.

## 2019-05-17 ENCOUNTER — TELEPHONE (OUTPATIENT)
Dept: SURGERY | Age: 49
End: 2019-05-17

## 2019-05-17 NOTE — TELEPHONE ENCOUNTER
Called patient to see how she is doing after surgery. She is doing well. No questions. Has post op 5/29. She was appreciative of the call.

## 2019-05-28 ENCOUNTER — TELEPHONE (OUTPATIENT)
Dept: SURGERY | Age: 49
End: 2019-05-28

## 2019-05-28 NOTE — TELEPHONE ENCOUNTER
Returned patient's call. She was calling back from last week when Dr. Rakesh Estevez called her with her pathology report. I informed her that she had a PCR and had no further disease at the time of her lumpectomy. She is upset on the phone today; mother was recently diagnosed with cervical cancer, sounds like stage IV, and she is in hospice. She is feeling well, only has some mild soreness. She was very appreciative of the good news. I provided emotional support for patient.

## 2019-05-29 ENCOUNTER — OFFICE VISIT (OUTPATIENT)
Dept: SURGERY | Age: 49
End: 2019-05-29

## 2019-05-29 VITALS — HEIGHT: 65 IN | BODY MASS INDEX: 25.99 KG/M2 | WEIGHT: 156 LBS

## 2019-05-29 DIAGNOSIS — Z98.890 S/P LUMPECTOMY, LEFT BREAST: Primary | ICD-10-CM

## 2019-05-29 NOTE — PROGRESS NOTES
HISTORY OF PRESENT ILLNESS  Tay Amleida is a 54 y.o. female. HPI ESTABLISHED patient here for post op visit s/p LEFT breast lumpectomy and LEFT SLNB on 5/16/19. Overall doing well. Just some soreness in LEFT axilla.      Breast cancer-  Stage 3 infiltrating ductal carcinoma, Tumor size 0.8 mm, LN -v3, grade 3, ER 0, OH 15%, Her 2 -ve. High risk mammaprint. + supraclav node  Enlarged supraclavicular LN on the same side which biopsy showed invasive breast cancer. Pet avid supraclav and IM nodes on left  11/8/18- port-a-cath insertion  11/2018 through 3/2019 - neoadjuvant chemotherapy - Dr. 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 5/16/19 - LEFT breast lumpectomy and LEFT SLNB. Surg path showed complete response. Nodes negative. No family history of breast or ovarian cancer. Patient is genetic test negative [facility] 10/2018)    Breast imaging-  Breast MRI 4/11/19   LEFT breast diagnostic mammogram 9/2018  Screening mammogram 8/2018    FINAL PATHOLOGIC DIAGNOSIS   1. Left axillary sentinel node #1, biopsy:   One lymph node, negative for carcinoma (levels and cytokeratin stain examined)   2. Left axillary sentinel node #2, biopsy:   Benign fatty tissue, negative for lymph node and carcinoma   3. Left axillary sentinel node #3, biopsy:   One fatty replaced lymph node, negative for carcinoma (levels and cytokeratin stain examined)   4.  Left breast, lumpectomy without wire-guided localization:   No residual carcinoma identified   Biopsy site   INVASIVE CARCINOMA OF THE BREAST   SPECIMEN   Procedure: Excision (less than total mastectomy)   Specimen Laterality: Left   TUMOR   Tumor Site: Invasive Carcinoma: Not specified   Histologic Type: No residual invasive carcinoma   Histologic Grade ([place] Histologic Score) : No residual   invasive carcinoma   Ductal Carcinoma In Situ (DCIS): Not identified   Lobular Carcinoma In Situ (LCIS): No LCIS in specimen   Tumor Extent   Skin: Skin is not present   Accessory Findings   Lymphovascular Invasion: Not identified   Dermal Lymphovascular Invasion: No skin present   Microcalcifications: Not identified   Treatment Effect in the Breast: No residual invasive carcinoma is   present in the breast after presurgical therapy   Treatment Effect in the Lymph Nodes: No lymph node metastases and no   prominent fibrous scarring in the nodes   MARGINS   Invasive Carcinoma Margins: Uninvolved by invasive carcinoma   Distance from Closest Margin in Millimeters: Cannot be   determined: No residual carcinoma   Closest Margin: Cannot be determined: No residual carcinoma   LYMPH NODES   Regional Lymph Nodes: Uninvolved by tumor cells   Number of Lymph Nodes Examined: 2   Number of [place] Nodes Examined: 2   PATHOLOGIC STAGE CLASSIFICATION (pTNM, AJCC 8th Edition)   TNM Descriptors: y (post-treatment)   Primary Tumor (Invasive Carcinoma) (pT): pT0   Regional Lymph Nodes (pN)   Modifier: (sn): Only sentinel node(s) evaluated. Category (pN): pN0   HORMONE RECEPTOR and HER-2 status from  , left supraclavicular lymph node:   Estrogen receptor: Negative   Progesterone receptor: Negative   HER-2 IHC: Negative   HORMONE RECEPTOR and HER-2 status from [facility] , left breast core needle biopsy:   Estrogen receptor: Negative   Progesterone receptor: Positive, weak   HER-2 IHC: Negative     Review of Systems   All other systems reviewed and are negative. Physical Exam   Pulmonary/Chest:       Incisions healing well  No seroma   Nursing note and vitals reviewed. ASSESSMENT and PLAN    ICD-10-CM ICD-9-CM    1. S/P lumpectomy, left breast Z98.890 V36.80      55 yo female s/p left lumpectomy, sln biopsy  Received neoadjuvant chemo for supraclav nodes, im nodes  pcr on surg path  Will refer to rad onc  See us back after xrt.    She was happy with plan

## 2019-05-31 ENCOUNTER — OFFICE VISIT (OUTPATIENT)
Dept: ONCOLOGY | Age: 49
End: 2019-05-31

## 2019-05-31 VITALS
DIASTOLIC BLOOD PRESSURE: 69 MMHG | RESPIRATION RATE: 18 BRPM | OXYGEN SATURATION: 98 % | TEMPERATURE: 98.1 F | HEART RATE: 75 BPM | SYSTOLIC BLOOD PRESSURE: 115 MMHG | BODY MASS INDEX: 25.66 KG/M2 | HEIGHT: 65 IN | WEIGHT: 154 LBS

## 2019-05-31 DIAGNOSIS — Z17.1 MALIGNANT NEOPLASM OF UPPER-INNER QUADRANT OF LEFT BREAST IN FEMALE, ESTROGEN RECEPTOR NEGATIVE (HCC): Primary | ICD-10-CM

## 2019-05-31 DIAGNOSIS — C50.212 MALIGNANT NEOPLASM OF UPPER-INNER QUADRANT OF LEFT BREAST IN FEMALE, ESTROGEN RECEPTOR NEGATIVE (HCC): Primary | ICD-10-CM

## 2019-05-31 NOTE — PROGRESS NOTES
Discharge Summary       PATIENT ID: Beltran Sheppard  MRN: [id number]   YOB: 1980    DATE OF ADMISSION: 7/18/2017 12:55 PM    DATE OF DISCHARGE: Sadia Carrizales MD   PRIMARY CARE PROVIDER: Araseli Win DO     ATTENDING PHYSICIAN: Sadia Carrizales MD  DISCHARGING PROVIDER: Iliana Joshi MD    To contact this individual call [phone] and ask the  to page. If unavailable ask to be transferred the Adult Hospitalist Department. CONSULTATIONS: IP CONSULT TO HOSPITALIST    PROCEDURES/SURGERIES: * No surgery found *    ADMITTING DIAGNOSES & HOSPITAL COURSE:     This is a 72-year-old G4, P2, 32 weeks pregnant female with a past medical history of diabetes mellitus type 2. She is being followed by Dr. Belinda Ahmadi for her diabetes Last seen by him about a week ago and on day of admission, the patient was seen by Dr. Cecilio Curiel, who is the [facility] OB[facility]GY[facility] for the patient. Both Dr. Belinda Ahmadi as well as Dr. Freeman Ulrich had concerns about her uncontrolled blood sugars and that is why the patient was sent to [address] [place] [address] for blood sugar management. The patient checks her blood sugars 6 times daily and her usual blood sugars are fasting between 180 to 240 and post meal, her blood sugars are 180 to 220 and she takes Levemir 60 units every night (which was increased about 1 week ago from 50 units). Also, she takes Humalog 80 units with meals and checks her blood sugar after meals and if it is more than 200, then she gives herself 20 more units. She claims that occasionally she would have low blood sugars and she has had low blood sugars as low as 45 (last one month she had atleast 5 hypoglycemic episodes).      DM-II in a pregnancy poorly controlled   -A1c 7.5  -finger stick glucose improving range 70-, has also reading in 70 - 161/ 24 hrs, feels better,  continue lantus 65 units, lispro 60 units premeal, sliding scale and 2 hours post prandial,   --appreciate endocrinologist input, outpatient Identified pt with two pt identifiers(name and ). Reviewed record in preparation for visit and have obtained necessary documentation. Chief Complaint   Patient presents with    Breast Cancer     lumpectomy 19      Visit Vitals  /69 (BP 1 Location: Right arm, BP Patient Position: Sitting)   Pulse 75   Temp 98.1 °F (36.7 °C) (Oral)   Resp 18   Ht 5' 5\" (1.651 m)   Wt 154 lb (69.9 kg)   SpO2 98%   BMI 25.63 kg/m²       Health Maintenance Due   Topic    Pneumococcal 0-64 years (1 of 3 - PCV13)    DTaP/Tdap/Td series (1 - Tdap)    PAP AKA CERVICAL CYTOLOGY        Coordination of Care Questionnaire:  :   1) Have you been to an emergency room, urgent care, or hospitalized since your last visit? If yes, where when, and reason for visit? no       2. Have seen or consulted any other health care provider since your last visit? If yes, where when, and reason for visit? NO      3) Do you have an Advanced Directive/ Living Will in place? NO  If yes, do we have a copy on file NO  If no, would you like information NO    Patient is accompanied by self I have received verbal consent from Collette Spar to discuss any/all medical information while they are present in the room. follow up with Endocrinologist  -DTC on board   [facility] 27 weeks 5 days  -management per ObGyn   -no abdominal pain   Mild hyponatremia   -repeated and resolved   Thrombocytopenia unclear etiology  -improving, repeat cbc in am     Code status: Full  Code  DVT prophylaxis:SCD        DISCHARGE DIAGNOSES / PLAN:      DM-II in a pregnancy poorly controlled   -continue lantus 65 units, lispro 60 units premeal, sliding scale and 2 hours post prandial,   -outpatient follow up with Endocrinologist  [facility] 27 weeks 5 days  -outpatient follow up ObGyn   Mild hyponatremia   -resolved   Thrombocytopenia unclear etiology  -improving  -outpatient follow up as an outpatient     Code status: Full  Code  DVT prophylaxis:SCD    PENDING TEST RESULTS:   At the time of discharge the following test results are still pending: none    FOLLOW UP APPOINTMENTS:    Follow-up Information     Follow up With Details Comments Contact Jyotsna Costa DO In one week  [address]  [phone]             ADDITIONAL CARE RECOMMENDATIONS:     DIET: Diabetic Diet      ACTIVITY: Activity as tolerated    WOUND CARE: none    EQUIPMENT needed: none      DISCHARGE MEDICATIONS:  Current Discharge Medication List      START taking these medications    Details   famotidine (PEPCID) 20 mg tablet Take 1 Tab by mouth every twelve (12) hours as needed (Indigestion). Qty: 20 Tab, Refills: 0         CONTINUE these medications which have CHANGED    Details   insulin glargine (LANTUS,BASAGLAR) 100 unit/mL (3 mL) inpn Up to 65 units subcutaneously at each bedtime  Qty: 15 Pen, Refills: 3         CONTINUE these medications which have NOT CHANGED    Details   acetaminophen (TYLENOL) 325 mg tablet Take 650 mg by mouth every four (4) hours as needed for Pain. PNV with Ca,No.71-Iron-FA 27-1 mg tab Take  by mouth.       insulin lispro (HUMALOG) 100 unit/mL kwikpen Up to 90 units with each meal  Indications: Diabetes in pregnancy, uncontrolled  Qty: 6 Package, Refills: 3    Comments: Using 70-90 units per meal in pregnancy. Please assure minimum of a month supply. glucose blood VI test strips (ONE TOUCH VERIO) strip Use to test blood glucose 5x daily  Qty: 100 Strip, Refills: 11    Associated Diagnoses: Type II or unspecified type diabetes mellitus without mention of complication, uncontrolled; Pregnancy; Diabetes mellitus complicating pregnancy, antepartum         STOP taking these medications       aspirin 81 mg chewable tablet Comments:   Reason for Stopping:                 NOTIFY YOUR PHYSICIAN FOR ANY OF THE FOLLOWING:   Fever over 101 degrees for 24 hours. Chest pain, shortness of breath, fever, chills, nausea, vomiting, diarrhea, change in mentation, falling, weakness, bleeding. Severe pain or pain not relieved by medications. Or, any other signs or symptoms that you may have questions about.     DISPOSITION:   x Home With:   OT  PT  HH x RN       Long term SNF/Inpatient Rehab    Independent/assisted living    Hospice    Other:       PATIENT CONDITION AT DISCHARGE:     Functional status    Poor     Deconditioned    x Independent      Cognition    x Lucid     Forgetful     Dementia      Catheters/lines (plus indication)    Norris     PICC     PEG    x None      Code status   x  Full code     DNR      PHYSICAL EXAMINATION AT DISCHARGE:   Refer to Progress Note      CHRONIC MEDICAL DIAGNOSES:  Problem List as of 7/24/2017  Date Reviewed: 7/18/2017          Codes Class Noted - Resolved    * (Principal)Uncontrolled diabetes mellitus ([facility] ICD-10-CM: E11.65  ICD-9-CM: 250.02  7/18/2017 - Present        Diabetes in pregnancy ICD-10-CM: O24.919  ICD-9-CM: 648.00  7/18/2017 - Present        Pregnant ICD-10-CM: Z33.1  ICD-9-CM: V22.2  4/7/2017 - Present    Overview Addendum 4/9/2017  8:46 PM by Fifi Diane MD     Use LMP for [facility]  IDDM-sees MFM and endocrinologist in [place]  H/O C/S x2 for repeat  Low dose ASA  Needs 24 h urine and PIH baseline labs             Microalbuminuria ICD-10-CM: R80.9  ICD-9-CM: 791.0  2016 - Present        Mixed hyperlipidemia ICD-10-CM: E78.2  ICD-9-CM: 272.2  2015 - Present        BMI 34.0-34.9,adult ICD-10-CM: Z68.34  ICD-9-CM: V85.34  2015 - Present        Medically noncompliant ICD-10-CM: Z91.19  ICD-9-CM: V15.81  2015 - Present        Labor and delivery complicated by fetal stress ICD-10-CM: O77.9  ICD-9-CM: 656.80  2014 - Present        H/O:  ICD-10-CM: Z98.891  ICD-9-CM: V45.89  2014 - Present        DM (diabetes mellitus), gestational ICD-10-CM: O24.419  ICD-9-CM: 648.80  2014 - Present        Diabetes mellitus complicating pregnancy, antepartum ICD-10-CM: O24.919  ICD-9-CM: 648.03, 250.00  2014 - Present        Obesity ICD-10-CM: E66.9  ICD-9-CM: 278.00  2013 - Present        Hyperlipidemia ICD-10-CM: E78.5  ICD-9-CM: 272.4  2013 - Present        Type II diabetes mellitus, uncontrolled ([facility] ICD-10-CM: E11.65  ICD-9-CM: 250.02  2013 - Present        RESOLVED: Pregnancy ICD-10-CM: Z33.1  ICD-9-CM: V22.2  3/11/2014 - 2017    Overview Addendum 2014  4:02 PM by Jens Mccormack MD     H/o C/S-no records/for repeat C/S  Type 2 DM on insulin-poor compliance/AFP tetra normal   POLO screen positive-repeat at 16 w/alert blood bank at delivery  U/s q4 weeks & BPPs @32w and weekly/deliver @ 39w by C/S  Male                   Greater than 25 minutes were spent with the patient on counseling and coordination of care    Signed:   Shayla Cheema MD  2017  9:13 AM

## 2019-05-31 NOTE — PROGRESS NOTES
[address] [facility]  [address], [address] [facility] [place], [address] [facility]  [phone]      Follow-up Note        Patient: Michael Denis MRN: [id number]  SSN: [id number]    YOB: 1970  Age: 54 y.o. Sex: female      Diagnosis:     1. Left breast carcinoma: Dx: 9/17/2018  T1b N3a M0 (Stage IIIC) infiltrating ductal carcinoma, Tumor size 0.8 mm, LN +ve, grade 3, ER 0, NH 15%, Her 2 -ve     Treatment:     1. Neoadjuvant chemotherapy   Adriamycin + Cytoxan - s/p 4 cycles - completed 12/26/2108              Taxol week 11 of 12    Subjective:      Michael Denis is a 54 y.o. female with a new diagnosis of left sided invasive breast carcinoma. She underwent a screening mammogram and was noted to have a density in the upper inner quadrant of the left breast. A biopsy of the lesion showed a diagnosis of invasive breast carcinoma ER -ve NH 15% and Her 2 negative. An MRI did not reveal any additional abnormalities. She has an enlarged supraclavicular LN on the same side which biopsy showed invasive breast cancer. She is receiving neoadjuvant chemotherapy. Taxol was dose reduced 20% d/t neuropathy. She is feeling well with no complaints.        Review of Systems:    Constitutional: fatigue  Eyes: negative  Ears, Nose, Mouth, Throat, and Face: negative  Respiratory: negative  Cardiovascular: negative  Gastrointestinal: nausea  Genitourinary:negative  Integument/Breast: negative  Hematologic/Lymphatic: palpable left neck mass  Musculoskeletal:negative  Neurological: neuropathy of finger tips and feet        Past Medical History:   Diagnosis Date    Breast cancer ([address]     Frequent headaches     Migraine     24 years     Past Surgical History:   Procedure Laterality Date    HX BREAST LUMPECTOMY Left 5/16/2019    LEFT BREAST LUMPECTOMY/LEFT SENTINEL NODE BIOPSY performed by Wolfgang Estrada MD at [facility] AM[facility]UL[facility]    HX DILATION AND CURETTAGE      2010    IR INSERT TUNL CVAD W PORT LESS THAN 5 YR Right 11/08/2018    right per op note      Family History   Problem Relation Age of Onset    Headache Mother     Headache Maternal Aunt      Social History     Tobacco Use    Smoking status: Never Smoker    Smokeless tobacco: Never Used   Substance Use Topics    Alcohol use: Yes     Comment: social      Prior to Admission medications    Medication Sig Start Date End Date Taking? Authorizing Provider   MULTIVITAMIN PO Take 1 Tab by mouth daily. Yes Provider, Historical   SUMAtriptan succinate (ZEMBRACE SYMTOUCH) 3 mg/0.5 mL pnij 1 Syringe by SubCUTAneous route as needed. 1 at HA onset and repeat q 1 hour until WHELAN relieved or until used 4 in 24 hours 10/7/18  Yes Kavitha Powell NP   cyclobenzaprine (FLEXERIL) 10 mg tablet TK 1 T PO TID PRN 3/13/18  Yes Provider, Historical   SUMAtriptan (IMITREX) 100 mg tablet Take 1tab at the onset of HA Repeat in 2 hours. MAX 2 tabs  in 24hrs 3/22/18  Yes Kavitha Powell NP          Allergies   Allergen Reactions    Latex Itching     Skin gets very dry         Objective:     Visit Vitals  /69 (BP 1 Location: Right arm, BP Patient Position: Sitting)   Pulse 75   Temp 98.1 °F (36.7 °C) (Oral)   Resp 18   Ht 5' 5\" (1.651 m)   Wt 154 lb (69.9 kg)   SpO2 98%   BMI 25.63 kg/m²       Pain Scale: 1/10  Pain Location: Arm      Physical Exam:    GENERAL: alert, cooperative, no distress, appears stated age  EYE: negative  LYMPHATIC: left supraclavicular/lower cervical node feels smaller in size  THROAT & NECK: normal and no erythema or exudates noted.    LUNG: clear to auscultation bilaterally  HEART: regular rate and rhythm  ABDOMEN: soft, non-tender  EXTREMITIES:  no edema  SKIN: no rash  NEUROLOGIC: negative      Lab Results   Component Value Date/Time    WBC 2.7 (L) 04/05/2019 11:41 AM    HGB 12.4 04/05/2019 11:41 AM    HCT 36.9 04/05/2019 11:41 AM    PLATELET 120 07/87/3803 11:41 AM    MCV 91.6 04/05/2019 11:41 AM       Lab Results   Component Value Date/Time    Sodium 140 03/22/2019 11:33 AM    Potassium 4.0 03/22/2019 11:33 AM    Chloride 106 03/22/2019 11:33 AM    CO2 29 03/22/2019 11:33 AM    Anion gap 5 03/22/2019 11:33 AM    Glucose 105 (H) 03/22/2019 11:33 AM    BUN 17 03/22/2019 11:33 AM    Creatinine 0.73 03/22/2019 11:33 AM    BUN/Creatinine ratio 23 (H) 03/22/2019 11:33 AM    GFR est AA >60 03/22/2019 11:33 AM    GFR est non-AA >60 03/22/2019 11:33 AM    Calcium 9.0 03/22/2019 11:33 AM    Bilirubin, total 0.3 03/22/2019 11:33 AM    AST (SGOT) 42 (H) 03/22/2019 11:33 AM    Alk. phosphatase 116 03/22/2019 11:33 AM    Protein, total 7.0 03/22/2019 11:33 AM    Albumin 3.4 (L) 03/22/2019 11:33 AM    Globulin 3.6 03/22/2019 11:33 AM    A-G Ratio 0.9 (L) 03/22/2019 11:33 AM    ALT (SGPT) 77 03/22/2019 11:33 AM         Assessment:     1. Left breast carcinoma:  T1b N3a M0 (Stage IIIC) infiltrating ductal carcinoma, Tumor size 0.8 mm, LN +ve, grade 3, ER 0, [place] 15%, Her 2 -ve      ECOG PS 0  Intent of Treatment - curative  Prognosis- excellent    LVEF - 54% - 11/9/2018. Repeat 1/4/2019 55%    PET - 11/7/2018 - mildly hypermetabolic left supraclavicular and left MICHAEL lymph nodes. Receiving neoadjuvant chemotherapy   Adriamycin/Cyclophosphamide completed 4 cycles - 12/26/2018             Taxol completed 4/5/2019       2. Neuropathy, mild/minimal    Finger tips and feet  Dose reduce Paclitaxel 20% starting week #7      Plan:           Signed by: Jemima Oliva NP                     May 31, 2019        CC. Leigh Tesfaye MD  CC.  Carolina Kevin MD

## 2019-06-10 NOTE — PROGRESS NOTES
2001 [facility]  [address], [address] [facility] [address], [address] [facility]  [phone]      Follow-up Note        Patient: Tiarra Pan MRN: [id number]  SSN: [id number]    YOB: 1970  Age: 54 y.o. Sex: female      Diagnosis:     1. Left breast carcinoma: Dx: 9/17/2018  T1b N3a M0 (Stage IIIC) infiltrating ductal carcinoma, Tumor size 0.8 mm, LN +ve, grade 3, ER 0, GA 15%, Her 2 -ve     Treatment:     1. Neoadjuvant chemotherapy   Adriamycin + Cytoxan followed by weekly Taxol  2. S/P left sided breast lumpectomy and LN excision 05/16/2019    Subjective:      Tiarra Pan is a 54 y.o. female with a diagnosis of left sided invasive breast carcinoma. She underwent a screening mammogram and was noted to have a density in the upper inner quadrant of the left breast. A biopsy of the lesion showed a diagnosis of invasive breast carcinoma ER -ve GA 15% and Her 2 negative. An MRI did not reveal any additional abnormalities. She had an enlarged supraclavicular LN on the same side which biopsy showed invasive breast cancer. She received neoadjuvant chemotherapy. She underwent left sided lumpectomy and sentinel LN excision on 05/16/2019. She is doing well. She comes in for follow up.        Review of Systems:    Constitutional: fatigue  Eyes: negative  Ears, Nose, Mouth, Throat, and Face: negative  Respiratory: negative  Cardiovascular: negative  Gastrointestinal: nausea  Genitourinary:negative  Integument/Breast: negative  Hematologic/Lymphatic: negative  Musculoskeletal:negative  Neurological: negative        Past Medical History:   Diagnosis Date    Breast cancer ([facility] [address]     Frequent headaches     Migraine     22 years     Past Surgical History:   Procedure Laterality Date    HX BREAST LUMPECTOMY Left 5/16/2019    LEFT BREAST LUMPECTOMY/LEFT SENTINEL NODE BIOPSY performed by Marcus Tineo MD at hospitals AMBULATORY OR    HX DILATION AND CURETTAGE      2010    IR INSERT TUNL CVAD W PORT LESS THAN 5 YR Right 11/08/2018    right per op note      Family History   Problem Relation Age of Onset    Headache Mother     Headache Maternal Aunt      Social History     Tobacco Use    Smoking status: Never Smoker    Smokeless tobacco: Never Used   Substance Use Topics    Alcohol use: Yes     Comment: social      Prior to Admission medications    Medication Sig Start Date End Date Taking? Authorizing Provider   MULTIVITAMIN PO Take 1 Tab by mouth daily. Yes Provider, Historical   SUMAtriptan succinate (ZEMBRACE SYMTOUCH) 3 mg/0.5 mL pnij 1 Syringe by SubCUTAneous route as needed. 1 at HA onset and repeat q 1 hour until WHELAN relieved or until used 4 in 24 hours 10/7/18  Yes Hilario Schmidt NP   cyclobenzaprine (FLEXERIL) 10 mg tablet TK 1 T PO TID PRN 3/13/18  Yes Provider, Historical   SUMAtriptan (IMITREX) 100 mg tablet Take 1tab at the onset of HA Repeat in 2 hours. MAX 2 tabs  in 24hrs 3/22/18  Yes Hilario Schmidt NP          Allergies   Allergen Reactions    Latex Itching     Skin gets very dry         Objective:     Visit Vitals  /69 (BP 1 Location: Right arm, BP Patient Position: Sitting)   Pulse 75   Temp 98.1 °F (36.7 °C) (Oral)   Resp 18   Ht 5' 5\" (1.651 m)   Wt 154 lb (69.9 kg)   SpO2 98%   BMI 25.63 kg/m²       Pain Scale: 0/10  Pain Location:     Physical Exam:    GENERAL: alert, cooperative, no distress, appears stated age  EYE: negative  LYMPHATIC: no adenopathy  THROAT & NECK: normal and no erythema or exudates noted.    LUNG: clear to auscultation bilaterally  HEART: regular rate and rhythm  ABDOMEN: soft, non-tender  EXTREMITIES:  no edema  SKIN: no rash  NEUROLOGIC: negative      Lab Results   Component Value Date/Time    WBC 2.7 (L) 04/05/2019 11:41 AM    HGB 12.4 04/05/2019 11:41 AM    HCT 36.9 04/05/2019 11:41 AM    PLATELET 080 21/29/5517 11:41 AM    MCV 91.6 04/05/2019 11:41 AM       Lab Results   Component Value Date/Time Sodium 140 03/22/2019 11:33 AM    Potassium 4.0 03/22/2019 11:33 AM    Chloride 106 03/22/2019 11:33 AM    CO2 29 03/22/2019 11:33 AM    Anion gap 5 03/22/2019 11:33 AM    Glucose 105 (H) 03/22/2019 11:33 AM    BUN 17 03/22/2019 11:33 AM    Creatinine 0.73 03/22/2019 11:33 AM    BUN/Creatinine ratio 23 (H) 03/22/2019 11:33 AM    GFR est AA >60 03/22/2019 11:33 AM    GFR est non-AA >60 03/22/2019 11:33 AM    Calcium 9.0 03/22/2019 11:33 AM    Bilirubin, total 0.3 03/22/2019 11:33 AM    AST (SGOT) 42 (H) 03/22/2019 11:33 AM    Alk. phosphatase 116 03/22/2019 11:33 AM    Protein, total 7.0 03/22/2019 11:33 AM    Albumin 3.4 (L) 03/22/2019 11:33 AM    Globulin 3.6 03/22/2019 11:33 AM    A-G Ratio 0.9 (L) 03/22/2019 11:33 AM    ALT (SGPT) 77 03/22/2019 11:33 AM         Assessment:     1. Left breast carcinoma:  T1b N3a M0 (Stage IIIC) infiltrating ductal carcinoma, Tumor size 0.8 mm, LN +ve, grade 3, ER 0, OR 15%, Her 2 -ve      ECOG PS 0  Intent of Treatment - curative  Prognosis- excellent    LVEF - 54% - 11/9/2018. Repeat 1/4/2019 55%    PET - 11/7/2018 - mildly hypermetabolic left supraclavicular and left MICHAEL lymph nodes. Received neoadjuvant chemotherapy   Adriamycin/Cyclophosphamide followed by weekly Taxol    S/P left sided breast lumpectomy and LN excision 05/16/2019  Achieved pCR. She has an appointment with Dr. Diego Rocha for adjuvant radiation to the chest wall. Plan:       · Adjuvant radiation per Dr. Diego Rocha  · Return in 3 months       Signed by: Luis Wesley MD                     June 9, 2019        CC. Lorenzo Lundberg MD  CC. Jake Barr MD  CC.  Georges Petty MD

## 2019-06-17 ENCOUNTER — TELEPHONE (OUTPATIENT)
Dept: SURGERY | Age: 49
End: 2019-06-17

## 2019-06-17 NOTE — TELEPHONE ENCOUNTER
Patient left message asking about port removal.  When I called her back, she didn't answer, but I left message, re: Dr. Pablo Lopez does not remove port until after completion of radiation. I requested that she call our office to let us know about if/when she is getting radiation.

## 2019-08-01 DIAGNOSIS — C50.212 MALIGNANT NEOPLASM OF UPPER-INNER QUADRANT OF LEFT FEMALE BREAST, UNSPECIFIED ESTROGEN RECEPTOR STATUS (HCC): Primary | ICD-10-CM

## 2019-08-06 ENCOUNTER — DOCUMENTATION ONLY (OUTPATIENT)
Dept: SURGERY | Age: 49
End: 2019-08-06

## 2019-08-06 NOTE — PROGRESS NOTES
Received a message from this patient. Wanted to go over surgery info again. I told her as of right now surgery is at 9:15 am. We are instructed to have patients there 2 hours before, 7:15AM.  I did let her know if someone from the hospital called and gave her another time to listen to what they tell her. Patient appreciated the call.

## 2019-08-06 NOTE — PERIOP NOTES
[facility]  Ambulatory Surgery Unit  Pre-operative Instructions for Endo Procedures    Procedure Date  8/15/19            Tentative Arrival Time 0800      1. On the day of your procedure, please report to the Ambulatory Surgery Unit Registration Desk and sign in at your designated time. The Ambulatory Surgery Unit is located in [facility] on the [facility] side of the [facility] across from the [address] [place] [address]. Please have all of your health insurance cards and a photo ID. 2. You must have someone with you to drive you home, as you should not drive a car for 24 hours following anesthesia. Please make arrangements for a responsible adult friend or family member to stay with you for at least the first 24 hours after your procedure. 3. Do not have anything to eat or drink (including water, gum, mints, coffee, juice) after 11:59 PM 8/14/19. This may not apply to medications prescribed by your physician. (Please note below the special instructions with medications to take the morning of your procedure.)    4. If applicable, follow the clear liquid diet and bowel prep instructions provided by your physician's office. If you do not have this information, or have any questions, please contact your physician's office. 5. We recommend you do not drink any alcoholic beverages for 24 hours before and after your procedure. 6. Contact your surgeons office for instructions on the following medications: non-steroidal anti-inflammatory drugs (i.e. Advil, Aleve), vitamins, and supplements. (Some surgeons will want you to stop these medications prior to surgery and others may allow you to take them)   **If you are currently taking Plavix, Coumadin, Aspirin and/or other blood-thinning agents, contact your surgeon for instructions. ** Your surgeon will partner with the physician prescribing these medications to determine if it is safe to stop or if you need to continue taking.  Please do not stop taking these medications without instructions from your surgeon. 7. In an effort to help prevent surgical site infection, we ask that you shower with an anti-bacterial soap (i.e. Dial or Safeguard) on the morning of your procedure. Do not apply any lotions, powders, or deodorants after showering. 8. Wear comfortable clothes. Wear glasses instead of contacts. Do not bring any jewelry or money (other than copays or fees as instructed). Do not wear make-up, particularly mascara, the morning of your procedure. Wear your hair loose or down, no ponytails, buns, deanna pins or clips. All body piercings must be removed. 9. You should understand that if you do not follow these instructions your procedure may be cancelled. If your physical condition changes (i.e. fever, cold or flu) please contact your surgeon as soon as possible. 10. It is important that you be on time. If a situation occurs where you may be late, or if you have any questions or problems, please call [phone]. 11. Your procedure time may be subject to change. You will receive a phone call the day prior to confirm your arrival time. Special Instructions: Take all medications and inhalers, as prescribed, on the morning of surgery with a sip of water EXCEPT: none        I understand a pre-operative phone call will be made to verify my procedure time. In the event that I am not available, I give permission for a message to be left on my answering service and/or with another person? YES    Reviewed with pt on phone.  Pt verbalized understanding.         ___________________      ___________________      ___________________  (Signature of Patient)          (Witness)                   (Date and Time)

## 2019-08-14 ENCOUNTER — ANESTHESIA EVENT (OUTPATIENT)
Dept: SURGERY | Age: 49
End: 2019-08-14
Payer: COMMERCIAL

## 2019-08-15 ENCOUNTER — HOSPITAL ENCOUNTER (OUTPATIENT)
Age: 49
Setting detail: OUTPATIENT SURGERY
Discharge: HOME | End: 2019-08-15
Attending: SURGERY | Admitting: SURGERY
Payer: COMMERCIAL

## 2019-08-15 ENCOUNTER — ANESTHESIA (OUTPATIENT)
Dept: SURGERY | Age: 49
End: 2019-08-15
Payer: COMMERCIAL

## 2019-08-15 VITALS
TEMPERATURE: 97.5 F | OXYGEN SATURATION: 100 % | BODY MASS INDEX: 25.99 KG/M2 | HEIGHT: 65 IN | SYSTOLIC BLOOD PRESSURE: 131 MMHG | DIASTOLIC BLOOD PRESSURE: 78 MMHG | RESPIRATION RATE: 17 BRPM | WEIGHT: 156 LBS | HEART RATE: 64 BPM

## 2019-08-15 DIAGNOSIS — C50.212 MALIGNANT NEOPLASM OF UPPER-INNER QUADRANT OF LEFT FEMALE BREAST, UNSPECIFIED ESTROGEN RECEPTOR STATUS (HCC): ICD-10-CM

## 2019-08-15 LAB — HCG UR QL: NEGATIVE

## 2019-08-15 PROCEDURE — 77030021352 HC CBL LD SYS DISP COVD -B: Performed by: SURGERY

## 2019-08-15 PROCEDURE — 77030020255 HC SOL INJ LR 1000ML BG: Performed by: SURGERY

## 2019-08-15 PROCEDURE — 74011000250 HC RX REV CODE- 250: Performed by: SURGERY

## 2019-08-15 PROCEDURE — 76030000002 HC AMB SURG OR TIME FIRST 0.: Performed by: SURGERY

## 2019-08-15 PROCEDURE — 77030018836 HC SOL IRR NACL ICUM -A: Performed by: SURGERY

## 2019-08-15 PROCEDURE — 77030011640 HC PAD GRND REM COVD -A: Performed by: SURGERY

## 2019-08-15 PROCEDURE — 77030031139 HC SUT VCRL2 J&J -A: Performed by: SURGERY

## 2019-08-15 PROCEDURE — 74011250636 HC RX REV CODE- 250/636: Performed by: NURSE ANESTHETIST, CERTIFIED REGISTERED

## 2019-08-15 PROCEDURE — 74011250636 HC RX REV CODE- 250/636: Performed by: ANESTHESIOLOGY

## 2019-08-15 PROCEDURE — 77030002933 HC SUT MCRYL J&J -A: Performed by: SURGERY

## 2019-08-15 PROCEDURE — 76210000040 HC AMBSU PH I REC FIRST 0.5 HR: Performed by: SURGERY

## 2019-08-15 PROCEDURE — 77030039266 HC ADH SKN EXOFIN S2SG -A: Performed by: SURGERY

## 2019-08-15 PROCEDURE — 77030011267 HC ELECTRD BLD COVD -A: Performed by: SURGERY

## 2019-08-15 PROCEDURE — 76210000050 HC AMBSU PH II REC 0.5 TO 1 HR: Performed by: SURGERY

## 2019-08-15 PROCEDURE — 76060000073 HC AMB SURG ANES FIRST 0.5 HR: Performed by: SURGERY

## 2019-08-15 PROCEDURE — 81025 URINE PREGNANCY TEST: CPT

## 2019-08-15 RX ORDER — SODIUM CHLORIDE, SODIUM LACTATE, POTASSIUM CHLORIDE, CALCIUM CHLORIDE 600; 310; 30; 20 MG/100ML; MG/100ML; MG/100ML; MG/100ML
25 INJECTION, SOLUTION INTRAVENOUS CONTINUOUS
Status: DISCONTINUED | OUTPATIENT
Start: 2019-08-15 | End: 2019-08-15 | Stop reason: HOSPADM

## 2019-08-15 RX ORDER — ONDANSETRON 2 MG/ML
INJECTION INTRAMUSCULAR; INTRAVENOUS AS NEEDED
Status: DISCONTINUED | OUTPATIENT
Start: 2019-08-15 | End: 2019-08-15 | Stop reason: HOSPADM

## 2019-08-15 RX ORDER — SODIUM CHLORIDE 0.9 % (FLUSH) 0.9 %
5-40 SYRINGE (ML) INJECTION AS NEEDED
Status: DISCONTINUED | OUTPATIENT
Start: 2019-08-15 | End: 2019-08-15 | Stop reason: HOSPADM

## 2019-08-15 RX ORDER — HYDROMORPHONE HYDROCHLORIDE 1 MG/ML
.2-.5 INJECTION, SOLUTION INTRAMUSCULAR; INTRAVENOUS; SUBCUTANEOUS ONCE
Status: DISCONTINUED | OUTPATIENT
Start: 2019-08-15 | End: 2019-08-15 | Stop reason: HOSPADM

## 2019-08-15 RX ORDER — PROPOFOL 10 MG/ML
INJECTION, EMULSION INTRAVENOUS AS NEEDED
Status: DISCONTINUED | OUTPATIENT
Start: 2019-08-15 | End: 2019-08-15 | Stop reason: HOSPADM

## 2019-08-15 RX ORDER — FENTANYL CITRATE 50 UG/ML
INJECTION, SOLUTION INTRAMUSCULAR; INTRAVENOUS AS NEEDED
Status: DISCONTINUED | OUTPATIENT
Start: 2019-08-15 | End: 2019-08-15 | Stop reason: HOSPADM

## 2019-08-15 RX ORDER — KETAMINE HYDROCHLORIDE 100 MG/ML
INJECTION, SOLUTION INTRAMUSCULAR; INTRAVENOUS AS NEEDED
Status: DISCONTINUED | OUTPATIENT
Start: 2019-08-15 | End: 2019-08-15 | Stop reason: HOSPADM

## 2019-08-15 RX ORDER — OXYCODONE AND ACETAMINOPHEN 5; 325 MG/1; MG/1
1 TABLET ORAL
Status: DISCONTINUED | OUTPATIENT
Start: 2019-08-15 | End: 2019-08-15 | Stop reason: HOSPADM

## 2019-08-15 RX ORDER — DIPHENHYDRAMINE HYDROCHLORIDE 50 MG/ML
12.5 INJECTION, SOLUTION INTRAMUSCULAR; INTRAVENOUS AS NEEDED
Status: DISCONTINUED | OUTPATIENT
Start: 2019-08-15 | End: 2019-08-15 | Stop reason: HOSPADM

## 2019-08-15 RX ORDER — FENTANYL CITRATE 50 UG/ML
25 INJECTION, SOLUTION INTRAMUSCULAR; INTRAVENOUS
Status: DISCONTINUED | OUTPATIENT
Start: 2019-08-15 | End: 2019-08-15 | Stop reason: HOSPADM

## 2019-08-15 RX ORDER — MORPHINE SULFATE 10 MG/ML
2 INJECTION, SOLUTION INTRAMUSCULAR; INTRAVENOUS
Status: DISCONTINUED | OUTPATIENT
Start: 2019-08-15 | End: 2019-08-15 | Stop reason: HOSPADM

## 2019-08-15 RX ORDER — SODIUM CHLORIDE 0.9 % (FLUSH) 0.9 %
5-40 SYRINGE (ML) INJECTION EVERY 8 HOURS
Status: DISCONTINUED | OUTPATIENT
Start: 2019-08-15 | End: 2019-08-15 | Stop reason: HOSPADM

## 2019-08-15 RX ORDER — LIDOCAINE HYDROCHLORIDE 20 MG/ML
INJECTION, SOLUTION EPIDURAL; INFILTRATION; INTRACAUDAL; PERINEURAL AS NEEDED
Status: DISCONTINUED | OUTPATIENT
Start: 2019-08-15 | End: 2019-08-15 | Stop reason: HOSPADM

## 2019-08-15 RX ORDER — MIDAZOLAM HYDROCHLORIDE 1 MG/ML
INJECTION, SOLUTION INTRAMUSCULAR; INTRAVENOUS AS NEEDED
Status: DISCONTINUED | OUTPATIENT
Start: 2019-08-15 | End: 2019-08-15 | Stop reason: HOSPADM

## 2019-08-15 RX ORDER — LIDOCAINE HYDROCHLORIDE 10 MG/ML
0.1 INJECTION, SOLUTION EPIDURAL; INFILTRATION; INTRACAUDAL; PERINEURAL AS NEEDED
Status: DISCONTINUED | OUTPATIENT
Start: 2019-08-15 | End: 2019-08-15 | Stop reason: HOSPADM

## 2019-08-15 RX ADMIN — KETAMINE HYDROCHLORIDE 20 MG: 100 INJECTION, SOLUTION INTRAMUSCULAR; INTRAVENOUS at 09:09

## 2019-08-15 RX ADMIN — MIDAZOLAM HYDROCHLORIDE 2 MG: 1 INJECTION, SOLUTION INTRAMUSCULAR; INTRAVENOUS at 08:57

## 2019-08-15 RX ADMIN — FENTANYL CITRATE 25 MCG: 50 INJECTION, SOLUTION INTRAMUSCULAR; INTRAVENOUS at 09:14

## 2019-08-15 RX ADMIN — PROPOFOL 10 MG: 10 INJECTION, EMULSION INTRAVENOUS at 09:01

## 2019-08-15 RX ADMIN — PROPOFOL 20 MG: 10 INJECTION, EMULSION INTRAVENOUS at 09:07

## 2019-08-15 RX ADMIN — PROPOFOL 20 MG: 10 INJECTION, EMULSION INTRAVENOUS at 09:05

## 2019-08-15 RX ADMIN — FENTANYL CITRATE 25 MCG: 50 INJECTION, SOLUTION INTRAMUSCULAR; INTRAVENOUS at 09:10

## 2019-08-15 RX ADMIN — LIDOCAINE HYDROCHLORIDE 40 MG: 20 INJECTION, SOLUTION EPIDURAL; INFILTRATION; INTRACAUDAL; PERINEURAL at 09:01

## 2019-08-15 RX ADMIN — PROPOFOL 20 MG: 10 INJECTION, EMULSION INTRAVENOUS at 09:03

## 2019-08-15 RX ADMIN — SODIUM CHLORIDE, SODIUM LACTATE, POTASSIUM CHLORIDE, AND CALCIUM CHLORIDE 25 ML/HR: 600; 310; 30; 20 INJECTION, SOLUTION INTRAVENOUS at 08:40

## 2019-08-15 RX ADMIN — ONDANSETRON HYDROCHLORIDE 4 MG: 2 INJECTION, SOLUTION INTRAMUSCULAR; INTRAVENOUS at 09:21

## 2019-08-15 RX ADMIN — FENTANYL CITRATE 50 MCG: 50 INJECTION, SOLUTION INTRAMUSCULAR; INTRAVENOUS at 09:00

## 2019-08-15 NOTE — ANESTHESIA PREPROCEDURE EVALUATION
Anesthetic History   No history of anesthetic complications            Review of Systems / Medical History  Patient summary reviewed, nursing notes reviewed and pertinent labs reviewed    Pulmonary  Within defined limits                 Neuro/Psych         Headaches (migraines)     Cardiovascular  Within defined limits                Exercise tolerance: >4 METS  Comments: Post-chemo ECHO normal   GI/Hepatic/Renal  Within defined limits              Endo/Other        Cancer (breast, left s/p chemo)     Other Findings              Physical Exam    Airway  Mallampati: I  TM Distance: > 6 cm  Neck ROM: normal range of motion   Mouth opening: Normal     Cardiovascular    Rhythm: regular  Rate: normal         Dental  No notable dental hx       Pulmonary  Breath sounds clear to auscultation               Abdominal  GI exam deferred       Other Findings            Anesthetic Plan    ASA: 2  Anesthesia type: MAC          Induction: Intravenous  Anesthetic plan and risks discussed with: Patient

## 2019-08-15 NOTE — DISCHARGE INSTRUCTIONS
Discharge Instructions from Dr. Trinity Edouard    ·   · You may shower, but no hot tubs, swimming pools, or baths until your incision is healed. · No heavy lifting with the affected extremity (nothing greater than 5 pounds), and limit its use for the next 4-5 days. · You may use an ice pack for comfort for the next couple of days, but do not place ice directly on the skin. Rather, use a towel or clothing to serve as a barrier between skin and ice to prevent injury. · If I placed a drain, follow the drain instructions provided, especially as you keep a record of the drain output. · Follow medication instructions carefully. · May take tylenol or ibuprofen for discomfort. · You will have bruising and swelling  · Watch for signs of infection as listed below. · Redness  · Swelling  · Drainage from the incision or from your nipple that appears infected  · Fever over 101.5 degrees for consecutive readings, or over 99.5 if you are currently undergoing chemotherapy. · Call our office (number is below) for a follow-up appointment. · If you have any problems, our phone number is [phone]      TO PREVENT AN INFECTION      1. [address] YOUR HANDS     To prevent infection, good handwashing is the most important thing you or your caregiver can do.  Wash your hands with soap and water or use the hand  we gave you before you touch any wounds. 2. SHOWER     Use the antibacterial soap we gave you when you take a shower.  Shower with this soap until your wounds are healed.  To reach all areas of your body, you may need someone to help you.  Dont forget to clean your belly button with every shower. 3.  USE CLEAN SHEETS     Use freshly cleaned sheets on your bed after surgery.  To keep the surgery site clean, do not allow pets to sleep with you while your wound is still healing. 4. STOP SMOKING     Stop smoking, or at least cut back on smoking     Smoking slows your healing. 5. CONTROL YOUR BLOOD SUGAR     High blood sugars slow wound healing.  If you are diabetic, control your blood sugar levels before and after your surgery. DO NOT TAKE SLEEPING MEDICATIONS OR ANTIANXIETY MEDICATIONS WHILE TAKING NARCOTIC PAIN MEDICATIONS,  ESPECIALLY THE NIGHT OF ANESTHESIA. CPAP PATIENTS BE SURE TO WEAR MACHINE WHENEVER NAPPING OR SLEEPING. DISCHARGE SUMMARY from Nurse    The following personal items collected during your admission are returned to you:   Dental Appliance: Dental Appliances: None  Vision: Visual Aid: Glasses()  Hearing Aid:    Jewelry: Jewelry: None  Clothing: Clothing: With patient  Other Valuables: Other Valuables: Purse()  Valuables sent to safe:        PATIENT INSTRUCTIONS:    After General Anesthesia or Intravenous Sedation, for 24 hours or while taking prescription Narcotics:        Someone should be with you for the next 24 hours. For your own safety, a responsible adult must drive you home. · Limit your activities  · Recommended activity: Rest today, up with assistance today. Do not climb stairs or shower unattended for the next 24 hours. · Please start with a soft bland diet and advance as tolerated (no nausea) to regular diet. · If you have a sore throat you should try the following: fluids, warm salt water gargles, or throat lozenges. If it does not improve after several days please follow up with your primary physician. · Do not drive and operate hazardous machinery  · Do not make important personal or business decisions  · Do  not drink alcoholic beverages  · If you have not urinated within 8 hours after discharge, please contact your surgeon on call.     Report the following to your surgeon:  · Excessive pain, swelling, redness or odor of or around the surgical area  · Temperature over 100.5  · Nausea and vomiting lasting longer than 4 hours or if unable to take medications  · Any signs of decreased circulation or nerve impairment to extremity: change in color, persistent  numbness, tingling, coldness or increase pain      · You will receive a Post Operative Call from one of the Recovery Room Nurses on the day after your surgery to check on you. It is very important for us to know how you are recovering after your surgery. If you have an issue or need to speak with someone, please call your surgeon, do not wait for the post operative call. · You may receive an e-mail or letter in the mail from [facility] regarding your experience with us in the Ambulatory Surgery Unit. Your feedback is valuable to us and we appreciate your participation in the survey. · If the above instructions are not adequate, please contact MARY CARMEN Manuel, RN Perianesthesia Manager at [phone]. If you are having problems after your surgery, call the physician at his office number. · We wish you a speedy recovery ? What to do at Home:      *  Please give a list of your current medications to your Primary Care Provider. *  Please update this list whenever your medications are discontinued, doses are      changed, or new medications (including over-the-counter products) are added. *  Please carry medication information at all times in case of emergency situations. If you have not received your influenza and/or pneumococcal vaccine, please follow up with your primary care physician. The discharge information has been reviewed with the patient and spouse. The patient and spouse verbalized understanding.

## 2019-08-15 NOTE — BRIEF OP NOTE
BRIEF OPERATIVE NOTE    Date of Procedure: 8/15/2019   Preoperative Diagnosis: LEFT BREAST CANCER  Postoperative Diagnosis: LEFT BREAST CANCER    Procedure(s):  PORT A CATH  REMOVAL (LATEX ALLERGY)  Surgeon(s) and Role:     Merry Barrett MD - Primary         Surgical Assistant: none    Surgical Staff:  Circ-1: Toyin Whitfield RN  Scrub Tech-1: Wendy Fuel.   Event Time In Time Out   Incision Start 0914    Incision Close 0922      Anesthesia: MAC   Estimated Blood Loss: minimal  Specimens: * No specimens in log *   Findings: port removed intact    Complications: none   Implants: * No implants in log *    Dictated stat

## 2019-08-15 NOTE — PERIOP NOTES
Permission received to review discharge instructions and discuss private health information with Donovan Pickett, .

## 2019-08-15 NOTE — OP NOTES
[address]  OPERATIVE REPORT    Name:  Andi Lackey  MR#:  [id number]  :  1970  ACCOUNT #:  [de-identified]  DATE OF SERVICE:  08/15/2019    PREOPERATIVE DIAGNOSIS:  Left breast cancer. POSTOPERATIVE DIAGNOSIS:  Left breast cancer. PROCEDURE PERFORMED:  Right subclavian port removal.    SURGEON:  Sparkle Mittal MD    ASSISTANT:  None. ANESTHESIA:  MAC.    COMPLICATIONS:  None. SPECIMENS REMOVED:  None. IMPLANTS:  No implants. ESTIMATED BLOOD LOSS:  Minimal.    FINDINGS:  Port removed intact. INDICATION FOR PROCEDURE:  A 82-year-old female, had a history of left breast cancer. She had had chemotherapy and had a port in and now she wished to have this removed. PROCEDURE IN DETAIL:  The patient was seen in the preop holding area where surgical site was marked by surgeon. Informed consent was obtained. She was taken to the operating room, laid in supine position where MAC anesthesia was induced. Right chest prepped and draped in the usual fashion and time-out was performed. 20 mL of local anesthetic was injected at the prior port site. A 15-blade was used to open the prior scar. Bovie cautery was used to dissect through the port capsule. The catheter part of the port was clamped and removed. Pressure was held on the port site for 5 minutes. The Bovie was used to dissect through the port capsule. Heavy scissors was used to cut the Prolene on either side of the port. The port tract was oversewn with interrupted 3-0 Vicryl, skin with interrupted 3-0 Vicryl and 4-0 subcuticular Monocryl. Skin glue was placed on the incision. All sponge, needle, and instrument counts were correct. The patient went to recovery in stable condition.     Glendon Hashimoto, MD [id number]  D:  08/15/2019 9:27  T:  08/15/2019 9:35  JOB #:  [id number]

## 2019-08-15 NOTE — H&P
HISTORY OF PRESENT ILLNESS  Ye Lane is a 54 y.o. female. HPI ESTABLISHED patient here for post op visit s/p LEFT breast lumpectomy and LEFT SLNB on 5/16/19. Overall doing well. Just some soreness in LEFT axilla.      Breast cancer-  Stage 3 infiltrating ductal carcinoma, Tumor size 0.8 mm, LN -v3, grade 3, ER 0, MO 15%, Her 2 -ve. High risk mammaprint. + supraclav node  Enlarged supraclavicular LN on the same side which biopsy showed invasive breast cancer. Pet avid supraclav and IM nodes on left  11/8/18- port-a-cath insertion  11/2018 through 3/2019 - neoadjuvant chemotherapy - Dr. 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 5/16/19 - LEFT breast lumpectomy and LEFT SLNB. Surg path showed complete response. Nodes negative.      No family history of breast or ovarian cancer. Patient is genetic test negative Xiomara Ramirez Breast Next 10/2018)     Breast imaging-  Breast MRI 4/11/19   LEFT breast diagnostic mammogram 9/2018  Screening mammogram 8/2018     FINAL PATHOLOGIC DIAGNOSIS   1. Left axillary sentinel node #1, biopsy:   One lymph node, negative for carcinoma (levels and cytokeratin stain examined)   2. Left axillary sentinel node #2, biopsy:   Benign fatty tissue, negative for lymph node and carcinoma   3. Left axillary sentinel node #3, biopsy:   One fatty replaced lymph node, negative for carcinoma (levels and cytokeratin stain examined)   4.  Left breast, lumpectomy without wire-guided localization:   No residual carcinoma identified   Biopsy site   INVASIVE CARCINOMA OF THE BREAST   SPECIMEN   Procedure: Excision (less than total mastectomy)   Specimen Laterality: Left   TUMOR   Tumor Site: Invasive Carcinoma: Not specified   Histologic Type: No residual invasive carcinoma   Histologic Grade (W[place] Histologic Score) : No residual   invasive carcinoma   Ductal Carcinoma In Situ (DCIS): Not identified   Lobular Carcinoma In Situ (LCIS): No LCIS in specimen   Tumor Extent   Skin: Skin is not present   Accessory Findings   Lymphovascular Invasion: Not identified   Dermal Lymphovascular Invasion: No skin present   Microcalcifications: Not identified   Treatment Effect in the Breast: No residual invasive carcinoma is   present in the breast after presurgical therapy   Treatment Effect in the Lymph Nodes: No lymph node metastases and no   prominent fibrous scarring in the nodes   MARGINS   Invasive Carcinoma Margins: Uninvolved by invasive carcinoma   Distance from Closest Margin in Millimeters: Cannot be   determined: No residual carcinoma   Closest Margin: Cannot be determined: No residual carcinoma   LYMPH NODES   Regional Lymph Nodes: Uninvolved by tumor cells   Number of Lymph Nodes Examined: 2   Number of [place] Nodes Examined: 2   PATHOLOGIC STAGE CLASSIFICATION (pTNM, AJCC 8th Edition)   TNM Descriptors: y (post-treatment)   Primary Tumor (Invasive Carcinoma) (pT): pT0   Regional Lymph Nodes (pN)   Modifier: (sn): Only sentinel node(s) evaluated. Category (pN): pN0   HORMONE RECEPTOR and HER-2 status from  , left supraclavicular lymph node:   Estrogen receptor: Negative   Progesterone receptor: Negative   HER-2 IHC: Negative   HORMONE RECEPTOR and HER-2 status from  , left breast core needle biopsy:   Estrogen receptor: Negative   Progesterone receptor: Positive, weak   HER-2 IHC: Negative      Review of Systems   All other systems reviewed and are negative.        Physical Exam   Pulmonary/Chest:       Incisions healing well  No seroma   Nursing note and vitals reviewed.        ASSESSMENT and PLAN      ICD-10-CM ICD-9-CM     1.  S/P lumpectomy, left breast Z98.890 V36.80        53 yo female s/p left lumpectomy, sln biopsy  Received neoadjuvant chemo for supraclav nodes, im nodes  pcr on surg path  Port removal

## 2019-08-15 NOTE — PERIOP NOTES
[facility]  1970  [id number]    Situation:  Verbal report given from: STORMY Morales CRNA, RN  Procedure: Procedure(s):  PORT A CATH  REMOVAL (LATEX ALLERGY)    Background:    Preoperative diagnosis: LEFT BREAST CANCER    Postoperative diagnosis: LEFT BREAST CANCER    :  Dr. Eliu Grey    Assistant(s): Circ-1: Sarkis Garg RN  Scrub Tech-1: Halle Easton     Specimens: * No specimens in log *    Assessment:  Intra-procedure medications         Anesthesia gave intra-procedure sedation and medications, see anesthesia flow sheet     Intravenous fluids: LR@ KVO     Vital signs stable       Recommendation:

## 2019-08-15 NOTE — ANESTHESIA POSTPROCEDURE EVALUATION
Procedure(s):  PORT A CATH  REMOVAL (LATEX ALLERGY).     MAC    Anesthesia Post Evaluation      Multimodal analgesia: multimodal analgesia used between 6 hours prior to anesthesia start to PACU discharge  Patient location during evaluation: bedside  Patient participation: complete - patient participated  Level of consciousness: awake and alert  Pain score: 0  Airway patency: patent  Anesthetic complications: no  Cardiovascular status: acceptable  Respiratory status: acceptable  Hydration status: acceptable  Post anesthesia nausea and vomiting:  none      Vitals Value Taken Time   /79 8/15/2019  9:44 AM   Temp 36.4 °C (97.5 °F) 8/15/2019  9:44 AM   Pulse 79 8/15/2019  9:44 AM   Resp 16 8/15/2019  9:44 AM   SpO2 100 % 8/15/2019  9:44 AM

## 2019-08-15 NOTE — PERIOP NOTES
Permission received to review discharge instructions and discuss private health information with  - Ira Hicks. 0950-Pt waking up more now,  brought to bedside and updated on pt's status. VSS    1013-D/c instructions reviewed, all questions answered. Reviewed hygiene care, when to call the doctor, diet and activity. VSS    1034-Transported to awaiting transportation via w/c. No complaints at time of d/c home.

## 2019-10-25 ENCOUNTER — OFFICE VISIT (OUTPATIENT)
Dept: SURGERY | Age: 49
End: 2019-10-25

## 2019-10-25 VITALS
HEIGHT: 65 IN | BODY MASS INDEX: 25.99 KG/M2 | SYSTOLIC BLOOD PRESSURE: 123 MMHG | WEIGHT: 156 LBS | DIASTOLIC BLOOD PRESSURE: 79 MMHG | HEART RATE: 79 BPM

## 2019-10-25 DIAGNOSIS — M79.622 AXILLARY PAIN, LEFT: Primary | ICD-10-CM

## 2019-10-25 NOTE — PROGRESS NOTES
HISTORY OF PRESENT ILLNESS  Nilesh Mane is a 54 y.o. female. HPI ESTABLISHED patient here for follow up LEFT breast cancer. Describes cording in LEFT axilla which is bothersome to her.      Breast cancer-  Stage 3 infiltrating ductal carcinoma, Tumor size 0.8 mm, LN -v3, grade 3, ER 0, [place] 15%, Her 2 -ve. High risk mammaprint. + supraclav node  Enlarged supraclavicular LN on the same side which biopsy showed invasive breast cancer. Pet avid supraclav and IM nodes on left  11/8/18- port-a-cath insertion  11/2018 through 3/2019 - neoadjuvant chemotherapy - Dr. 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 5/16/19 - LEFT breast lumpectomy and LEFT SLNB. Surg path showed complete response. Nodes negative. 8/15/19 - port removal     No family history of breast or ovarian cancer. Patient is genetic test negative [facility] Breast Next 10/2018)     Breast imaging-  Breast MRI 4/11/19   LEFT breast diagnostic mammogram 9/2018  Screening mammogram 8/2018  Scheduled for mammogram in February 2020    Review of Systems   All other systems reviewed and are negative. Physical Exam   Pulmonary/Chest: Right breast exhibits no inverted nipple, no mass, no nipple discharge, no skin change and no tenderness. Left breast exhibits no inverted nipple, no mass, no nipple discharge, no skin change and no tenderness. Breasts are symmetrical.   + cording left axilla  No axillary, supraclavicular or cervical lymphadenopathy   Lymphadenopathy:     She has no cervical adenopathy. She has no axillary adenopathy. Nursing note and vitals reviewed. ASSESSMENT and PLAN    ICD-10-CM ICD-9-CM    1.  Axillary pain, left M79.622 729.5      - refer to PT for cording  - f/u in 6 months  Mammograms scheduled for February 2020

## 2019-10-25 NOTE — PATIENT INSTRUCTIONS

## 2019-10-28 DIAGNOSIS — C50.912 MALIGNANT NEOPLASM OF LEFT FEMALE BREAST, UNSPECIFIED ESTROGEN RECEPTOR STATUS, UNSPECIFIED SITE OF BREAST (HCC): Primary | ICD-10-CM

## 2019-11-11 ENCOUNTER — DOCUMENTATION ONLY (OUTPATIENT)
Dept: SURGERY | Age: 49
End: 2019-11-11

## 2019-11-11 NOTE — PROGRESS NOTES
I called the patient and let her know that [facility] had her referral for appointment,. Patient will call and make one that suits her schedule.

## 2019-12-10 ENCOUNTER — TELEPHONE (OUTPATIENT)
Dept: SURGERY | Age: 49
End: 2019-12-10

## 2019-12-10 NOTE — TELEPHONE ENCOUNTER
Received a call from [address] who is seeing the patient for S/P LEFT lumpectomy with cording and she reports new swelling in the LEFT axilla. She is concerned with the start of lymphedema and would like the patient seen. I called the patient and she is in agreement and I will have PSR call her with an appointment time. She was very appreciative.

## 2019-12-18 ENCOUNTER — OFFICE VISIT (OUTPATIENT)
Dept: SURGERY | Age: 49
End: 2019-12-18

## 2019-12-18 VITALS
HEIGHT: 65 IN | DIASTOLIC BLOOD PRESSURE: 78 MMHG | SYSTOLIC BLOOD PRESSURE: 133 MMHG | HEART RATE: 80 BPM | WEIGHT: 156 LBS | BODY MASS INDEX: 25.99 KG/M2

## 2019-12-18 DIAGNOSIS — M79.622 PAIN IN LEFT AXILLA: Primary | ICD-10-CM

## 2019-12-18 RX ORDER — ONDANSETRON 4 MG/1
TABLET, ORALLY DISINTEGRATING ORAL
COMMUNITY
Start: 2019-12-14

## 2019-12-18 NOTE — PATIENT INSTRUCTIONS

## 2019-12-18 NOTE — PROGRESS NOTES
HISTORY OF PRESENT ILLNESS  Kye Mcqueen is a 54 y.o. female. HPI ESTABLISHED patient here for \"puffiness\" and itching of LEFT axilla and LEFT breast. Has noticed for about 2 months, especially after radiation was over and she completed her first PT session.     Breast cancer-  Stage 3 infiltrating ductal carcinoma, Tumor size 0.8 mm, LN -v3, grade 3, ER 0, [place] 15%, Her 2 -ve. High risk mammaprint. + supraclav node  Enlarged supraclavicular LN on the same side which biopsy showed invasive breast cancer. Pet avid supraclav and IM nodes on left  11/8/18- port-a-cath insertion  11/2018 through 3/2019 - neoadjuvant chemotherapy - Dr. 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   5/16/19 - LEFT breast lumpectomy and LEFT SLNB. Surg path showed complete response. Nodes negative. 7/2019 - completed radiation therapy - Dr. Mccarty Person  8/15/19 - port removal     No family history of breast or ovarian cancer. Patient is genetic test negative [facility] Breast Next 10/2018)     Breast imaging-  Breast MRI 4/11/19   LEFT breast diagnostic mammogram 9/2018  Screening mammogram 8/2018  Scheduled for mammogram in February 2020    Review of Systems   All other systems reviewed and are negative. Physical Exam  Vitals signs and nursing note reviewed. Chest:          Comments: No masses in breast   Axillary tenderness on left axilla. Lymphadenopathy:      Upper Body:      Left upper body: No supraclavicular, axillary or pectoral adenopathy. BREAST ULTRASOUND  Indication: Left  Axillary and lateral breast pain  Technique: The area was scanned using a high-frequency linear-array near-field transducer  Findings: No abnormal mass, lesion, or shadowing noted. No cysts no adenopathy  Impression: Normal dense fibrocystic breast tissue   Disposition: No worrisome finding on ultrasound  ASSESSMENT and PLAN    ICD-10-CM ICD-9-CM    1.  Pain in left axilla M79.622 729.5 - no masses today. No palpable nodes or us abnormalities. Continue PT  Likely due to xrt. May try heat, nsaids.

## 2019-12-27 ENCOUNTER — OFFICE VISIT (OUTPATIENT)
Dept: ONCOLOGY | Age: 49
End: 2019-12-27

## 2019-12-27 VITALS
TEMPERATURE: 97 F | DIASTOLIC BLOOD PRESSURE: 76 MMHG | SYSTOLIC BLOOD PRESSURE: 112 MMHG | OXYGEN SATURATION: 99 % | RESPIRATION RATE: 16 BRPM | WEIGHT: 153 LBS | HEART RATE: 82 BPM | BODY MASS INDEX: 25.49 KG/M2 | HEIGHT: 65 IN

## 2019-12-27 DIAGNOSIS — Z85.3 HISTORY OF INVASIVE DUCTAL CARCINOMA OF BREAST: Primary | ICD-10-CM

## 2019-12-27 DIAGNOSIS — Z17.1 MALIGNANT NEOPLASM OF UPPER-INNER QUADRANT OF LEFT BREAST IN FEMALE, ESTROGEN RECEPTOR NEGATIVE (HCC): ICD-10-CM

## 2019-12-27 DIAGNOSIS — C50.212 MALIGNANT NEOPLASM OF UPPER-INNER QUADRANT OF LEFT BREAST IN FEMALE, ESTROGEN RECEPTOR NEGATIVE (HCC): ICD-10-CM

## 2019-12-27 NOTE — PROGRESS NOTES
Chief Complaint   Patient presents with    Breast Cancer     follow up         1. Have you been to the ER, urgent care clinic since your last visit? Hospitalized since your last visit? no    2. Have you seen or consulted any other health care providers outside of the [address] [place] [address] since your last visit? Include any pap smears or colon screening.   no

## 2020-01-04 NOTE — PROGRESS NOTES
[address], [address] [place] [address] [place], [address] [facility]  [phone]      Follow-up Note        Patient: Ana Maria Pizano MRN: [id number]  SSN: [id number]    YOB: 1970  Age: 54 y.o. Sex: female      Diagnosis:     1. Left breast carcinoma: Dx: 9/17/2018  T1b N3a M0 (Stage IIIC) infiltrating ductal carcinoma, Tumor size 0.8 mm, LN +ve, grade 3, ER 0, [place] 15%, Her 2 -ve     Treatment:     1. Neoadjuvant chemotherapy   Adriamycin + Cytoxan followed by weekly Taxol  2. S/P left sided breast lumpectomy and LN excision 05/16/2019  3. Completed adjuvant radiation therapy. Subjective:      Ana Maria Pizano is a 54 y.o. female with a diagnosis of left sided invasive breast carcinoma. She underwent a screening mammogram and was noted to have a density in the upper inner quadrant of the left breast. A biopsy of the lesion showed a diagnosis of invasive breast carcinoma ER -ve [place] 15% and Her 2 negative. An MRI did not reveal any additional abnormalities. She had an enlarged supraclavicular LN on the same side which biopsy showed invasive breast cancer. She received neoadjuvant chemotherapy. She underwent left sided lumpectomy and sentinel LN excision on 05/16/2019. She then went on to complete adjuvant radiation to the chest wall and axilla. She is doing well. She comes in for follow up.        Review of Systems:    Constitutional: fatigue  Eyes: negative  Ears, Nose, Mouth, Throat, and Face: negative  Respiratory: negative  Cardiovascular: negative  Gastrointestinal: negative  Genitourinary:negative  Integument/Breast: negative  Hematologic/Lymphatic: negative  Musculoskeletal:negative  Neurological: negative        Past Medical History:   Diagnosis Date    Breast cancer ([address]     left    Frequent headaches     Migraine     22 years     Past Surgical History:   Procedure Laterality Date    HX BREAST LUMPECTOMY Left 5/16/2019    LEFT BREAST LUMPECTOMY/LEFT SENTINEL NODE BIOPSY performed by Keith Chicas MD at [facility]    HX DILATION AND CURETTAGE      2010    IR INSERT TUNL CVAD W PORT LESS THAN 5 YR Right 11/08/2018    right per op note      Family History   Problem Relation Age of Onset    Headache Mother     Headache Maternal Aunt      Social History     Tobacco Use    Smoking status: Never Smoker    Smokeless tobacco: Never Used   Substance Use Topics    Alcohol use: Yes     Comment: social/rare      Prior to Admission medications    Medication Sig Start Date End Date Taking? Authorizing Provider   ondansetron (ZOFRAN ODT) 4 mg disintegrating tablet DIS ONE T PO Q 8 H  PRN NV 12/14/19  Yes Provider, Historical   MULTIVITAMIN PO Take 1 Tab by mouth daily. Yes Provider, Historical   SUMAtriptan succinate (ZEMBRACE SYMTOUCH) 3 mg/0.5 mL pnij 1 Syringe by SubCUTAneous route as needed. 1 at HA onset and repeat q 1 hour until WHELAN relieved or until used 4 in 24 hours 10/7/18  Yes Karen Grullon NP   SUMAtriptan (IMITREX) 100 mg tablet Take 1tab at the onset of HA Repeat in 2 hours. MAX 2 tabs  in 24hrs 3/22/18  Yes Karen Grullon NP          Allergies   Allergen Reactions    Latex Itching     Skin gets very dry         Objective:     Visit Vitals  /76 (BP 1 Location: Left arm, BP Patient Position: Sitting)   Pulse 82   Temp 97 °F (36.1 °C) (Oral)   Resp 16   Ht 5' 5\" (1.651 m)   Wt 153 lb (69.4 kg)   SpO2 99%   BMI 25.46 kg/m²       Pain Scale: 0/10  Pain Location:     Physical Exam:    GENERAL: alert, cooperative, no distress, appears stated age  EYE: negative  LYMPHATIC: no adenopathy  THROAT & NECK: normal and no erythema or exudates noted.    LUNG: clear to auscultation bilaterally  HEART: regular rate and rhythm  ABDOMEN: soft, non-tender  EXTREMITIES:  no edema  SKIN: no rash  NEUROLOGIC: negative      Lab Results   Component Value Date/Time    WBC 2.7 (L) 04/05/2019 11:41 AM    HGB 12.4 04/05/2019 11:41 AM    HCT 36.9 04/05/2019 11:41 AM    PLATELET 298 11/62/8191 11:41 AM    MCV 91.6 04/05/2019 11:41 AM       Lab Results   Component Value Date/Time    Sodium 140 03/22/2019 11:33 AM    Potassium 4.0 03/22/2019 11:33 AM    Chloride 106 03/22/2019 11:33 AM    CO2 29 03/22/2019 11:33 AM    Anion gap 5 03/22/2019 11:33 AM    Glucose 105 (H) 03/22/2019 11:33 AM    BUN 17 03/22/2019 11:33 AM    Creatinine 0.73 03/22/2019 11:33 AM    BUN/Creatinine ratio 23 (H) 03/22/2019 11:33 AM    GFR est AA >60 03/22/2019 11:33 AM    GFR est non-AA >60 03/22/2019 11:33 AM    Calcium 9.0 03/22/2019 11:33 AM    Bilirubin, total 0.3 03/22/2019 11:33 AM    AST (SGOT) 42 (H) 03/22/2019 11:33 AM    Alk. phosphatase 116 03/22/2019 11:33 AM    Protein, total 7.0 03/22/2019 11:33 AM    Albumin 3.4 (L) 03/22/2019 11:33 AM    Globulin 3.6 03/22/2019 11:33 AM    A-G Ratio 0.9 (L) 03/22/2019 11:33 AM    ALT (SGPT) 77 03/22/2019 11:33 AM         Assessment:     1. Left breast carcinoma:  T1b N3a M0 (Stage IIIC) infiltrating ductal carcinoma, Tumor size 0.8 mm, LN +ve, grade 3, ER 0, [place] 15%, Her 2 -ve      ECOG PS 0  Intent of Treatment - curative  Prognosis- excellent    LVEF - 54% - 11/9/2018. Repeat 1/4/2019 55%    PET - 11/7/2018 - mildly hypermetabolic left supraclavicular and left MICHAEL lymph nodes. Received neoadjuvant chemotherapy   Adriamycin/Cyclophosphamide followed by weekly Taxol    S/P left sided breast lumpectomy and LN excision 05/16/2019  Achieved pCR. Completed adjuvant radiation to the left breast  In remission      Plan:       · Return in 3 months       Signed by: Luci Givens MD                     January 3, 2020        CC. Tawnya Hylton MD  CC. Rajendra Vega MD  CC.  Richard Sheridan MD

## 2020-01-27 ENCOUNTER — TELEPHONE (OUTPATIENT)
Dept: ONCOLOGY | Age: 50
End: 2020-01-27

## 2020-01-27 NOTE — TELEPHONE ENCOUNTER
Pt called with complaints of cough x 10 days, productive cough. Chest x-ray was done and patient first advised her to see  due to her history. I asked for her to have them send over records and we can determine if appt. Is needed or if she needs to go to PCP. We can not schedule until records are recevied. We can not see on same day she brings in records, we need to prepare for a visit.

## 2020-02-12 ENCOUNTER — HOSPITAL ENCOUNTER (OUTPATIENT)
Dept: MAMMOGRAPHY | Age: 50
Discharge: HOME | End: 2020-02-12
Attending: RADIOLOGY
Payer: COMMERCIAL

## 2020-02-12 DIAGNOSIS — Z85.3 PERSONAL HISTORY OF MALIGNANT NEOPLASM OF BREAST: ICD-10-CM

## 2020-02-12 PROCEDURE — 77066 DX MAMMO INCL CAD BI: CPT

## 2021-03-15 ENCOUNTER — TRANSCRIBE ORDER (OUTPATIENT)
Dept: SCHEDULING | Age: 51
End: 2021-03-15

## 2021-03-15 ENCOUNTER — TELEPHONE (OUTPATIENT)
Dept: SURGERY | Age: 51
End: 2021-03-15

## 2021-03-15 DIAGNOSIS — Z85.3 HISTORY OF BREAST CANCER: Primary | ICD-10-CM

## 2021-03-15 NOTE — TELEPHONE ENCOUNTER
Patient called and left a message. She needs a mammogram order. Order placed and patient notified to call schedulers to get mammogram scheduled. She was appreciative.

## 2021-04-02 ENCOUNTER — HOSPITAL ENCOUNTER (OUTPATIENT)
Dept: MAMMOGRAPHY | Age: 51
Discharge: HOME | End: 2021-04-02
Attending: SURGERY
Payer: COMMERCIAL

## 2021-04-02 DIAGNOSIS — Z85.3 HISTORY OF BREAST CANCER: ICD-10-CM

## 2021-04-02 PROCEDURE — 77066 DX MAMMO INCL CAD BI: CPT

## 2021-07-29 ENCOUNTER — TELEPHONE (OUTPATIENT)
Dept: ONCOLOGY | Age: 51
End: 2021-07-29

## 2021-07-29 NOTE — TELEPHONE ENCOUNTER
Pt left a muffled VM on nurse line asking if she can take medications wihtout it interfering with her hx of breast cancer. Upon reviewing chart, she hasn't been seen by us since 12/27/2019. Please have pt schedule appt. Can be virtual to discuss.

## 2021-08-04 ENCOUNTER — VIRTUAL VISIT (OUTPATIENT)
Dept: ONCOLOGY | Age: 51
End: 2021-08-04
Payer: COMMERCIAL

## 2021-08-04 DIAGNOSIS — Z85.3 HISTORY OF INVASIVE DUCTAL CARCINOMA OF BREAST: Primary | ICD-10-CM

## 2021-08-04 PROCEDURE — 99213 OFFICE O/P EST LOW 20 MIN: CPT | Performed by: INTERNAL MEDICINE

## 2021-08-04 RX ORDER — VENLAFAXINE HYDROCHLORIDE 37.5 MG/1
37.5 CAPSULE, EXTENDED RELEASE ORAL DAILY
COMMUNITY

## 2021-08-04 NOTE — PROGRESS NOTES
55 y/o AAF meeting on doxy. me for a virtual visit. Left sided breast cancer hx triple negative. She completed chemo and radiation in 5/2019. Last mammo with Jah Angulo 4/2021. She is having hot flashes so has been on black kohosh and effexor by [employer]. NO HOSPITALIZATIONS since we last saw her. No COVID vaccine received. She has a question about some medication she wants to take.

## 2021-08-15 NOTE — PROGRESS NOTES
2001 [facility] [address], [address] [facility], [address]  [phone]      Follow-up Note        Patient: Blanca Marquis MRN: [id number]  SSN: [id number]    YOB: 1970  Age: 54 y.o. Sex: female        Reason for Visit:   Blanca Marquis is a 54 y.o. female who is seen by synchronous (real-time) audio-video technology for follow up of left sided breast carcinoma      Diagnosis:     1. Left breast carcinoma: Dx: 9/17/2018  T1b N3a M0 (Stage IIIC) infiltrating ductal carcinoma, Tumor size 0.8 mm, LN +ve, grade 3, ER 0, [place] 15%, Her 2 -ve     Treatment:     1. Neoadjuvant chemotherapy   Adriamycin + Cytoxan followed by weekly Taxol  2. S/P left sided breast lumpectomy and LN excision 05/16/2019  3. Completed adjuvant radiation therapy. Subjective:      Blanca Marquis is a 54 y.o. female with a diagnosis of left sided invasive breast carcinoma. She underwent a screening mammogram and was noted to have a density in the upper inner quadrant of the left breast. A biopsy of the lesion showed a diagnosis of invasive breast carcinoma ER -ve [place] 15% and Her 2 negative. An MRI did not reveal any additional abnormalities. She had an enlarged supraclavicular LN on the same side which biopsy showed invasive breast cancer. She received neoadjuvant chemotherapy. She underwent left sided lumpectomy and sentinel LN excision on 05/16/2019. She then went on to complete adjuvant radiation to the chest wall and axilla. She is doing well. She is seen in follow up visit. She is considering vaginal prasterone inserts vs tetosterone due to vaginal dryness.       Review of Systems:    Constitutional: fatigue  Eyes: negative  Ears, Nose, Mouth, Throat, and Face: negative  Respiratory: negative  Cardiovascular: negative  Gastrointestinal: negative  Genitourinary:negative  Integument/Breast: negative  Hematologic/Lymphatic: negative  Musculoskeletal:negative  Neurological: negative        Past Medical History:   Diagnosis Date    Breast cancer ([address]     left    Frequent headaches     Migraine     22 years     Past Surgical History:   Procedure Laterality Date    HX BREAST LUMPECTOMY Left 5/16/2019    LEFT BREAST LUMPECTOMY/LEFT SENTINEL NODE BIOPSY performed by Michael Crowe MD at [facility]    HX DILATION AND CURETTAGE      2010    IR INSERT TUNL CVAD W PORT LESS THAN 5 YR Right 11/08/2018    right per op note      Family History   Problem Relation Age of Onset    Headache Mother     Headache Maternal Aunt      Social History     Tobacco Use    Smoking status: Never Smoker    Smokeless tobacco: Never Used   Substance Use Topics    Alcohol use: Yes     Comment: social/rare      Prior to Admission medications    Medication Sig Start Date End Date Taking? Authorizing Provider   BLACK COHOSH PO Take 1 Tablet by mouth daily. Yes Provider, Historical   venlafaxine-SR (Effexor XR) 37.5 mg capsule Take 37.5 mg by mouth daily. Yes Provider, Historical   ondansetron (ZOFRAN ODT) 4 mg disintegrating tablet DIS ONE T PO Q 8 H  PRN NV 12/14/19  Yes Provider, Historical   MULTIVITAMIN PO Take 1 Tab by mouth daily. Yes Provider, Historical   SUMAtriptan succinate (ZEMBRACE SYMTOUCH) 3 mg/0.5 mL pnij 1 Syringe by SubCUTAneous route as needed. 1 at HA onset and repeat q 1 hour until WHELAN relieved or until used 4 in 24 hours 10/7/18  Yes Annamary Fear, NP   SUMAtriptan (IMITREX) 100 mg tablet Take 1tab at the onset of HA Repeat in 2 hours.  MAX 2 tabs  in 24hrs 3/22/18  Yes Annamary Fear, NP          Allergies   Allergen Reactions    Latex Itching     Skin gets very dry         Objective:     General: alert, cooperative, no distress   Mental  status: normal mood, behavior, speech, dress, motor activity, and thought processes, able to follow commands   HENT: NCAT   Neck: no visualized mass   Resp: no respiratory distress Neuro: no gross deficits   Skin: no discoloration or lesions of concern on visible areas   Psychiatric: normal affect, consistent with stated mood, no evidence of hallucinations       Due to this being a TeleHealth evaluation (During EGECC-76 public health emergency), many elements of the physical examination are unable to be assessed. Evaluation of the following organ systems was limited: Vitals/Constitutional/EENT/Resp/CV/GI//MS/Neuro/Skin/Heme-Lymph-Imm. Assessment:     1. Left breast carcinoma:  T1b N3a M0 (Stage IIIC) infiltrating ductal carcinoma, Tumor size 0.8 mm, LN +ve, grade 3, ER 0, [place] 15%, Her 2 -ve      ECOG PS 0  Intent of Treatment - curative  Prognosis- excellent    LVEF - 54% - 11/9/2018. Repeat 1/4/2019 55%    PET - 11/7/2018 - mildly hypermetabolic left supraclavicular and left MICHAEL lymph nodes. Received neoadjuvant chemotherapy   Adriamycin/Cyclophosphamide followed by weekly Taxol    S/P left sided breast lumpectomy and LN excision 05/16/2019  Achieved pCR. Completed adjuvant radiation to the left breast  In remission  Since it has been 3 yrs since Dx, I consider her cured. Since the breast cancer was triple negative, she could have intravaginal prasterone. Plan:       · Return as needed      Signed by: Jerry Mcdonald MD                     August 15, 2021        CC. Shahrzad Parsons MD  CC. Tressa Landaverde MD  CC. Shelbie Woo MD      The patient was evaluated through a synchronous (real-time) audio-video encounter. The patient (or guardian if applicable) is aware that this is a billable service. Verbal consent to proceed has been obtained within the past 12 months. The visit was conducted pursuant to the emergency declaration under the [facility], [address] [place] [address] authority and the [employer] and [place] General Act. Patient identification was verified, and a caregiver was present when appropriate. The patient was located in a state where the provider was credentialed to provide care.

## 2022-03-19 PROBLEM — Z17.1 MALIGNANT NEOPLASM OF UPPER-INNER QUADRANT OF LEFT BREAST IN FEMALE, ESTROGEN RECEPTOR NEGATIVE (HCC): Status: ACTIVE | Noted: 2018-09-26

## 2022-03-19 PROBLEM — C50.212 MALIGNANT NEOPLASM OF UPPER-INNER QUADRANT OF LEFT BREAST IN FEMALE, ESTROGEN RECEPTOR NEGATIVE (HCC): Status: ACTIVE | Noted: 2018-09-26

## 2022-04-21 ENCOUNTER — TRANSCRIBE ORDER (OUTPATIENT)
Dept: SCHEDULING | Age: 52
End: 2022-04-21

## 2022-04-21 DIAGNOSIS — Z12.31 SCREENING MAMMOGRAM, ENCOUNTER FOR: Primary | ICD-10-CM

## 2022-05-02 DIAGNOSIS — Z85.3 HISTORY OF LEFT BREAST CANCER: Primary | ICD-10-CM

## 2022-05-03 ENCOUNTER — TRANSCRIBE ORDER (OUTPATIENT)
Dept: SCHEDULING | Age: 52
End: 2022-05-03

## 2022-06-06 ENCOUNTER — HOSPITAL ENCOUNTER (OUTPATIENT)
Dept: MAMMOGRAPHY | Age: 52
Discharge: HOME | End: 2022-06-06
Attending: SURGERY
Payer: COMMERCIAL

## 2022-06-06 DIAGNOSIS — Z85.3 HISTORY OF LEFT BREAST CANCER: ICD-10-CM

## 2022-06-06 PROCEDURE — 77066 DX MAMMO INCL CAD BI: CPT

## 2023-05-19 RX ORDER — ONDANSETRON 4 MG/1
TABLET, ORALLY DISINTEGRATING ORAL
COMMUNITY
Start: 2019-12-14

## 2023-05-19 RX ORDER — VENLAFAXINE HYDROCHLORIDE 37.5 MG/1
37.5 CAPSULE, EXTENDED RELEASE ORAL DAILY
COMMUNITY

## 2023-05-19 RX ORDER — SUMATRIPTAN 100 MG/1
TABLET, FILM COATED ORAL
COMMUNITY
Start: 2018-03-22

## 2023-08-08 ENCOUNTER — TRANSCRIBE ORDERS (OUTPATIENT)
Dept: SCHEDULING | Age: 53
End: 2023-08-08

## 2023-08-08 DIAGNOSIS — Z85.3 PERSONAL HISTORY OF BREAST CANCER: Primary | ICD-10-CM

## 2023-10-26 ENCOUNTER — APPOINTMENT (OUTPATIENT)
Facility: HOSPITAL | Age: 53
End: 2023-10-26
Payer: COMMERCIAL

## 2023-10-26 ENCOUNTER — HOSPITAL ENCOUNTER (OUTPATIENT)
Facility: HOSPITAL | Age: 53
Discharge: HOME | End: 2023-10-26
Payer: COMMERCIAL

## 2023-10-26 DIAGNOSIS — Z85.3 PERSONAL HISTORY OF BREAST CANCER: ICD-10-CM

## 2023-10-26 PROCEDURE — G0279 TOMOSYNTHESIS, MAMMO: HCPCS

## 2025-01-03 NOTE — PROGRESS NOTES
[address], [address] [place] [address] [place], [facility]  [phone]      Follow-up Note        Patient: Marcelle Jim MRN: [id number]  SSN: [id number]    YOB: 1970  Age: 57 y.o. Sex: female      Diagnosis:     1. Left breast carcinoma:  T1b N3a M0 (Stage IIIC) infiltrating ductal carcinoma, Tumor size 0.8 mm, LN +ve, grade 3, ER 0, TX 15%, Her 2 -ve     Treatment:     1. Neoadjuvant chemotherapy   Adriamycin + Cytoxan - Cycle 4 Day 1    Subjective:      Marcelle Jim is a 57 y.o. female with a new diagnosis of left sided invasive breast carcinoma. She underwent a screening mammogram and was noted to have a density in the upper inner quadrant of the left breast. A biopsy of the lesion showed a diagnosis of invasive breast carcinoma ER -ve TX 15% and Her 2 negative. An MRI did not reveal any additional abnormalities. She has an enlarged supraclavicular LN on the same side which biopsy showed invasive breast cancer. She is receiving neoadjuvant chemotherapy and is tolerating it fairly well. She continues to have fatigue and nausea.       Review of Systems:    Constitutional: fatigue  Eyes: negative  Ears, Nose, Mouth, Throat, and Face: negative  Respiratory: negative  Cardiovascular: negative  Gastrointestinal: nausea  Genitourinary:negative  Integument/Breast: negative  Hematologic/Lymphatic: palpable left neck mass  Musculoskeletal:negative  Neurological: negative        Past Medical History:   Diagnosis Date    Frequent headaches     Migraine     22 years     Past Surgical History:   Procedure Laterality Date    HX DILATION AND CURETTAGE      2010      Family History   Problem Relation Age of Onset    Headache Mother     Headache Maternal Aunt      Social History     Tobacco Use    Smoking status: Never Smoker    Smokeless tobacco: Never Used   Substance Use Topics    Alcohol use: No     Comment: Re- 2/19 apt with dr valenzuela     spoke to pt and . per pt  must rsch apt.  states he will call back to do so. please provide next open apt with dr valenzuela    social      Prior to Admission medications    Medication Sig Start Date End Date Taking? Authorizing Provider   amoxicillin-clavulanate (AUGMENTIN) 500-125 mg per tablet TK 1 T PO BID 12/7/18   Provider, Historical   VIRTUSSIN AC  mg/5 mL solution TK 5ML TO 10ML PO Q 4 H PRF COUGH 12/7/18   Provider, Historical   oxyCODONE-acetaminophen (PERCOCET) 5-325 mg per tablet Take 1 Tab by mouth every four (4) hours as needed for Pain. Max Daily Amount: 6 Tabs. 11/8/18   Sarah White MD   ondansetron (ZOFRAN ODT) 4 mg disintegrating tablet Take 1 Tab by mouth every eight (8) hours as needed for Nausea. 11/2/18   Franklin Glez NP   prochlorperazine (COMPAZINE) 10 mg tablet Take 1 Tab by mouth every six (6) hours as needed for up to 30 doses. 11/2/18   Franklin Glez NP   lidocaine-prilocaine (EMLA) topical cream Apply  to affected area as needed for Pain. 11/2/18   Franklin Glez NP   MULTIVITAMIN PO Take  by mouth. Provider, Historical   ergocalciferol, vitamin D2, (VITAMIN D2 PO) Take  by mouth. Provider, Historical   SUMAtriptan succinate (ZEMBRACE SYMTOUCH) 3 mg/0.5 mL pnij 1 Syringe by SubCUTAneous route as needed. 1 at HA onset and repeat q 1 hour until WHELAN relieved or until used 4 in 24 hours 10/7/18   Mariya Tobar NP   cyclobenzaprine (FLEXERIL) 10 mg tablet TK 1 T PO TID PRN 3/13/18   Provider, Historical   SUMAtriptan (IMITREX) 100 mg tablet Take 1tab at the onset of HA Repeat in 2 hours. MAX 2 tabs  in 24hrs 3/22/18   Mariya Tobar NP          Allergies   Allergen Reactions    Latex Itching     Skin gets very dry         Objective:      Wt Readings from Last 3 Encounters:   12/12/18 153 lb 4.8 oz (69.5 kg)   12/12/18 153 lb 4.8 oz (69.5 kg)   11/28/18 156 lb 4.8 oz (70.9 kg)     Temp Readings from Last 3 Encounters:   12/13/18 98 °F (36.7 °C)   12/12/18 97.5 °F (36.4 °C)   12/12/18 97.8 °F (36.6 °C) (Oral)     BP Readings from Last 3 Encounters:   12/13/18 115/68   12/12/18 105/65   12/12/18 105/65     Pulse Readings from Last 3 Encounters:   12/13/18 95   12/12/18 77   12/12/18 81           Physical Exam:    GENERAL: alert, cooperative, no distress, appears stated age  EYE: negative  LYMPHATIC: palpable left supraclavicular/lower cervical node feels smaller in size  THROAT & NECK: normal and no erythema or exudates noted. LUNG: clear to auscultation bilaterally  HEART: regular rate and rhythm  ABDOMEN: soft, non-tender  EXTREMITIES:  no edema  SKIN: Normal.  NEUROLOGIC: negative      Lab Results   Component Value Date/Time    WBC 7.6 12/12/2018 11:35 AM    HGB 10.3 (L) 12/12/2018 11:35 AM    HCT 30.6 (L) 12/12/2018 11:35 AM    PLATELET 025 48/46/2908 11:35 AM    MCV 88.4 12/12/2018 11:35 AM       Lab Results   Component Value Date/Time    Sodium 138 12/12/2018 11:35 AM    Potassium 3.7 12/12/2018 11:35 AM    Chloride 108 12/12/2018 11:35 AM    CO2 25 12/12/2018 11:35 AM    Anion gap 5 12/12/2018 11:35 AM    Glucose 91 12/12/2018 11:35 AM    BUN 10 12/12/2018 11:35 AM    Creatinine 0.49 (L) 12/12/2018 11:35 AM    BUN/Creatinine ratio 20 12/12/2018 11:35 AM    GFR est AA >60 12/12/2018 11:35 AM    GFR est non-AA >60 12/12/2018 11:35 AM    Calcium 8.2 (L) 12/12/2018 11:35 AM    Bilirubin, total 0.1 (L) 12/12/2018 11:35 AM    AST (SGOT) 19 12/12/2018 11:35 AM    Alk. phosphatase 131 (H) 12/12/2018 11:35 AM    Protein, total 6.6 12/12/2018 11:35 AM    Albumin 3.2 (L) 12/12/2018 11:35 AM    Globulin 3.4 12/12/2018 11:35 AM    A-G Ratio 0.9 (L) 12/12/2018 11:35 AM    ALT (SGPT) 49 12/12/2018 11:35 AM         Assessment:     1. Left breast carcinoma:  T1b N3a M0 (Stage IIIC) infiltrating ductal carcinoma, Tumor size 0.8 mm, LN +ve, grade 3, ER 0, WA 15%, Her 2 -ve      ECOG PS 0  Intent of Treatment - curative  Prognosis- excellent    LVEF - 54% - 11/9/2018    PET - 11/7/2018 - mildly hypermetabolic left supraclavicular and left MICHAEL lymph nodes.     Receiving neoadjuvant chemotherapy   Adriamycin/Cyclophosphamide, Cycle 4 Day 1    Tolerating treatment  A detailed system by system evaluation of side effect was performed to assess chemotherapy related toxicity. Some nausea - manageable   Fatigue - manageable  Blood counts are acceptable. Results reviewed with the patient. Symptom management form reviewed with patient. Responding to the treatment       2. Anemia related to chemotherapy     Observation      3. Chemotherapy induced nausea    Taking nausea meds  Reports nausea for 4-5 days after chemotherapy      Plan:       · Continue ddAC  · Neulasta on Day 2  · Start Taxol in 2 weeks  · Dexamethasone 20 mg night before taxol  · Repeat 2D echo  · Follow-up in 2 weeks      Signed by: Catarino Francis MD                     December 27, 2018        CC. Andry Naik MD  CC.  Gaby Terrell MD

## 2025-01-04 ENCOUNTER — HOSPITAL ENCOUNTER (OUTPATIENT)
Facility: HOSPITAL | Age: 55
End: 2025-01-04
Payer: COMMERCIAL

## 2025-01-04 DIAGNOSIS — Z12.31 ENCOUNTER FOR SCREENING MAMMOGRAM FOR HIGH-RISK PATIENT: ICD-10-CM

## 2025-01-04 PROCEDURE — 77063 BREAST TOMOSYNTHESIS BI: CPT

## (undated) DEVICE — KENDALL DL ECG CABLE AND LEAD WIRE SYSTEM, 3-LEAD, SINGLE PATIENT USE: Brand: KENDALL

## (undated) DEVICE — NEEDLE HYPO 25GA L1.5IN BVL ORIENTED ECLIPSE

## (undated) DEVICE — CHEST/BREAST-LF: Brand: MEDLINE INDUSTRIES, INC.

## (undated) DEVICE — SOL IRRIGATION INJ NACL 0.9% 500ML BTL

## (undated) DEVICE — APPLICATOR BNDG 1MM ADH PREMIERPRO EXOFIN

## (undated) DEVICE — BRA SURG POST-OP LG 42IN --

## (undated) DEVICE — REM POLYHESIVE ADULT PATIENT RETURN ELECTRODE: Brand: VALLEYLAB

## (undated) DEVICE — SUTURE VCRL SZ 3-0 L27IN ABSRB UD L26MM SH 1/2 CIR J416H

## (undated) DEVICE — DRAPE,REIN 53X77,STERILE: Brand: MEDLINE

## (undated) DEVICE — INSULATED BLADE ELECTRODE: Brand: EDGE

## (undated) DEVICE — (D)PREP SKN CHLRAPRP APPL 26ML -- CONVERT TO ITEM 371833

## (undated) DEVICE — 1010 S-DRAPE TOWEL DRAPE 10/BX: Brand: STERI-DRAPE™

## (undated) DEVICE — SYR 10ML LUER LOK 1/5ML GRAD --

## (undated) DEVICE — INFECTION CONTROL KIT SYS

## (undated) DEVICE — SMOKE EVACUATION PENCIL: Brand: VALLEYLAB

## (undated) DEVICE — CONTINU-FLO SOLUTION SET, 2 INJECTION SITES, MALE LUER LOCK ADAPTER WITH RETRACTABLE COLLAR, LARGE BORE STOPCOCK WITH ROTATING MALE LUER LOCK EXTENSION SET, 2 INJECTION SITES, MALE LUER LOCK ADAPTER WITH RETRACTABLE COLLAR: Brand: INTERLINK/CONTINU-FLO

## (undated) DEVICE — CONTAINER,SPEC,PNEUM TUBE,3OZ,STRL PATH: Brand: MEDLINE

## (undated) DEVICE — STERILE POLYISOPRENE POWDER-FREE SURGICAL GLOVES WITH EMOLLIENT COATING: Brand: PROTEXIS

## (undated) DEVICE — DRAPE PRB US TRNSDCR 6X96IN --

## (undated) DEVICE — LIGHT HANDLE: Brand: DEVON

## (undated) DEVICE — COVER LT HNDL PLAS RIG 1 PER PK

## (undated) DEVICE — PROBE DETECTION F/LUMPECTOMY -- ORDER IN MULTIPLES OF 5 EA ..MARGINPROBE

## (undated) DEVICE — ROCKER SWITCH PENCIL BLADE ELECTRODE, HOLSTER: Brand: EDGE

## (undated) DEVICE — GOWN,SIRUS,NONRNF,SETINSLV,XL,20/CS: Brand: MEDLINE

## (undated) DEVICE — SUT SLK 4-0 18IN FS2 BLK --

## (undated) DEVICE — TUBING, SUCTION, 1/4" X 10', STRAIGHT: Brand: MEDLINE

## (undated) DEVICE — SOLUTION IV 500ML 0.9% SOD CHL FLX CONT

## (undated) DEVICE — INTENDED FOR TISSUE SEPARATION, AND OTHER PROCEDURES THAT REQUIRE A SHARP SURGICAL BLADE TO PUNCTURE OR CUT.: Brand: BARD-PARKER ® CARBON RIB-BACK BLADES

## (undated) DEVICE — COVER US PRB W4XL15IN GAM CRDLSS FLD

## (undated) DEVICE — SUTURE MCRYL SZ 4-0 L27IN ABSRB UD L19MM PS-2 1/2 CIR PRIM Y426H

## (undated) DEVICE — TOWEL SURG W17XL27IN STD BLU COT NONFENESTRATED PREWASHED

## (undated) DEVICE — SUTURE VCRL SZ 2-0 L27IN ABSRB UD L26MM SH 1/2 CIR J417H

## (undated) DEVICE — SOLUTION IRRIGATION NACL 0.9% 1000 ML FLX CONTAINER

## (undated) DEVICE — SOLUTION IV 1000ML 0.9% SOD CHL

## (undated) DEVICE — SOLUTION LACTATED RINGERS INJECTION USP

## (undated) DEVICE — YANKAUER,BULB TIP,W/O VENT,RIGID,STERILE: Brand: MEDLINE

## (undated) DEVICE — DRAPE XR C ARM 41X74IN LF --

## (undated) DEVICE — 3M™ IOBAN™ 2 ANTIMICROBIAL INCISE DRAPE 6640EZ: Brand: IOBAN™ 2

## (undated) DEVICE — 3M™ TEGADERM™ TRANSPARENT FILM DRESSING FRAME STYLE, 1624W, 2-3/8 IN X 2-3/4 IN (6 CM X 7 CM), 100/CT 4CT/CASE: Brand: 3M™ TEGADERM™

## (undated) DEVICE — CURVED, SMALL JAW, OPEN SEALER/DIVIDER: Brand: LIGASURE

## (undated) DEVICE — SUTURE PROL SZ 2-0 L36IN NONABSORBABLE BLU SH L26MM 1/2 CIR 8523H

## (undated) DEVICE — SPONGE GZ W4XL4IN COT 12 PLY TYP VII WVN C FLD DSGN

## (undated) DEVICE — ICE PK EYE 4 1/2INX10IN (15/BX 2BX/CS